# Patient Record
Sex: FEMALE | Race: BLACK OR AFRICAN AMERICAN | Employment: OTHER | URBAN - METROPOLITAN AREA
[De-identification: names, ages, dates, MRNs, and addresses within clinical notes are randomized per-mention and may not be internally consistent; named-entity substitution may affect disease eponyms.]

---

## 2017-01-12 DIAGNOSIS — I10 HYPERTENSION, ESSENTIAL: Primary | ICD-10-CM

## 2017-01-12 NOTE — TELEPHONE ENCOUNTER
Patient came into office concerned about BP readings. BP taken in office 153/78. Dr. Dimple Andres ordering Cardura 4mg  At . Patient called and Ibis Maier will be increased to 4mg and she will take BP twice daily and call Monday and report Bp for evaluation. Cardura 4mg will be called in on Monday if no further changes to meds will be made.

## 2017-01-16 RX ORDER — DOXAZOSIN 4 MG/1
4 TABLET ORAL
Qty: 30 TAB | Refills: 3 | Status: SHIPPED | OUTPATIENT
Start: 2017-01-16 | End: 2017-03-02 | Stop reason: SDUPTHER

## 2017-01-16 NOTE — TELEPHONE ENCOUNTER
Patient called to discuss medications from Dr. Amado Jeronimo. She will continue taking cardura 4mg and keep BP log and call in one week for evaluation. She voices understanding and acceptance of this advice and will call back if any further questions or concerns.

## 2017-01-16 NOTE — TELEPHONE ENCOUNTER
Patient in office today after change in medications. 1/13  145/77  66            131/69  65  1/14  137/75  65  1/15  134/75  68  1/16  129/68  70   In office today  159/72  68.   cardura was increased to 4mg at night.  Please advise

## 2017-01-31 ENCOUNTER — TELEPHONE (OUTPATIENT)
Dept: CARDIOLOGY CLINIC | Age: 77
End: 2017-01-31

## 2017-01-31 NOTE — TELEPHONE ENCOUNTER
Patient calling to report BP readings after cardura change to 4mg every evening. 1/19  121/68  69     124/70  64  1/20  134/76  69     130/74  70  1/21  110/63  Asymptomatic  1/22  123/68  67  1/23  122/69  67     131/73  67  1/24  123/70  64    127/69  67  1/25  132/73  64  1/26  121/66  63  1/27  122/74  72    Ate salty dinner  1/29  169/93  80  1/30  153/76  72  1/31  164/82  76  Mild swelling in left foot  Please advise.

## 2017-02-01 NOTE — TELEPHONE ENCOUNTER
Patient called to discuss Dr. Alice iRvera recommendations. She will watch salt intake and uses spices to add flavor, she will read ingredient labels of purchased food to know salt content. She will continue to check BP and HR daily and report any abnormal findings. She voices understanding and acceptance of this advice and will call back if any further questions or concerns.

## 2017-03-02 DIAGNOSIS — I10 HYPERTENSION, ESSENTIAL: ICD-10-CM

## 2017-03-02 DIAGNOSIS — I10 ESSENTIAL HYPERTENSION: ICD-10-CM

## 2017-03-02 RX ORDER — METOPROLOL SUCCINATE 200 MG/1
200 TABLET, EXTENDED RELEASE ORAL DAILY
Qty: 90 TAB | Refills: 2 | Status: SHIPPED | OUTPATIENT
Start: 2017-03-02

## 2017-03-02 RX ORDER — DOXAZOSIN 4 MG/1
4 TABLET ORAL
Qty: 90 TAB | Refills: 2 | Status: SHIPPED | OUTPATIENT
Start: 2017-03-02

## 2017-03-02 NOTE — TELEPHONE ENCOUNTER
Patient calling to ask for refills to be sent to her mail order pharmacy. She voices understanding and acceptance of this advice and will call back if any further questions or concerns.

## 2017-05-02 ENCOUNTER — OFFICE VISIT (OUTPATIENT)
Dept: ORTHOPEDIC SURGERY | Age: 77
End: 2017-05-02

## 2017-05-02 VITALS
TEMPERATURE: 96.5 F | HEIGHT: 66 IN | BODY MASS INDEX: 37.41 KG/M2 | HEART RATE: 61 BPM | SYSTOLIC BLOOD PRESSURE: 149 MMHG | WEIGHT: 232.8 LBS | DIASTOLIC BLOOD PRESSURE: 69 MMHG

## 2017-05-02 DIAGNOSIS — M25.571 CHRONIC PAIN OF BOTH ANKLES: ICD-10-CM

## 2017-05-02 DIAGNOSIS — G89.29 CHRONIC PAIN OF BOTH ANKLES: ICD-10-CM

## 2017-05-02 DIAGNOSIS — E11.10 TYPE 2 DIABETES MELLITUS WITH KETOACIDOSIS WITHOUT COMA, UNSPECIFIED LONG TERM INSULIN USE STATUS: ICD-10-CM

## 2017-05-02 DIAGNOSIS — R60.0 BILATERAL LOWER EXTREMITY EDEMA: Primary | ICD-10-CM

## 2017-05-02 DIAGNOSIS — M25.572 CHRONIC PAIN OF BOTH ANKLES: ICD-10-CM

## 2017-05-02 NOTE — PROGRESS NOTES
AMBULATORY PROGRESS NOTE      Patient: Ashley Pitts             MRN: [id number]     SSN: [id number] Body mass index is 37.57 kg/(m^2). YOB: 1940     AGE: 64 y.o. EX: female    PCP: Noemi Pinto MD    IMPRESSION/DIAGNOSIS AND TREATMENT PLAN     DIAGNOSES  1. Bilateral lower extremity edema    2. Chronic pain of both ankles        Orders Placed This Encounter    AMB SUPPLY ORDER    [35316] Ankle Min 3V    [87772] Ankle Min 3V    DEXA BONE DENSITY STUDY AXIAL      Ashley Pitts understands her diagnoses and the proposed plan. Plan:    1) Compression stockings with 8-10 mm Hg  2) HEP: Includes walking because of custom brace and shoes with inserts  3) Bone density scan    RTO - after Bone density scan    HPI AND EXAMINATION     [address] A 64 y.o. female who presents to my outpatient office for follow up evaluation of right posterior tibial tendonitis. At last visit (10/20/2016), I ordered the continuation of the right custom hinged AFO brace. The patient presents to the office today wearing her right custom hinged AFO brace. She reports that her legs have swelling and soreness during the day when she walks. Patient inquired if there were compression stockings that she could wear. She also has diabetic shoes and diabetic inserts from an orthotist that comes to her home. Patient also endorses intermittent cramping within both of her legs. She rates her pain 3/10. Patient states that she saw her PCP this morning and her blood pressure medication was not adjusted. She has also been diagnosed with neuropathy by Dr. Katiuska Soto. Ashley Pitts is alert/oriented (name, location, time) and follows commands well. she  is in no acute distress and her affect and mood are appropriate. Left ANKLE and FOOT     Gait: slow and uses assistive device   Tenderness: mild tenderness of the left foot.    Cutaneous: No rashes, skin patches, wounds, or abrasions to the lower legs           Warm and Normal color. No regions of expressible drainage. Medial Border of Tibia Region: mild           Skin color, texture, turgor normal. Normal.  Joint Motion: ROM Ankle: Normal , Hindfoot: (ST,TN,CC) Normal, Forefoot toes: Normal  Neurologic Exam: Neuro: Motor: normal 5/5 strength in all tested muscle groups and Sensory: no sensory deficits noted. Contractures: Gastrocnemius or Achilles Contractures absent  Joint / Tendon Stability: No Ankle or Subtalar instability or joint laxity. No peroneal sublux ability or dislocation  Alignment: Pes Planus Alignment and  Flexible  Vascular: Normal Pulses/ NL Capillary refill, No evidence of DVT seen on physical exam.   No calf swelling, no tenderness to calf muscles. Lymphatic:  No Evidence of Lymphedema. CHART REVIEW     Past Medical History:   Diagnosis Date    Arthritis     Arthropathy, unspecified, site unspecified     Bruising     bruising and bleeding    Diverticulitis     Diverticulosis     Essential hypertension     Fibromyalgia     GERD (gastroesophageal reflux disease)     Hearing loss     Hepatitis     Obesity (BMI 30-39. 9)     Postoperative respiratory failure ([address] 9/30/10    Pre-diabetes     11/16 diet controlled    Renal cell cancer ([address]     Stage T1NxMx    Respiratory failure, post-operative ([address]     Wears glasses      Current Outpatient Prescriptions   Medication Sig    metoprolol succinate (TOPROL-XL) 200 mg XL tablet Take 1 Tab by mouth daily.  doxazosin (CARDURA) 4 mg tablet Take 1 Tab by mouth nightly.  DULoxetine (CYMBALTA) 20 mg capsule Take 1 Cap by mouth daily.  acetaminophen (TYLENOL EXTRA STRENGTH) 500 mg tablet Take  by mouth every six (6) hours as needed for Pain.  cholecalciferol, vitamin D3, (VITAMIN D3) 2,000 unit tab Take  by mouth.  MULTIVIT-MIN/FOLIC ACID/VIT K1 (MULTI FOR HER 50 PLUS PO) Take  by mouth.     fish oil-dha-epa 1,200-144-216 mg cap Take  by mouth.  ACCU-CHEK SOFTCLIX LANCETS misc     ACCU-CHEK TAMY PLUS TEST STRP strip     loratadine (CLARITIN) 10 mg tablet Take 10 mg by mouth.  aluminum hydrox-magnesium carb (GAVISCON)  mg/15 mL suspension Take 15 mL by mouth every six (6) hours as needed for Indigestion.  omega-3 fatty acids-vitamin e (FISH OIL) 1,000 mg Cap Take 1 Cap by mouth.  omeprazole (PRILOSEC) 20 mg capsule Take 40 mg by mouth daily.  amLODIPine (NORVASC) 10 mg tablet Take  by mouth daily.  calcium carbonate (OS-WATSON) 500 mg calcium (1,250 mg) tablet Take 500 mg by mouth daily.  Hydrochlorothiazide 12.5 mg tablet Take 12.5 mg by mouth every other day.  lidocaine-prilocaine (EMLA) topical cream     triamcinolone-emollient cmb#86 0.1 % crea 1 Applicator by Apply Externally route daily as needed. No current facility-administered medications for this visit. Allergies   Allergen Reactions    Aspirin Other (comments)    Codeine Other (comments)     Gi distress      Darvon [Propoxyphene] Rash and Itching     Gi distress    Dextromethorphan Other (comments)     Stomach cramps    Meperidine Hives     Gi distress    Morphine Other (comments)     Neurological reaction    Other Medication Palpitations     Muscle relaxants    Pcn [Penicillins] Swelling    Propoxyphene N-Acetaminophen Hives    Sulfa (Sulfonamide Antibiotics) Hives     Past Surgical History:   Procedure Laterality Date    HX BREAST BIOPSY      HX BREAST REDUCTION      HX CATARACT REMOVAL      HX CHOLECYSTECTOMY      HX HERNIA REPAIR      HX HYSTERECTOMY  1984     total vaginal    HX OTHER SURGICAL  9/2010    SIGMOIDECTOMY    HX VEIN STRIPPING       Social History     Occupational History    Not on file.      Social History Main Topics    Smoking status: Never Smoker    Smokeless tobacco: Never Used    Alcohol use No    Drug use: No    Sexual activity: Not on file     Family History   Problem Relation Age of Onset    Heart Attack Mother 79    Diabetes Other     Hypertension Other     Arthritis-osteo Other     Heart Disease Other     Heart Attack Sister 71       REVIEW OF SYSTEMS : 2017  ALL BELOW ARE Negative except : SEE HPI       Constitutional: Negative for fever, chills and weight loss. Neg Weigh Loss  Cardiovascular: Negative for chest pain, claudication and leg swelling. SOB, BAIG   Gastrointestinal: Negative for  pain, N/V/D/C, Blood in stool or urine,dysuria, hematuria,        Incontinence, pelvic pain  Musculoskeletal: see HPI. Neurological: Negative for dizziness and weakness. Negative for headaches,Visual Changes, Confusion, Seizures,   Psychiatric/Behavioral: Negative for depression, memory loss and substance abuse. Extremities:  Negative for  hair changes, rash or skin lesion changes. Hematologic: Negative for Bleeding problems, bruising, pallor or swollen lymph nodes. Peripheral Vascular: No calf pain, vascular vein tenderness to calf pain              No calf throbbing, posterior knee throbbing pain    DIAGNOSTIC IMAGING     No notes on file    Written by Bailey Caro, as dictated by Hollie Duran MD. Dr. ESTEFANI, Hollie Duran MD, confirm that all documentation is accurate.

## 2017-05-02 NOTE — PATIENT INSTRUCTIONS
Please follow up after Bone density scan. You are advised to contact us if your condition worsens. You have been provided with an order for durable medical equipment that you may  at an outside facility as our office does not carry the equipment you need. You may pick it up at any medical supply company you like. Listed below are a few different locations for your convenience:    [address] [place] [address]  [address], [address]  Phone: [phone]     Leg and Ankle Edema: Care Instructions  Your Care Instructions  Swelling in the legs, ankles, and feet is called edema. It is common after you sit or stand for a while. Long plane flights or car rides often cause swelling in the legs and feet. You may also have swelling if you have to stand for long periods of time at your job. Problems with the veins in the legs (varicose veins) and changes in hormones can also cause swelling. Sometimes the swelling in the ankles and feet is caused by a more serious problem, such as heart failure, infection, blood clots, or liver or kidney disease. Follow-up care is a key part of your treatment and safety. Be sure to make and go to all appointments, and call your doctor if you are having problems. Its also a good idea to know your test results and keep a list of the medicines you take. How can you care for yourself at home? · If your doctor gave you medicine, take it as prescribed. Call your doctor if you think you are having a problem with your medicine. · Whenever you are resting, raise your legs up. Try to keep the swollen area higher than the level of your heart. · Take breaks from standing or sitting in one position. ¨ Walk around to increase the blood flow in your lower legs. ¨ Move your feet and ankles often while you stand, or tighten and relax your leg muscles. · Wear support stockings. Put them on in the morning, before swelling gets worse. · Eat a balanced diet.  Lose weight if you need to.  · Limit the amount of salt (sodium) in your diet. Salt holds fluid in the body and may increase swelling. When should you call for help? Call 911 anytime you think you may need emergency care. For example, call if:  · You have symptoms of a blood clot in your lung (called a pulmonary embolism). These may include:  ¨ Sudden chest pain. ¨ Trouble breathing. ¨ Coughing up blood. Call your doctor now or seek immediate medical care if:  · You have signs of a blood clot, such as:  ¨ Pain in your calf, back of the knee, thigh, or groin. ¨ Redness and swelling in your leg or groin. · You have symptoms of infection, such as:  ¨ Increased pain, swelling, warmth, or redness. ¨ Red streaks or pus. ¨ A fever. Watch closely for changes in your health, and be sure to contact your doctor if:  · Your swelling is getting worse. · You have new or worsening pain in your legs. · You do not get better as expected. Where can you learn more? Go to http://angie-dayanara.info/. Enter V272 in the search box to learn more about \"Leg and Ankle Edema: Care Instructions. \"  Current as of: May 27, 2016  Content Version: 11.2  © 8699-5809 [employer], [employer]. Care instructions adapted under license by [employer] (which disclaims liability or warranty for this information). If you have questions about a medical condition or this instruction, always ask your healthcare professional. [place] [address] any warranty or liability for your use of this information.

## 2017-05-02 NOTE — MR AVS SNAPSHOT
Visit Information Date & Time Provider Department Dept. Phone Encounter #  
 5/2/2017  1:10 PM Smiley Wilhelm MD [facility] [phone] [id number] Your Appointments 6/12/2017 11:00 AM  
[facility]OUND with [facility] Appt Note: [facility]-MDF  
 [address] [place] [address] [id number]  
  
    
 6/12/2017 11:30 AM  
ESTABLISHED PATIENT with Zelda Rock MD  
[facility]) Appt Note: RCC-rev SHIRIN  
 [address], [address]  
  
   
 [address], [address] [facility] [place] [address] Upcoming Health Maintenance Date Due DTaP/Tdap/Td series (1 - Tdap) 9/10/1959 ZOSTER VACCINE AGE 60> 9/10/1998 GLAUCOMA SCREENING Q2Y 9/10/2003 OSTEOPOROSIS SCREENING (DEXA) 9/10/2003 Pneumococcal 65+ High/Highest Risk (1 of 2 - PCV13) 9/10/2003 MEDICARE YEARLY EXAM 9/10/2003 INFLUENZA AGE 9 TO ADULT 8/1/2017 Allergies as of 5/2/2017  Review Complete On: 5/2/2017 By: Mary Lou Gautam Severity Noted Reaction Type Reactions Aspirin  03/26/2012    Other (comments) Codeine  03/26/2012    Other (comments) Gi distress Darvon [Propoxyphene]  03/26/2012    Rash, Itching Gi distress Dextromethorphan  04/24/2013    Other (comments) Stomach cramps Meperidine  03/26/2012    Hives Gi distress Morphine  03/26/2012    Other (comments) Neurological reaction Other Medication  10/24/2016    Palpitations Muscle relaxants Pcn [Penicillins]  03/26/2012    Swelling Propoxyphene N-acetaminophen  03/26/2012    Hives Sulfa (Sulfonamide Antibiotics)  03/26/2012    Hives Current Immunizations  Never Reviewed No immunizations on file. Not reviewed this visit You Were Diagnosed With   
  
 Codes Comments Bilateral lower extremity edema    -  Primary ICD-10-CM: R60.0 ICD-9-CM: 757. 3 Chronic pain of both ankles     ICD-10-CM: M25.571, G89.29, M25.572 ICD-9-CM: 719.47, 338.29 Vitals BP Pulse Temp Height(growth percentile) Weight(growth percentile) BMI  
 149/69 61 96.5 °F (35.8 °C) (Oral) 5' 6\" (1.676 m) 232 lb 12.8 oz (105.6 kg) 37.57 kg/m2 OB Status Smoking Status Hysterectomy Never Smoker BMI and BSA Data Body Mass Index Body Surface Area  
 37.57 kg/m 2 2.22 m 2 Preferred Pharmacy Pharmacy Name Phone [facility] [address] [place] [address] [address] [facility] [address] [phone] Your Updated Medication List  
  
   
This list is accurate as of: 5/2/17  1:33 PM.  Always use your most recent med list.  
  
  
  
  
 ACCU-CHEK TAMY PLUS TEST STRP strip Generic drug:  glucose blood VI test strips Kristyn Bliss Generic drug:  Lancets  
  
 aluminum hydrox-magnesium carb  mg/15 mL suspension Commonly known as:  GAVISCON Take 15 mL by mouth every six (6) hours as needed for Indigestion. calcium carbonate 500 mg calcium (1,250 mg) tablet Commonly known as:  OS-WATSON Take 500 mg by mouth daily. doxazosin 4 mg tablet Commonly known as:  CARDURA Take 1 Tab by mouth nightly. DULoxetine 20 mg capsule Commonly known as:  CYMBALTA Take 1 Cap by mouth daily. FISH OIL 1,000 mg Cap Generic drug:  omega-3 fatty acids-vitamin e Take 1 Cap by mouth. fish oil-dha-epa 1,200-144-216 mg Cap Take  by mouth. hydroCHLOROthiazide 12.5 mg tablet Commonly known as:  HYDRODIURIL Take 12.5 mg by mouth every other day. lidocaine-prilocaine topical cream  
Commonly known as:  EMLA  
  
 loratadine 10 mg tablet Commonly known as:  Al Alvine Take 10 mg by mouth.  
  
 metoprolol succinate 200 mg XL tablet Commonly known as:  TOPROL-XL Take 1 Tab by mouth daily. MULTI FOR HER 50 PLUS PO Take  by mouth. NORVASC 10 mg tablet Generic drug:  amLODIPine Take  by mouth daily. PriLOSEC 20 mg capsule Generic drug:  omeprazole Take 40 mg by mouth daily. triamcinolone-emollient cmb#86 0.1 % Crea 1 Applicator by Apply Externally route daily as needed. TYLENOL EXTRA STRENGTH 500 mg tablet Generic drug:  acetaminophen Take  by mouth every six (6) hours as needed for Pain. VITAMIN D3 2,000 unit Tab Generic drug:  cholecalciferol (vitamin D3) Take  by mouth. We Performed the Following AMB POC XRAY, ANKLE; COMPLETE, 3+ VIE [02183 CPT(R)] AMB POC XRAY, ANKLE; COMPLETE, 3+ VIE [43795 CPT(R)] AMB SUPPLY ORDER [[id number] Custom] Comments:  
 Bialteral lower extremity compression stocking 8-10 mmHg To-Do List   
 05/02/2017 Imaging:  DEXA BONE DENSITY STUDY AXIAL Patient Instructions Please follow up after Bone density scan. You are advised to contact us if your condition worsens. You have been provided with an order for durable medical equipment that you may  at an outside facility as our office does not carry the equipment you need. You may pick it up at any medical supply company you like. Listed below are a few different locations for your convenience: [facility] [address] [place] [address] Street Phone: [phone] Leg and Ankle Edema: Care Instructions Your Care Instructions Swelling in the legs, ankles, and feet is called edema. It is common after you sit or stand for a while. Long plane flights or car rides often cause swelling in the legs and feet. You may also have swelling if you have to stand for long periods of time at your job. Problems with the veins in the legs (varicose veins) and changes in hormones can also cause swelling. Sometimes the swelling in the ankles and feet is caused by a more serious problem, such as heart failure, infection, blood clots, or liver or kidney disease. Follow-up care is a key part of your treatment and safety. Be sure to make and go to all appointments, and call your doctor if you are having problems. Its also a good idea to know your test results and keep a list of the medicines you take. How can you care for yourself at home? · If your doctor gave you medicine, take it as prescribed. Call your doctor if you think you are having a problem with your medicine. · Whenever you are resting, raise your legs up. Try to keep the swollen area higher than the level of your heart. · Take breaks from standing or sitting in one position. ¨ Walk around to increase the blood flow in your lower legs. ¨ Move your feet and ankles often while you stand, or tighten and relax your leg muscles. · Wear support stockings. Put them on in the morning, before swelling gets worse. · Eat a balanced diet. Lose weight if you need to. · Limit the amount of salt (sodium) in your diet. Salt holds fluid in the body and may increase swelling. When should you call for help? Call 911 anytime you think you may need emergency care. For example, call if: 
· You have symptoms of a blood clot in your lung (called a pulmonary embolism). These may include: 
¨ Sudden chest pain. ¨ Trouble breathing. ¨ Coughing up blood. Call your doctor now or seek immediate medical care if: 
· You have signs of a blood clot, such as: 
¨ Pain in your calf, back of the knee, thigh, or groin. ¨ Redness and swelling in your leg or groin. · You have symptoms of infection, such as: 
¨ Increased pain, swelling, warmth, or redness. ¨ Red streaks or pus. ¨ A fever. Watch closely for changes in your health, and be sure to contact your doctor if: 
· Your swelling is getting worse. · You have new or worsening pain in your legs. · You do not get better as expected. Where can you learn more? Go to http://angie-dayanara.info/. Enter E618 in the search box to learn more about \"Leg and Ankle Edema: Care Instructions. \" Current as of: May 27, 2016 Content Version: 11.2 © 6890-9246 Lernstift. Care instructions adapted under license by geolad (which disclaims liability or warranty for this information). If you have questions about a medical condition or this instruction, always ask your healthcare professional. [place] [address] any warranty or liability for your use of this information. Introducing [facility]! [address] introduces [employer] patient portal. Now you can access parts of your medical record, email your doctor's office, and request medication refills online. 1. In your internet browser, go to [email or web address] 2. Click on the First Time User? Click Here link in the Sign In box. You will see the New Member Sign Up page. 3. Enter your Wongnai Access Code exactly as it appears below. You will not need to use this code after youve completed the sign-up process. If you do not sign up before the expiration date, you must request a new code. · Wongnai Access Code: [email or web address] Expires: 7/31/2017  1:33 PM 
 
4. Enter the last four digits of your Social Security Number (xxxx) and Date of Birth (mm/dd/yyyy) as indicated and click Submit. You will be taken to the next sign-up page. 5. Create a Crediit ID. This will be your Wongnai login ID and cannot be changed, so think of one that is secure and easy to remember. 6. Create a Wongnai password. You can change your password at any time. 7. Enter your Password Reset Question and Answer. This can be used at a later time if you forget your password. 8. Enter your e-mail address. You will receive e-mail notification when new information is available in [address] 9. Click Sign Up. You can now view and download portions of your medical record. 10. Click the Download Summary menu link to download a portable copy of your medical information. If you have questions, please visit the Frequently Asked Questions section of the Age of Learning website. Remember, Age of Learning is NOT to be used for urgent needs. For medical emergencies, dial 911. Now available from your iPhone and Android! Please provide this summary of care documentation to your next provider. Your primary care clinician is listed as Telma Lemus. If you have any questions after today's visit, please call [phone].

## 2017-05-16 ENCOUNTER — HOSPITAL ENCOUNTER (OUTPATIENT)
Dept: BONE DENSITY | Age: 77
Discharge: HOME | End: 2017-05-16
Attending: ORTHOPAEDIC SURGERY
Payer: MEDICARE

## 2017-05-16 DIAGNOSIS — M25.572 CHRONIC PAIN OF BOTH ANKLES: ICD-10-CM

## 2017-05-16 DIAGNOSIS — G89.29 CHRONIC PAIN OF BOTH ANKLES: ICD-10-CM

## 2017-05-16 DIAGNOSIS — R60.0 BILATERAL LOWER EXTREMITY EDEMA: ICD-10-CM

## 2017-05-16 DIAGNOSIS — M25.571 CHRONIC PAIN OF BOTH ANKLES: ICD-10-CM

## 2017-05-16 PROCEDURE — 77080 DXA BONE DENSITY AXIAL: CPT

## 2017-07-13 ENCOUNTER — TELEPHONE (OUTPATIENT)
Dept: ORTHOPEDIC SURGERY | Age: 77
End: 2017-07-13

## 2017-07-13 NOTE — TELEPHONE ENCOUNTER
Put in order for DM shoes, spoke with pt. She will be coming in to  the order at the [facility] office.

## 2017-07-13 NOTE — TELEPHONE ENCOUNTER
PATIENT CALLED AND IS REQUESTING A NEW ORDER FOR DIABETIC SHOES FOR ANASTASIYA FOOT. PATIENT SAID DR. YOON HAS GIVEN HER ORDERS FOR SHOES BEFORE.     WOULD LIKE TO  THE ORDER FROM THE [facility] LOCATION. PATIENT TEL. [phone].

## 2017-12-13 ENCOUNTER — OFFICE VISIT (OUTPATIENT)
Dept: ORTHOPEDIC SURGERY | Age: 77
End: 2017-12-13

## 2017-12-13 VITALS
HEART RATE: 64 BPM | HEIGHT: 66 IN | SYSTOLIC BLOOD PRESSURE: 145 MMHG | BODY MASS INDEX: 36.96 KG/M2 | DIASTOLIC BLOOD PRESSURE: 68 MMHG | WEIGHT: 230 LBS

## 2017-12-13 DIAGNOSIS — M76.821 POSTERIOR TIBIAL TENDINITIS, RIGHT: Primary | ICD-10-CM

## 2017-12-13 DIAGNOSIS — G89.29 CHRONIC PAIN OF RIGHT ANKLE: ICD-10-CM

## 2017-12-13 DIAGNOSIS — M25.571 CHRONIC PAIN OF RIGHT ANKLE: ICD-10-CM

## 2017-12-13 NOTE — PROCEDURES
DIAGNOSTIC STUDIES:  X-rays, three views of the right ankle and right foot, five images in totality. The ankle joint is well maintained. There are no osteolytic or osteoblastic lesions seen. There is planovalgus alignment of the right foot with some moderate soft tissue swelling seen medially and laterally, as well as some calcaneofibular impingement on these ankle x-rays. Otherwise, I see no osteolytic or osteoblastic lesions. No fracture, subluxation, or dislocation.

## 2017-12-13 NOTE — PROGRESS NOTES
AMBULATORY PROGRESS NOTE      Patient: Michelle Martin             MRN: [id number]     SSN: [id number] Body mass index is 37.12 kg/(m^2). YOB: 1940     AGE: 76 y.o. EX: female    PCP: Netta Curran MD    IMPRESSION/DIAGNOSIS AND TREATMENT PLAN     DIAGNOSES  1. Posterior tibial tendinitis, right    2. Chronic pain of right ankle        Orders Placed This Encounter    AMB SUPPLY ORDER    POC XRAY, ANKLE; 2 VIEWS    POC XRAY, FOOT; [facility] [place] understands her diagnoses and the proposed plan. Plan:    1) Consider obtaining supportive shoes to wear with the brace at home. 2) DME Order: Evaluation of the AFO brace, consider an extended SMO brace. RTO - 5 weeks    HPI AND EXAMINATION     Michelle Martin IS A 76 y.o. female who presents to my outpatient office for follow up evaluation of right posterior tibial tendonitis. At last visit, I recommended using a compression stocking, a HEP, and ordered a bone density scan. We have seen her in the past for tendinitis and we gave her a right AFO brace that she has continued using. At this time, she complains of BLE swelling that is continuous throughout the day. She reports having a heart work up last December that came back normal. She reports that her right foot has turned to the right and has changed her gait. She has been using the AFO brace for over a year and notes that it helps to prevent swelling and correct a bit of her alignment. Michelle Martin is alert/oriented (name, location, time) and follows commands well. she  is in no acute distress and her affect and mood are appropriate.       Left ANKLE and FOOT     Gait: slow and uses assistive device   Tenderness: mild tenderness of the left foot. Cutaneous: No rashes, skin patches, wounds, or abrasions to the lower legs                                Warm and Normal color. No regions of expressible drainage. Medial Border of Tibia Region: mild                                Skin color, texture, turgor normal. Normal.  Joint Motion: ROM Ankle: Normal , Hindfoot: (ST,TN,CC) Normal, Forefoot toes: Normal  Neurologic Exam: Neuro: Motor: normal 5/5 strength in all tested muscle groups and Sensory: no sensory deficits noted. Contractures: Gastrocnemius or Achilles Contractures absent  Joint / Tendon Stability: No Ankle or Subtalar instability or joint laxity. No peroneal sublux ability or dislocation  Alignment: Pes Planus Alignment and  Flexible  Vascular: Normal Pulses/ NL Capillary refill, No evidence of DVT seen on physical exam.                        No calf swelling, no tenderness to calf muscles. Lymphatic:  No Evidence of Lymphedema. CHART REVIEW     Past Medical History:   Diagnosis Date    Arthritis     Arthropathy, unspecified, site unspecified     Bruising     bruising and bleeding    Diverticulitis     Diverticulosis     Essential hypertension     Fibromyalgia     GERD (gastroesophageal reflux disease)     Hearing loss     Hepatitis     Obesity (BMI 30-39. 9)     Postoperative respiratory failure ([address] 9/30/10    Pre-diabetes     11/16 diet controlled    Renal cell cancer ([facility]     Stage T1NxMx    Respiratory failure, post-operative ([address]     Wears glasses      Current Outpatient Prescriptions   Medication Sig    metoprolol succinate (TOPROL-XL) 200 mg XL tablet Take 1 Tab by mouth daily.  doxazosin (CARDURA) 4 mg tablet Take 1 Tab by mouth nightly.  DULoxetine (CYMBALTA) 20 mg capsule Take 1 Cap by mouth daily.  acetaminophen (TYLENOL EXTRA STRENGTH) 500 mg tablet Take  by mouth every six (6) hours as needed for Pain.  cholecalciferol, vitamin D3, (VITAMIN D3) 2,000 unit tab Take  by mouth.  MULTIVIT-MIN/FOLIC ACID/VIT K1 (MULTI FOR HER 50 PLUS PO) Take  by mouth.     fish oil-dha-epa 1,200-144-216 mg cap Take by mouth.  ACCU-CHEK SOFTCLIX LANCETS [facility]     ACCU-CHEK TAMY PLUS TEST STRP strip     loratadine (CLARITIN) 10 mg tablet Take 10 mg by mouth.  aluminum hydrox-magnesium carb (GAVISCON)  mg/15 mL suspension Take 15 mL by mouth every six (6) hours as needed for Indigestion.  triamcinolone-emollient cmb#86 0.1 % crea 1 Applicator by Apply Externally route daily as needed.  omega-3 fatty acids-vitamin e (FISH OIL) 1,000 mg Cap Take 1 Cap by mouth.  omeprazole (PRILOSEC) 20 mg capsule Take 40 mg by mouth two (2) times a day.  amLODIPine (NORVASC) 10 mg tablet Take  by mouth daily.  calcium carbonate (OS-WATSON) 500 mg calcium (1,250 mg) tablet Take 500 mg by mouth daily.  Hydrochlorothiazide 12.5 mg tablet Take 12.5 mg by mouth every other day.  lidocaine-prilocaine (EMLA) topical cream      No current facility-administered medications for this visit. Allergies   Allergen Reactions    Aspirin Other (comments)    Codeine Other (comments)     Gi distress      Darvon [Propoxyphene] Rash and Itching     Gi distress    Dextromethorphan Other (comments)     Stomach cramps    Meperidine Hives     Gi distress    Morphine Other (comments)     Neurological reaction    Other Medication Palpitations     Muscle relaxants    Pcn [Penicillins] Swelling    Propoxyphene N-Acetaminophen Hives    Sulfa (Sulfonamide Antibiotics) Hives     Past Surgical History:   Procedure Laterality Date    HX BREAST BIOPSY      HX BREAST REDUCTION      HX CATARACT REMOVAL      HX CHOLECYSTECTOMY      HX HERNIA REPAIR      HX HYSTERECTOMY  1984     total vaginal    HX OTHER SURGICAL  9/2010    SIGMOIDECTOMY    HX VEIN STRIPPING       Social History     Occupational History    Not on file.      Social History Main Topics    Smoking status: Never Smoker    Smokeless tobacco: Never Used    Alcohol use No    Drug use: No    Sexual activity: Not on file     Family History   Problem Relation Age of Onset    Heart Attack Mother 79    Diabetes Other     Hypertension Other     Arthritis-osteo Other     Heart Disease Other     Heart Attack Sister 71       REVIEW OF SYSTEMS : 12/13/2017  ALL BELOW ARE Negative except : SEE HPI       Constitutional: Negative for fever, chills and weight loss. Neg Weigh Loss  Cardiovascular: Negative for chest pain, claudication and leg swelling. SOB, BAIG   Gastrointestinal: Negative for  pain, N/V/D/C, Blood in stool or urine,dysuria, hematuria,        Incontinence, pelvic pain  Musculoskeletal: see HPI. Neurological: Negative for dizziness and weakness. Negative for headaches,Visual Changes, Confusion, Seizures,   Psychiatric/Behavioral: Negative for depression, memory loss and substance abuse. Extremities:  Negative for  hair changes, rash or skin lesion changes. Hematologic: Negative for Bleeding problems, bruising, pallor or swollen lymph nodes. Peripheral Vascular: No calf pain, vascular vein tenderness to calf pain              No calf throbbing, posterior knee throbbing pain    DIAGNOSTIC IMAGING      Dictation on: 12/13/2017 12:26 PM by: Maite Salazar [[id number]]         Written by Paras Hong, as dictated by Lois Aguilar MD. Dr. ESTEFANI, Lois Aguilar MD, confirm that all documentation is accurate.

## 2017-12-13 NOTE — PATIENT INSTRUCTIONS
Please follow up in 5 weeks. You are advised to contact us if your condition worsens. The provider has ordered a custom brace/orthotic for you. This will be customized and made for you by an outside facility. Please contact the orthotist at one of the below offices for your custom fitting and follow up in the office with the doctor or his physicians assistant 3 weeks after you have been wearing the brace. Cayetano Landry at [facility]:     [address] [facility] Leanne Marquez   Phone: [phone] Jose Rafaeltheodorelisa Thom. [facility]  Phone: [phone]     Leg and Ankle Edema: Care Instructions  Your Care Instructions  Swelling in the legs, ankles, and feet is called edema. It is common after you sit or stand for a while. Long plane flights or car rides often cause swelling in the legs and feet. You may also have swelling if you have to stand for long periods of time at your job. Problems with the veins in the legs (varicose veins) and changes in hormones can also cause swelling. Sometimes the swelling in the ankles and feet is caused by a more serious problem, such as heart failure, infection, blood clots, or liver or kidney disease. Follow-up care is a key part of your treatment and safety. Be sure to make and go to all appointments, and call your doctor if you are having problems. It's also a good idea to know your test results and keep a list of the medicines you take. How can you care for yourself at home? · If your doctor gave you medicine, take it as prescribed. Call your doctor if you think you are having a problem with your medicine. · Whenever you are resting, raise your legs up. Try to keep the swollen area higher than the level of your heart. · Take breaks from standing or sitting in one position. ¨ Walk around to increase the blood flow in your lower legs.   ¨ Move your feet and ankles often while you stand, or tighten and relax your leg muscles. · Wear support stockings. Put them on in the morning, before swelling gets worse. · Eat a balanced diet. Lose weight if you need to. · Limit the amount of salt (sodium) in your diet. Salt holds fluid in the body and may increase swelling. When should you call for help? Call 911 anytime you think you may need emergency care. For example, call if:  ? · You have symptoms of a blood clot in your lung (called a pulmonary embolism). These may include:  ¨ Sudden chest pain. ¨ Trouble breathing. ¨ Coughing up blood. ?Call your doctor now or seek immediate medical care if:  ? · You have signs of a blood clot, such as:  ¨ Pain in your calf, back of the knee, thigh, or groin. ¨ Redness and swelling in your leg or groin. ? · You have symptoms of infection, such as:  ¨ Increased pain, swelling, warmth, or redness. ¨ Red streaks or pus. ¨ A fever. ? Watch closely for changes in your health, and be sure to contact your doctor if:  ? · Your swelling is getting worse. ? · You have new or worsening pain in your legs. ? · You do not get better as expected. Where can you learn more? Go to http://angie-dayanara.info/. Enter R494 in the search box to learn more about \"Leg and Ankle Edema: Care Instructions. \"  Current as of: March 20, 2017  Content Version: 11.4  © 4333-5134 [employer]. Care instructions adapted under license by [employer] (which disclaims liability or warranty for this information). If you have questions about a medical condition or this instruction, always ask your healthcare professional. [place] [address] any warranty or liability for your use of this information.

## 2017-12-13 NOTE — MR AVS SNAPSHOT
Visit Information Date & Time Provider Department Dept. Phone Encounter #  
 12/13/2017 10:50 AM Murali Feldman MD [facility] [id number] Upcoming Health Maintenance Date Due DTaP/Tdap/Td series (1 - Tdap) 9/10/1959 ZOSTER VACCINE AGE 60> 7/10/1998 GLAUCOMA SCREENING Q2Y 9/10/2003 Pneumococcal 65+ High/Highest Risk (1 of 2 - PCV13) 9/10/2003 MEDICARE YEARLY EXAM 9/10/2003 Influenza Age 5 to Adult 8/1/2017 Allergies as of 12/13/2017  Review Complete On: 12/13/2017 By: Harman Cronin Severity Noted Reaction Type Reactions Aspirin  03/26/2012    Other (comments) Codeine  03/26/2012    Other (comments) Gi distress Darvon [Propoxyphene]  03/26/2012    Rash, Itching Gi distress Dextromethorphan  04/24/2013    Other (comments) Stomach cramps Meperidine  03/26/2012    Hives Gi distress Morphine  03/26/2012    Other (comments) Neurological reaction Other Medication  10/24/2016    Palpitations Muscle relaxants Pcn [Penicillins]  03/26/2012    Swelling Propoxyphene N-acetaminophen  03/26/2012    Hives Sulfa (Sulfonamide Antibiotics)  03/26/2012    Hives Current Immunizations  Never Reviewed No immunizations on file. Not reviewed this visit You Were Diagnosed With   
  
 Codes Comments Chronic pain of right ankle    -  Primary ICD-10-CM: M25.571, G89.29 ICD-9-CM: 719.47, 338.29 Vitals BP Pulse Height(growth percentile) Weight(growth percentile) BMI OB Status 145/68 64 5' 6\" (1.676 m) 230 lb (104.3 kg) 37.12 kg/m2 Hysterectomy Smoking Status Never Smoker Vitals History BMI and BSA Data Body Mass Index Body Surface Area  
 37.12 kg/m 2 2.2 m 2 Preferred Pharmacy Pharmacy Name Phone [place] [id number] [facility] [address] [phone] Your Updated Medication List  
  
   
This list is accurate as of: 12/13/17 12:21 PM.  Always use your most recent med list.  
  
  
  
  
 ACCU-CHEK TAMY PLUS TEST STRP strip Generic drug:  glucose blood VI test strips [address] Generic drug:  Lancets  
  
 aluminum hydrox-magnesium carb  mg/15 mL suspension Commonly known as:  GAVISCON Take 15 mL by mouth every six (6) hours as needed for Indigestion. calcium carbonate 500 mg calcium (1,250 mg) tablet Commonly known as:  OS-WATSON Take 500 mg by mouth daily. doxazosin 4 mg tablet Commonly known as:  CARDURA Take 1 Tab by mouth nightly. DULoxetine 20 mg capsule Commonly known as:  CYMBALTA Take 1 Cap by mouth daily. FISH OIL 1,000 mg Cap Generic drug:  omega-3 fatty acids-vitamin e Take 1 Cap by mouth. fish oil-dha-epa 1,200-144-216 mg Cap Take  by mouth. hydroCHLOROthiazide 12.5 mg tablet Commonly known as:  HYDRODIURIL Take 12.5 mg by mouth every other day. lidocaine-prilocaine topical cream  
Commonly known as:  EMLA  
  
 loratadine 10 mg tablet Commonly known as:  Vickki Tony Take 10 mg by mouth.  
  
 metoprolol succinate 200 mg XL tablet Commonly known as:  TOPROL-XL Take 1 Tab by mouth daily. MULTI FOR HER 50 PLUS PO Take  by mouth. NORVASC 10 mg tablet Generic drug:  amLODIPine Take  by mouth daily. PriLOSEC 20 mg capsule Generic drug:  omeprazole Take 40 mg by mouth two (2) times a day. triamcinolone-emollient comb86 0.1 % Crea 1 Applicator by Apply Externally route daily as needed. TYLENOL EXTRA STRENGTH 500 mg tablet Generic drug:  acetaminophen Take  by mouth every six (6) hours as needed for Pain. VITAMIN D3 2,000 unit Tab Generic drug:  cholecalciferol (vitamin D3) Take  by mouth. We Performed the Following POC XRAY, ANKLE; 2 VIEWS [80461 CPT(R)] POC XRAY, FOOT; 2 VIEWS [19773 CPT(R)] Patient Instructions Please follow up in 5 weeks. You are advised to contact us if your condition worsens. The provider has ordered a custom brace/orthotic for you. This will be customized and made for you by an outside facility. Please contact the orthotist at one of the below offices for your custom fitting and follow up in the office with the doctor or his physicians assistant 3 weeks after you have been wearing the brace. hospitals at [facility]:  
 
[address] Leanne Jeffries [address] Phone: [phone] Or  
 
[address] [facility] Phone: [phone] Leg and Ankle Edema: Care Instructions Your Care Instructions Swelling in the legs, ankles, and feet is called edema. It is common after you sit or stand for a while. Long plane flights or car rides often cause swelling in the legs and feet. You may also have swelling if you have to stand for long periods of time at your job. Problems with the veins in the legs (varicose veins) and changes in hormones can also cause swelling. Sometimes the swelling in the ankles and feet is caused by a more serious problem, such as heart failure, infection, blood clots, or liver or kidney disease. Follow-up care is a key part of your treatment and safety. Be sure to make and go to all appointments, and call your doctor if you are having problems. It's also a good idea to know your test results and keep a list of the medicines you take. How can you care for yourself at home? · If your doctor gave you medicine, take it as prescribed. Call your doctor if you think you are having a problem with your medicine. · Whenever you are resting, raise your legs up. Try to keep the swollen area higher than the level of your heart. · Take breaks from standing or sitting in one position. ¨ Walk around to increase the blood flow in your lower legs. ¨ Move your feet and ankles often while you stand, or tighten and relax your leg muscles. · Wear support stockings. Put them on in the morning, before swelling gets worse. · Eat a balanced diet. Lose weight if you need to. · Limit the amount of salt (sodium) in your diet. Salt holds fluid in the body and may increase swelling. When should you call for help? Call 911 anytime you think you may need emergency care. For example, call if: 
? · You have symptoms of a blood clot in your lung (called a pulmonary embolism). These may include: 
¨ Sudden chest pain. ¨ Trouble breathing. ¨ Coughing up blood. ?Call your doctor now or seek immediate medical care if: 
? · You have signs of a blood clot, such as: 
¨ Pain in your calf, back of the knee, thigh, or groin. ¨ Redness and swelling in your leg or groin. ? · You have symptoms of infection, such as: 
¨ Increased pain, swelling, warmth, or redness. ¨ Red streaks or pus. ¨ A fever. ? Watch closely for changes in your health, and be sure to contact your doctor if: 
? · Your swelling is getting worse. ? · You have new or worsening pain in your legs. ? · You do not get better as expected. Where can you learn more? Go to http://angie-dayanara.info/. Enter J191 in the search box to learn more about \"Leg and Ankle Edema: Care Instructions. \" Current as of: March 20, 2017 Content Version: 11.4 © 5417-1784 [employer]. Care instructions adapted under license by [employer] (which disclaims liability or warranty for this information). If you have questions about a medical condition or this instruction, always ask your healthcare professional. [place] [address] any warranty or liability for your use of this information. Introducing [facility]!    
 Yanni Meraz introduces [facility] patient portal. Now you can access parts of your medical record, email your doctor's office, and request medication refills online. 1. In your internet browser, go to [email or web address] 2. Click on the First Time User? Click Here link in the Sign In box. You will see the New Member Sign Up page. 3. Enter your [employer] Access Code exactly as it appears below. You will not need to use this code after youve completed the sign-up process. If you do not sign up before the expiration date, you must request a new code. · [employer] Access Code: [email or web address] Expires: 3/13/2018 12:21 PM 
 
4. Enter the last four digits of your Social Security Number (xxxx) and Date of Birth (mm/dd/yyyy) as indicated and click Submit. You will be taken to the next sign-up page. 5. Create a [employer] ID. This will be your [employer] login ID and cannot be changed, so think of one that is secure and easy to remember. 6. Create a [employer] password. You can change your password at any time. 7. Enter your Password Reset Question and Answer. This can be used at a later time if you forget your password. 8. Enter your e-mail address. You will receive e-mail notification when new information is available in [address] 9. Click Sign Up. You can now view and download portions of your medical record. 10. Click the Download Summary menu link to download a portable copy of your medical information. If you have questions, please visit the Frequently Asked Questions section of the [employer] website. Remember, [employer] is NOT to be used for urgent needs. For medical emergencies, dial 911. Now available from your iPhone and Android! Please provide this summary of care documentation to your next provider. Your primary care clinician is listed as Luciano Amin. If you have any questions after today's visit, please call [phone].

## 2017-12-19 ENCOUNTER — TELEPHONE (OUTPATIENT)
Dept: ORTHOPEDIC SURGERY | Age: 77
End: 2017-12-19

## 2017-12-19 NOTE — TELEPHONE ENCOUNTER
Patient called requesting a call back from [facility] regarding the order for her brace. Please advise patient at [phone].

## 2017-12-21 NOTE — TELEPHONE ENCOUNTER
Patient will need to be seen in the office for her left ankle.     Manuel Salamanca PA-C  12/21/2017   5:53 PM

## 2017-12-21 NOTE — TELEPHONE ENCOUNTER
Spoke with patient, she has been given an order for her right ankle. She is asking if she needs a brace for the left ankle as well.   She states that the \"string holding the left ankle up, is starting to collapse\"  She is wondering if she can get a brace for the left as well or should she be seen first.

## 2017-12-22 NOTE — TELEPHONE ENCOUNTER
Spoke with patient, she was informed that she will need to be seen for her left ankle prior to any orders for a brace. Patient understood and transferred to the [facility].

## 2018-01-09 ENCOUNTER — OFFICE VISIT (OUTPATIENT)
Dept: ORTHOPEDIC SURGERY | Age: 78
End: 2018-01-09

## 2018-01-09 VITALS
WEIGHT: 233 LBS | HEART RATE: 74 BPM | DIASTOLIC BLOOD PRESSURE: 72 MMHG | SYSTOLIC BLOOD PRESSURE: 145 MMHG | BODY MASS INDEX: 37.45 KG/M2 | HEIGHT: 66 IN | OXYGEN SATURATION: 96 % | RESPIRATION RATE: 16 BRPM

## 2018-01-09 DIAGNOSIS — I83.892 VARICOSE VEINS OF LEFT LEG WITH EDEMA: ICD-10-CM

## 2018-01-09 DIAGNOSIS — M79.672 LEFT FOOT PAIN: ICD-10-CM

## 2018-01-09 DIAGNOSIS — M19.072 PRIMARY OSTEOARTHRITIS OF LEFT FOOT: Primary | ICD-10-CM

## 2018-01-09 NOTE — PATIENT INSTRUCTIONS
Please follow up after the MRI. You are advised to contact us if your condition worsens. Foot Pain: Care Instructions  Your Care Instructions  Foot injuries that cause pain and swelling are fairly common. Almost all sports or home repair projects can cause a misstep that ends up as foot pain. Normal wear and tear, especially as you get older, also can cause foot pain. Most minor foot injuries will heal on their own, and home treatment is usually all you need to do. If you have a severe injury, you may need tests and treatment. Follow-up care is a key part of your treatment and safety. Be sure to make and go to all appointments, and call your doctor if you are having problems. It's also a good idea to know your test results and keep a list of the medicines you take. How can you care for yourself at home? · Take pain medicines exactly as directed. ¨ If the doctor gave you a prescription medicine for pain, take it as prescribed. ¨ If you are not taking a prescription pain medicine, ask your doctor if you can take an over-the-counter medicine. · Rest and protect your foot. Take a break from any activity that may cause pain. · Put ice or a cold pack on your foot for 10 to 20 minutes at a time. Put a thin cloth between the ice and your skin. · Prop up the sore foot on a pillow when you ice it or anytime you sit or lie down during the next 3 days. Try to keep it above the level of your heart. This will help reduce swelling. · Your doctor may recommend that you wrap your foot with an elastic bandage. Keep your foot wrapped for as long as your doctor advises. · If your doctor recommends crutches, use them as directed. · Wear roomy footwear. · As soon as pain and swelling end, begin gentle exercises of your foot. Your doctor can tell you which exercises will help. When should you call for help? Call 911 anytime you think you may need emergency care.  For example, call if:  ? · Your foot turns pale, white, blue, or cold. ?Call your doctor now or seek immediate medical care if:  ? · You cannot move or stand on your foot. ? · Your foot looks twisted or out of its normal position. ? · Your foot is not stable when you step down. ? · You have signs of infection, such as:  ¨ Increased pain, swelling, warmth, or redness. ¨ Red streaks leading from the sore area. ¨ Pus draining from a place on your foot. ¨ A fever. ? · Your foot is numb or tingly. ? Watch closely for changes in your health, and be sure to contact your doctor if:  ? · You do not get better as expected. ? · You have bruises from an injury that last longer than 2 weeks. Where can you learn more? Go to http://angie-dayanara.info/. Enter R714 in the search box to learn more about \"Foot Pain: Care Instructions. \"  Current as of: March 21, 2017  Content Version: 11.4  © 8707-2247 [employer]. Care instructions adapted under license by [employer] (which disclaims liability or warranty for this information). If you have questions about a medical condition or this instruction, always ask your healthcare professional. [place] [address] any warranty or liability for your use of this information.

## 2018-01-09 NOTE — PROGRESS NOTES
AMBULATORY PROGRESS NOTE      Patient: Goldy Sutton             MRN: [id number]     SSN: [id number] Body mass index is 37.61 kg/(m^2). YOB: 1940     AGE: 76 y.o. EX: female    PCP: Anurag Rinaldi MD    IMPRESSION/DIAGNOSIS AND TREATMENT PLAN     DIAGNOSES  1. Primary osteoarthritis of left foot    2. Left foot pain    3. Varicose veins of left leg with edema        Orders Placed This Encounter    [[id number]] Foot Min 3V    MRI FOOT LT [address] [place] [address] understands her diagnoses and the proposed plan. Plan:    1) MRI without IV Contrast of the Left Foot; assess anterior calcaneus and 4/5 TMT joints. 2) Vascular consult: may be needed if MRI provides no Orthopaedic  information on her swelling    RTO - After MRI left foot: assess possible cyst to calcaneus and assess 4/5 TMT region. HPI Esdras Herron IS A 76 y.o. female who presents to my outpatient office for follow up evaluation of right posterior tibial tendonitis. At last visit, I recommended to continue obtaining supportive shoes and provided an order for evaluation of the AFO brace. Patient states she continues to experience pain and discomfort along the left foot. She notes her bilateral foot can become swollen but her left foot and ankle has become more swollen lately. She notes most of her discomfort arises from the fourth TMT region. Additionally, reports that prolonged ambulation or standing elicits the pain and discomfort. XR imaging have been reviewed with the patient. She is wearing a hinged AFO brace on the right and supportive shoes. Left ANKLE and FOOT     Gait: slow and uses assistive device   Tenderness: mild tenderness of the lateral left foot along 4/5 TMT region. .   Cutaneous: No rashes, skin patches, wounds, or abrasions to the lower legs                                Warm and Normal color.  No regions of expressible drainage.  Rosalinda Garcia Border of [place] Region: mild                                Skin color, texture, turgor normal. Normal.          Moderate swelling to the medial and lateral ankle. Joint Motion: ROM Ankle: Normal , Hindfoot: (ST,TN,CC) Normal, Forefoot toes: Normal  Neurologic Exam: Neuro: Motor: normal 5/5 strength in all tested muscle groups and Sensory: no sensory deficits noted. Contractures: Gastrocnemius or Achilles Contractures absent  Joint / Tendon Stability: No Ankle or Subtalar instability or joint laxity.                                                               No peroneal sublux ability or dislocation  Alignment: Pes Planus Alignment and  Flexible  Vascular: Normal Pulses/ NL Capillary refill, No evidence of DVT seen on physical exam.                        No calf swelling, no tenderness to calf muscles. Lymphatic:  No Evidence of Lymphedema. CHART REVIEW     Past Medical History:   Diagnosis Date    Arthritis     Arthropathy, unspecified, site unspecified     Bruising     bruising and bleeding    Diverticulitis     Diverticulosis     Essential hypertension     Fibromyalgia     GERD (gastroesophageal reflux disease)     Hearing loss     Obesity (BMI 30-39. 9)     Postoperative respiratory failure ([address] 9/30/10    Pre-diabetes     11/16 diet controlled    Renal cell cancer ([facility]     Stage [id number]    Respiratory failure, post-operative ([address]     Wears glasses      Current Outpatient Prescriptions   Medication Sig    metoprolol succinate (TOPROL-XL) 200 mg XL tablet Take 1 Tab by mouth daily.  doxazosin (CARDURA) 4 mg tablet Take 1 Tab by mouth nightly.  DULoxetine (CYMBALTA) 20 mg capsule Take 1 Cap by mouth daily.  acetaminophen (TYLENOL EXTRA STRENGTH) 500 mg tablet Take  by mouth every six (6) hours as needed for Pain.  cholecalciferol, vitamin D3, (VITAMIN D3) 2,000 unit tab Take  by mouth.  MULTIVIT-MIN/FOLIC ACID/VIT K1 (MULTI FOR HER 50 PLUS PO) Take  by mouth.     fish oil-dha-epa 1,200-144-216 mg cap Take  by mouth.  lidocaine-prilocaine (EMLA) topical cream     ACCU-CHEK SOFTCLIX LANCETS misc     ACCU-CHEK TAMY PLUS TEST STRP strip     loratadine (CLARITIN) 10 mg tablet Take 10 mg by mouth.  aluminum hydrox-magnesium carb (GAVISCON)  mg/15 mL suspension Take 15 mL by mouth every six (6) hours as needed for Indigestion.  triamcinolone-emollient cmb#86 0.1 % crea 1 Applicator by Apply Externally route daily as needed.  omega-3 fatty acids-vitamin e (FISH OIL) 1,000 mg Cap Take 1 Cap by mouth.  omeprazole (PRILOSEC) 20 mg capsule Take 40 mg by mouth two (2) times a day.  amLODIPine (NORVASC) 10 mg tablet Take  by mouth daily.  calcium carbonate (OS-WATSON) 500 mg calcium (1,250 mg) tablet Take 500 mg by mouth daily.  Hydrochlorothiazide 12.5 mg tablet Take 12.5 mg by mouth every other day. No current facility-administered medications for this visit. Allergies   Allergen Reactions    Aspirin Other (comments)    Codeine Other (comments)     Gi distress      Darvon [Propoxyphene] Rash and Itching     Gi distress    Dextromethorphan Other (comments)     Stomach cramps    Meperidine Hives     Gi distress    Morphine Other (comments)     Neurological reaction    Other Medication Palpitations     Muscle relaxants    Pcn [Penicillins] Swelling    Propoxyphene N-Acetaminophen Hives    Sulfa (Sulfonamide Antibiotics) Hives     Past Surgical History:   Procedure Laterality Date    HX BREAST BIOPSY      HX BREAST REDUCTION      HX CATARACT REMOVAL      HX CHOLECYSTECTOMY      HX HERNIA REPAIR      HX HYSTERECTOMY  1984     total vaginal    HX OTHER SURGICAL  9/2010    SIGMOIDECTOMY    HX VEIN STRIPPING       Social History     Occupational History    Not on file.      Social History Main Topics    Smoking status: Never Smoker    Smokeless tobacco: Never Used    Alcohol use No    Drug use: No    Sexual activity: Not on file Family History   Problem Relation Age of Onset    Heart Attack Mother 79    Diabetes Other     Hypertension Other     Arthritis-osteo Other     Heart Disease Other     Heart Attack Sister 71       REVIEW OF SYSTEMS : 1/9/2018  ALL BELOW ARE Negative except : SEE HPI       Constitutional: Negative for fever, chills and weight loss. Neg Weigh Loss  Cardiovascular: Negative for chest pain, claudication and leg swelling. SOB, BAIG   Gastrointestinal: Negative for  pain, N/V/D/C, Blood in stool or urine,dysuria, hematuria,        Incontinence, pelvic pain  Musculoskeletal: see HPI. Neurological: Negative for dizziness and weakness. Negative for headaches,Visual Changes, Confusion, Seizures,   Psychiatric/Behavioral: Negative for depression, memory loss and substance abuse. Extremities:  Negative for  hair changes, rash or skin lesion changes. Hematologic: Negative for Bleeding problems, bruising, pallor or swollen lymph nodes. Peripheral Vascular: No calf pain, vascular vein tenderness to calf pain              No calf throbbing, posterior knee throbbing pain    DIAGNOSTIC IMAGING      Dictation on: 01/09/2018 12:28 PM by: Catalino Sutton [[id number]]         Written by Chanda Morillo, as dictated by Royce Montes MD. Dr. ESTEFANI, Royce Montes MD, confirm that all documentation is accurate.

## 2018-01-09 NOTE — MR AVS SNAPSHOT
Visit Information Date & Time Provider Department Dept. Phone Encounter #  
 1/9/2018 10:30 AM Michelle Goodman MD [facility] [phone] [id number] Upcoming Health Maintenance Date Due DTaP/Tdap/Td series (1 - Tdap) 9/10/1959 ZOSTER VACCINE AGE 60> 7/10/1998 GLAUCOMA SCREENING Q2Y 9/10/2003 Pneumococcal 65+ High/Highest Risk (1 of 2 - PCV13) 9/10/2003 MEDICARE YEARLY EXAM 9/10/2003 Influenza Age 5 to Adult 8/1/2017 Allergies as of 1/9/2018  Review Complete On: 1/9/2018 By: Mario Montero Severity Noted Reaction Type Reactions Aspirin  03/26/2012    Other (comments) Codeine  03/26/2012    Other (comments) Gi distress Darvon [Propoxyphene]  03/26/2012    Rash, Itching Gi distress Dextromethorphan  04/24/2013    Other (comments) Stomach cramps Meperidine  03/26/2012    Hives Gi distress Morphine  03/26/2012    Other (comments) Neurological reaction Other Medication  10/24/2016    Palpitations Muscle relaxants Pcn [Penicillins]  03/26/2012    Swelling Propoxyphene N-acetaminophen  03/26/2012    Hives Sulfa (Sulfonamide Antibiotics)  03/26/2012    Hives Current Immunizations  Never Reviewed No immunizations on file. Not reviewed this visit You Were Diagnosed With   
  
 Codes Comments Left foot pain    -  Primary ICD-10-CM: U99.759 ICD-9-CM: 729.5 Vitals BP Pulse Resp Height(growth percentile) Weight(growth percentile) SpO2  
 145/72 (BP 1 Location: Right arm, BP Patient Position: Sitting) 74 16 5' 6\" (1.676 m) 233 lb (105.7 kg) 96% BMI OB Status Smoking Status 37.61 kg/m2 Hysterectomy Never Smoker Vitals History BMI and BSA Data Body Mass Index Body Surface Area  
 37.61 kg/m 2 2.22 m 2 Preferred Pharmacy Pharmacy Name Phone  [address], [address] [phone] Your Updated Medication List  
  
   
This list is accurate as of: 1/9/18 12:23 PM.  Always use your most recent med list.  
  
  
  
  
 ACCU-CHEK TAMY PLUS TEST STRP strip Generic drug:  glucose blood VI test strips [address] Generic drug:  Lancets  
  
 aluminum hydrox-magnesium carb  mg/15 mL suspension Commonly known as:  GAVISCON Take 15 mL by mouth every six (6) hours as needed for Indigestion. calcium carbonate 500 mg calcium (1,250 mg) tablet Commonly known as:  OS-WATSON Take 500 mg by mouth daily. doxazosin 4 mg tablet Commonly known as:  CARDURA Take 1 Tab by mouth nightly. DULoxetine 20 mg capsule Commonly known as:  CYMBALTA Take 1 Cap by mouth daily. FISH OIL 1,000 mg Cap Generic drug:  omega-3 fatty acids-vitamin e Take 1 Cap by mouth. fish oil-dha-epa 1,200-144-216 mg Cap Take  by mouth. hydroCHLOROthiazide 12.5 mg tablet Commonly known as:  HYDRODIURIL Take 12.5 mg by mouth every other day. lidocaine-prilocaine topical cream  
Commonly known as:  EMLA  
  
 loratadine 10 mg tablet Commonly known as:  [place] Take 10 mg by mouth.  
  
 metoprolol succinate 200 mg XL tablet Commonly known as:  TOPROL-XL Take 1 Tab by mouth daily. MULTI FOR HER 50 PLUS PO Take  by mouth. NORVASC 10 mg tablet Generic drug:  amLODIPine Take  by mouth daily. PriLOSEC 20 mg capsule Generic drug:  omeprazole Take 40 mg by mouth two (2) times a day. triamcinolone-emollient comb86 0.1 % Crea 1 Applicator by Apply Externally route daily as needed. TYLENOL EXTRA STRENGTH 500 mg tablet Generic drug:  acetaminophen Take  by mouth every six (6) hours as needed for Pain. VITAMIN D3 2,000 unit Tab Generic drug:  cholecalciferol (vitamin D3) Take  by mouth. We Performed the Following AMB POC XRAY, FOOT; COMPLETE, 3+ VIEW [85562 CPT(R)] Patient Instructions Please follow up after the MRI. You are advised to contact us if your condition worsens. Foot Pain: Care Instructions Your Care Instructions Foot injuries that cause pain and swelling are fairly common. Almost all sports or home repair projects can cause a misstep that ends up as foot pain. Normal wear and tear, especially as you get older, also can cause foot pain. Most minor foot injuries will heal on their own, and home treatment is usually all you need to do. If you have a severe injury, you may need tests and treatment. Follow-up care is a key part of your treatment and safety. Be sure to make and go to all appointments, and call your doctor if you are having problems. It's also a good idea to know your test results and keep a list of the medicines you take. How can you care for yourself at home? · Take pain medicines exactly as directed. ¨ If the doctor gave you a prescription medicine for pain, take it as prescribed. ¨ If you are not taking a prescription pain medicine, ask your doctor if you can take an over-the-counter medicine. · Rest and protect your foot. Take a break from any activity that may cause pain. · Put ice or a cold pack on your foot for 10 to 20 minutes at a time. Put a thin cloth between the ice and your skin. · Prop up the sore foot on a pillow when you ice it or anytime you sit or lie down during the next 3 days. Try to keep it above the level of your heart. This will help reduce swelling. · Your doctor may recommend that you wrap your foot with an elastic bandage. Keep your foot wrapped for as long as your doctor advises. · If your doctor recommends crutches, use them as directed. · Wear roomy footwear. · As soon as pain and swelling end, begin gentle exercises of your foot. Your doctor can tell you which exercises will help. When should you call for help? Call 911 anytime you think you may need emergency care. For example, call if: 
? · Your foot turns pale, white, blue, or cold. ?Call your doctor now or seek immediate medical care if: 
? · You cannot move or stand on your foot. ? · Your foot looks twisted or out of its normal position. ? · Your foot is not stable when you step down. ? · You have signs of infection, such as: 
¨ Increased pain, swelling, warmth, or redness. ¨ Red streaks leading from the sore area. ¨ Pus draining from a place on your foot. ¨ A fever. ? · Your foot is numb or tingly. ? Watch closely for changes in your health, and be sure to contact your doctor if: 
? · You do not get better as expected. ? · You have bruises from an injury that last longer than 2 weeks. Where can you learn more? Go to http://angie-dayanara.info/. Enter V379 in the search box to learn more about \"Foot Pain: Care Instructions. \" Current as of: March 21, 2017 Content Version: 11.4 © 6300-0951 [employer]. Care instructions adapted under license by [employer] (which disclaims liability or warranty for this information). If you have questions about a medical condition or this instruction, always ask your healthcare professional. [address] any warranty or liability for your use of this information. Introducing [facility]! Al Black introduces YoungCurrent patient portal. Now you can access parts of your medical record, email your doctor's office, and request medication refills online. 1. In your internet browser, go to [email or web address] 2. Click on the First Time User? Click Here link in the Sign In box. You will see the New Member Sign Up page. 3. Enter your YoungCurrent Access Code exactly as it appears below. You will not need to use this code after youve completed the sign-up process.  If you do not sign up before the expiration date, you must request a new code. · Virdia Access Code: [email or web address] Expires: 3/13/2018 12:21 PM 
 
4. Enter the last four digits of your Social Security Number (xxxx) and Date of Birth (mm/dd/yyyy) as indicated and click Submit. You will be taken to the next sign-up page. 5. Create a Virdia ID. This will be your Virdia login ID and cannot be changed, so think of one that is secure and easy to remember. 6. Create a Virdia password. You can change your password at any time. 7. Enter your Password Reset Question and Answer. This can be used at a later time if you forget your password. 8. Enter your e-mail address. You will receive e-mail notification when new information is available in [address] 9. Click Sign Up. You can now view and download portions of your medical record. 10. Click the Download Summary menu link to download a portable copy of your medical information. If you have questions, please visit the Frequently Asked Questions section of the Virdia website. Remember, Virdia is NOT to be used for urgent needs. For medical emergencies, dial 911. Now available from your iPhone and Android! Please provide this summary of care documentation to your next provider. Your primary care clinician is listed as Caty Pierre. If you have any questions after today's visit, please call [phone].

## 2018-01-11 ENCOUNTER — DOCUMENTATION ONLY (OUTPATIENT)
Dept: ORTHOPEDIC SURGERY | Age: 78
End: 2018-01-11

## 2018-02-05 ENCOUNTER — OFFICE VISIT (OUTPATIENT)
Dept: ORTHOPEDIC SURGERY | Age: 78
End: 2018-02-05

## 2018-02-05 VITALS
HEART RATE: 68 BPM | HEIGHT: 66 IN | BODY MASS INDEX: 37.22 KG/M2 | SYSTOLIC BLOOD PRESSURE: 191 MMHG | TEMPERATURE: 96.9 F | OXYGEN SATURATION: 96 % | DIASTOLIC BLOOD PRESSURE: 83 MMHG | WEIGHT: 231.6 LBS

## 2018-02-05 DIAGNOSIS — I83.892 VARICOSE VEINS OF LEFT LEG WITH EDEMA: ICD-10-CM

## 2018-02-05 DIAGNOSIS — M79.672 LEFT FOOT PAIN: ICD-10-CM

## 2018-02-05 DIAGNOSIS — M76.821 POSTERIOR TIBIAL TENDINITIS, RIGHT: ICD-10-CM

## 2018-02-05 DIAGNOSIS — M19.072 PRIMARY OSTEOARTHRITIS OF LEFT FOOT: ICD-10-CM

## 2018-02-05 DIAGNOSIS — M54.5 LOW BACK PAIN, UNSPECIFIED BACK PAIN LATERALITY, UNSPECIFIED CHRONICITY, WITH SCIATICA PRESENCE UNSPECIFIED: Primary | ICD-10-CM

## 2018-02-05 NOTE — PROGRESS NOTES
AMBULATORY PROGRESS NOTE      Patient: Elkin Hardy             MRN: [id number]     SSN: [id number] Body mass index is 37.38 kg/(m^2). YOB: 1940     AGE: 76 y.o. EX: female    PCP: Malaika Eagle MD    IMPRESSION/DIAGNOSIS AND TREATMENT PLAN     DIAGNOSES  1. Low back pain, unspecified back pain laterality, unspecified chronicity, with sciatica presence unspecified    2. Primary osteoarthritis of left foot    3. Varicose veins of left leg with edema    4. Posterior tibial tendinitis, right        Orders Placed This Encounter   Anjali Mejia Ref [address] understands her diagnoses and the proposed plan. She is going to follow up with vascular for PVL studies, as she has varicose veins and has activity-related swelling in her lower legs. She is going to follow up with a podiatrist as recommended by her PCP, Dr. Kyra Malcolm. I recommended her seeing a podiatrist for her foot. I want to see her in two to three months from an orthopedic foot and ankle standpoint. I explained to her it does not really make much sense having two foot and ankle providers seeing her. However, this is what her PCP wants, she states. When I examine her left foot and right foot, she has some slight fullness to the number four and five TMT region, slight fullness, which is painful when I palpate the area. When I do not palpate and I range her ankle, as it relates to dorsiflexion, plantarflexion, inversion, and eversion, it does not cause her any discomfort. The MRI showed what appears to be a benign cyst to her calcaneus. This explains the lucency on her calcaneus. There is nothing orthopedic to do at this point. Plan:    1) F/u with Vascular. RTO - 3 months    HPI AND EXAMINATION     Elkin Hardy IS A 76 y.o. female who presents to my outpatient office for follow up evaluation of left posterior tibial tendonitis.  At last visit, I ordered a MRI of the left foot and provided a referral for a Vascular Consult. Patient presents to the office for review of her MRI that showed a spherical cystic lesion in the anterior calcaneus. She is scheduled to see the Vascular group and a podiatrist as recommended by her PCP. She wears Dr. Fei Tan. Patient continues to experience pain and discomfort along her left ankle. Additionally she notes her ankles continue to swell intermittently after prolonged ambulation. Left ANKLE and FOOT     Gait: slow and uses assistive device   Tenderness: mild tenderness of the lateral left foot along 4/5 TMT region. .   Cutaneous: No rashes, skin patches, wounds, or abrasions to the lower legs                                Warm and Normal color. No regions of expressible drainage.                                Medial Border of Tibia Region: mild                                Skin color, texture, turgor normal. Normal.                                Fullness along the fourth and fifth TMT joints. Joint Motion: ROM Ankle: Normal , Hindfoot: (ST,TN,CC) Normal, Forefoot toes: Normal  Neurologic Exam: Neuro: Motor: normal 5/5 strength in all tested muscle groups and Sensory: no sensory deficits noted. Contractures: Gastrocnemius or Achilles Contractures absent  Joint / Tendon Stability: No Ankle or Subtalar instability or joint laxity.                                                               No peroneal sublux ability or dislocation  Alignment: Pes Planus Alignment and  Flexible  Vascular: Normal Pulses/ NL Capillary refill, No evidence of DVT seen on physical exam.                        No calf swelling, no tenderness to calf muscles. Lymphatic:  No Evidence of Lymphedema.     CHART REVIEW     Past Medical History:   Diagnosis Date    Arthritis     Arthropathy, unspecified, site unspecified     Bruising     bruising and bleeding    Diverticulitis     Diverticulosis     Essential hypertension     Fibromyalgia     GERD (gastroesophageal reflux disease)     Hearing loss     Obesity (BMI 30-39. 9)     Postoperative respiratory failure ([facility] 9/30/10    Pre-diabetes     11/16 diet controlled    Renal cell cancer ([facility]     Stage [id number]    Respiratory failure, post-operative ([facility]     Wears glasses      Current Outpatient Prescriptions   Medication Sig    metoprolol succinate (TOPROL-XL) 200 mg XL tablet Take 1 Tab by mouth daily.  doxazosin (CARDURA) 4 mg tablet Take 1 Tab by mouth nightly.  DULoxetine (CYMBALTA) 20 mg capsule Take 1 Cap by mouth daily.  acetaminophen (TYLENOL EXTRA STRENGTH) 500 mg tablet Take  by mouth every six (6) hours as needed for Pain.  cholecalciferol, vitamin D3, (VITAMIN D3) 2,000 unit tab Take  by mouth.  MULTIVIT-MIN/FOLIC ACID/VIT K1 (MULTI FOR HER 50 PLUS PO) Take  by mouth.  fish oil-dha-epa 1,200-144-216 mg cap Take  by mouth.  lidocaine-prilocaine (EMLA) topical cream     ACCU-CHEK SOFTCLIX LANCETS misc     ACCU-CHEK TAMY PLUS TEST STRP strip     loratadine (CLARITIN) 10 mg tablet Take 10 mg by mouth.  aluminum hydrox-magnesium carb (GAVISCON)  mg/15 mL suspension Take 15 mL by mouth every six (6) hours as needed for Indigestion.  triamcinolone-emollient cmb#86 0.1 % crea 1 Applicator by Apply Externally route daily as needed.  omega-3 fatty acids-vitamin e (FISH OIL) 1,000 mg Cap Take 1 Cap by mouth.  omeprazole (PRILOSEC) 20 mg capsule Take 40 mg by mouth two (2) times a day.  amLODIPine (NORVASC) 10 mg tablet Take  by mouth daily.  calcium carbonate (OS-WATSON) 500 mg calcium (1,250 mg) tablet Take 500 mg by mouth daily.  Hydrochlorothiazide 12.5 mg tablet Take 12.5 mg by mouth every other day. No current facility-administered medications for this visit.       Allergies   Allergen Reactions    Aspirin Other (comments)    Codeine Other (comments)     Gi distress      Darvon [Propoxyphene] Rash and Itching     Gi distress    Dextromethorphan Other (comments)     Stomach cramps    Meperidine Hives     Gi distress    Morphine Other (comments)     Neurological reaction    Other Medication Palpitations     Muscle relaxants    Pcn [Penicillins] Swelling    Propoxyphene N-Acetaminophen Hives    Sulfa (Sulfonamide Antibiotics) Hives     Past Surgical History:   Procedure Laterality Date    HX BREAST BIOPSY      HX BREAST REDUCTION      HX CATARACT REMOVAL      HX CHOLECYSTECTOMY      HX HERNIA REPAIR      HX HYSTERECTOMY  1984     total vaginal    HX OTHER SURGICAL  9/2010    SIGMOIDECTOMY    HX VEIN STRIPPING       Social History     Occupational History    Not on file. Social History Main Topics    Smoking status: Never Smoker    Smokeless tobacco: Never Used    Alcohol use No    Drug use: No    Sexual activity: Not on file     Family History   Problem Relation Age of Onset    Heart Attack Mother 79    Diabetes Other     Hypertension Other     Arthritis-osteo Other     Heart Disease Other     Heart Attack Sister 71       REVIEW OF SYSTEMS : 2/5/2018  ALL BELOW ARE Negative except : SEE HPI       Constitutional: Negative for fever, chills and weight loss. Neg Weigh Loss  Cardiovascular: Negative for chest pain, claudication and leg swelling. SOB, BAIG   Gastrointestinal: Negative for  pain, N/V/D/C, Blood in stool or urine,dysuria, hematuria,        Incontinence, pelvic pain  Musculoskeletal: see HPI. Neurological: Negative for dizziness and weakness. Negative for headaches,Visual Changes, Confusion, Seizures,   Psychiatric/Behavioral: Negative for depression, memory loss and substance abuse. Extremities:  Negative for  hair changes, rash or skin lesion changes. Hematologic: Negative for Bleeding problems, bruising, pallor or swollen lymph nodes.   Peripheral Vascular: No calf pain, vascular vein tenderness to calf pain              No calf throbbing, posterior knee throbbing pain    DIAGNOSTIC IMAGING  [facility] IMAGING : INTERPRETATION BY RADIOLOGIST      Collapse All  2018 January  MR LOWER EXT W/ JOINT LT W/O CONTRAST1/30/2018  [facility]  Result Impression   IMPRESSION:    1.  Minimal chondromalacia over the distal pole of the navicular, but otherwise no findings in the midfoot.  Specifically, Lisfranc articulations are unremarkable.  No etiology for midfoot pain identified. 2.  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    3.  Low-grade central cord plantar fasciitis. 4.  Mild varices in the sinus tarsi, of questionable clinical significance. 5.  Considerable edema over the lower leg, hindfoot and midfoot.  Given the generalized nature of the edema distribution, regional or systemic etiologies are most likely. Result Narrative   ============================  [facility]  ============================    EXAM: MRI left Ankle    HISTORY:   -From Provider: Pain in left foot; J47.436  -Additional: Pain is apparently over the lateral midfoot in the region of TMT4, according to office notes. TECHNIQUE: T1 weighted sagittal, STIR sagittal, proton FSE axial, fat suppressed T2 FSE axial, fat suppressed T2 FSE coronal, proton FSE oblique axial.  The field of view is extended anteriorly to include the specific region of interest identified by the patient. FINDINGS:    Comparison examination: None    Reference Exams: None    TALOCRURAL JOINT: Unremarkable.  Intact joint surfaces and ligaments. TALUS, SINUS TARSI, SUBTALAR JOINT: Unremarkable. CALCANEUS, ACHILLES TENDON, PLANTAR FASCIA: Minimal low signal intensity thickening of the central cord plantar fascia with slight surrounding edema.  Roughly spherical cystic lesion in the anterior calcaneus containing some internal septations and measuring 1.3 cm in diameter, sharply circumscribed, with no associated bone marrow edema.     TARSAL TUNNEL: Contains mild varices, but probably not enough to produce any significant neural impingement. REMAINING ANKLE TENDONS: Normal in morphology and signal intensity. VISUALIZED MID FOOT, INCLUDING CHOPART, MIDTARSAL AND LISFRANC ARTICULATIONS: Minimal subchondral cystic change in the distal pole of the navicular on the lateral side.  Otherwise unremarkable.  Specifically, cuboid and cuneiforms are unremarkable.  Lisfranc articulations are unremarkable. MISCELLANEOUS: Moderate circumferential edema seen about the lower leg and ankle, with less edema around the hindfoot and midfoot.    ----   Status Results Details     Encounter Summary       Written by Abelardo Goodson, as dictated by Ada Johnson MD. IDr., Ada Johnson MD, confirm that all documentation is accurate.

## 2018-02-05 NOTE — PATIENT INSTRUCTIONS
Please follow up in 3 months. You are advised to contact us if your condition worsens. Arthritis: Care Instructions  Your Care Instructions  Arthritis, also called osteoarthritis, is a breakdown of the cartilage that cushions your joints. When the cartilage wears down, your bones rub against each other. This causes pain and stiffness. Many people have some arthritis as they age. Arthritis most often affects the joints of the spine, hands, hips, knees, or feet. You can take simple measures to protect your joints, ease your pain, and help you stay active. Follow-up care is a key part of your treatment and safety. Be sure to make and go to all appointments, and call your doctor if you are having problems. It's also a good idea to know your test results and keep a list of the medicines you take. How can you care for yourself at home? · Stay at a healthy weight. Being overweight puts extra strain on your joints. · Talk to your doctor or physical therapist about exercises that will help ease joint pain. ¨ Stretch. You may enjoy gentle forms of yoga to help keep your joints and muscles flexible. ¨ Walk instead of jog. Other types of exercise that are less stressful on the joints include riding a bicycle, swimming, rashad chi, or water exercise. ¨ Lift weights. Strong muscles help reduce stress on your joints. Stronger thigh muscles, for example, take some of the stress off of the knees and hips. Learn the right way to lift weights so you do not make joint pain worse. · Take your medicines exactly as prescribed. Call your doctor if you think you are having a problem with your medicine. · Take pain medicines exactly as directed. ¨ If the doctor gave you a prescription medicine for pain, take it as prescribed. ¨ If you are not taking a prescription pain medicine, ask your doctor if you can take an over-the-counter medicine. · Use a cane, crutch, walker, or another device if you need help to get around.  These can help rest your joints. You also can use other things to make life easier, such as a higher toilet seat and padded handles on kitchen utensils. · Do not sit in low chairs, which can make it hard to get up. · Put heat or cold on your sore joints as needed. Use whichever helps you most. You also can take turns with hot and cold packs. ¨ Apply heat 2 or 3 times a day for 20 to 30 minutes-using a heating pad, hot shower, or hot pack-to relieve pain and stiffness. ¨ Put ice or a cold pack on your sore joint for 10 to 20 minutes at a time. Put a thin cloth between the ice and your skin. When should you call for help? Call your doctor now or seek immediate medical care if:  ? · You have sudden swelling, warmth, or pain in any joint. ? · You have joint pain and a fever or rash. ? · You have such bad pain that you cannot use a joint. ? Watch closely for changes in your health, and be sure to contact your doctor if:  ? · You have mild joint symptoms that continue even with more than 6 weeks of care at home. ? · You have stomach pain or other problems with your medicine. Where can you learn more? Go to http://angie-dayanara.info/. Enter D562 in the search box to learn more about \"Arthritis: Care Instructions. \"  Current as of: October 31, 2016  Content Version: 11.4  © 4959-1912 [employer]. Care instructions adapted under license by [employer] (which disclaims liability or warranty for this information). If you have questions about a medical condition or this instruction, always ask your healthcare professional. [address] any warranty or liability for your use of this information.

## 2018-02-05 NOTE — MR AVS SNAPSHOT
[address] [place] [address], [address] [facility] 
[phone] Patient: [facility] MRN: [id number] Visit Information Date & Time Provider Department Dept. Phone Encounter #  
 2/5/2018 10:20 AM Chloe Holt MD [facility] [phone] [id number] Your Appointments 5/14/2018  9:00 AM  
ULTRASOUND with [facility] ULTRASOUND [facility] ([facility]) Appt Note: rcc/disha/mdf  
 [address] [address]  
[phone]  
  
   
 [facility] [id number] 5/14/2018  9:30 AM  
ESTABLISHED PATIENT with Duane Kennedy MD  
[facility]) Appt Note: rcc  
 [address], [address]  
  
   
 [address], [address] [facility] [address] Upcoming Health Maintenance Date Due DTaP/Tdap/Td series (1 - Tdap) 9/10/1959 ZOSTER VACCINE AGE 60> 7/10/1998 GLAUCOMA SCREENING Q2Y 9/10/2003 Pneumococcal 65+ High/Highest Risk (1 of 2 - PCV13) 9/10/2003 MEDICARE YEARLY EXAM 9/10/2003 Influenza Age 5 to Adult 8/1/2017 Allergies as of 2/5/2018  Review Complete On: 2/5/2018 By: Chloe Holt MD  
  
 Severity Noted Reaction Type Reactions Aspirin  03/26/2012    Other (comments) Codeine  03/26/2012    Other (comments) Gi distress Darvon [Propoxyphene]  03/26/2012    Rash, Itching Gi distress Dextromethorphan  04/24/2013    Other (comments) Stomach cramps Meperidine  03/26/2012    Hives Gi distress Morphine  03/26/2012    Other (comments) Neurological reaction Other Medication  10/24/2016    Palpitations Muscle relaxants Pcn [Penicillins]  03/26/2012    Swelling Propoxyphene N-acetaminophen  03/26/2012    Hives Sulfa (Sulfonamide Antibiotics)  03/26/2012    Hives Current Immunizations  Never Reviewed No immunizations on file. Not reviewed this visit Vitals BP Pulse Temp Height(growth percentile) Weight(growth percentile) SpO2  
 191/83 68 96.9 °F (36.1 °C) 5' 6\" (1.676 m) 231 lb 9.6 oz (105.1 kg) 96% BMI OB Status Smoking Status 37.38 kg/m2 Hysterectomy Never Smoker Vitals History BMI and BSA Data Body Mass Index Body Surface Area  
 37.38 kg/m 2 2.21 m 2 Preferred Pharmacy Pharmacy Name Phone Cristy Zacarias [address] [place] [phone] [facility] [address] [facility] [address] [phone] Your Updated Medication List  
  
   
This list is accurate as of: 2/5/18 11:43 AM.  Always use your most recent med list.  
  
  
  
  
 ACCU-CHEK TAMY PLUS TEST STRP strip Generic drug:  glucose blood VI test strips [address] Generic drug:  Lancets  
  
 aluminum hydrox-magnesium carb  mg/15 mL suspension Commonly known as:  GAVISCON Take 15 mL by mouth every six (6) hours as needed for Indigestion. calcium carbonate 500 mg calcium (1,250 mg) tablet Commonly known as:  OS-WATSON Take 500 mg by mouth daily. doxazosin 4 mg tablet Commonly known as:  CARDURA Take 1 Tab by mouth nightly. DULoxetine 20 mg capsule Commonly known as:  CYMBALTA Take 1 Cap by mouth daily. FISH OIL 1,000 mg Cap Generic drug:  omega-3 fatty acids-vitamin e Take 1 Cap by mouth. fish oil-dha-epa 1,200-144-216 mg Cap Take  by mouth. hydroCHLOROthiazide 12.5 mg tablet Commonly known as:  HYDRODIURIL Take 12.5 mg by mouth every other day. lidocaine-prilocaine topical cream  
Commonly known as:  EMLA  
  
 loratadine 10 mg tablet Commonly known as:  Tiajuana Bile Take 10 mg by mouth.  
  
 metoprolol succinate 200 mg XL tablet Commonly known as:  TOPROL-XL Take 1 Tab by mouth daily. MULTI FOR HER 50 PLUS PO Take  by mouth. NORVASC 10 mg tablet Generic drug:  amLODIPine Take  by mouth daily. PriLOSEC 20 mg capsule Generic drug:  omeprazole Take 40 mg by mouth two (2) times a day. triamcinolone-emollient comb86 0.1 % Crea 1 Applicator by Apply Externally route daily as needed. TYLENOL EXTRA STRENGTH 500 mg tablet Generic drug:  acetaminophen Take  by mouth every six (6) hours as needed for Pain. VITAMIN D3 2,000 unit Tab Generic drug:  cholecalciferol (vitamin D3) Take  by mouth. Patient Instructions Please follow up in 3 months. You are advised to contact us if your condition worsens. Arthritis: Care Instructions Your Care Instructions Arthritis, also called osteoarthritis, is a breakdown of the cartilage that cushions your joints. When the cartilage wears down, your bones rub against each other. This causes pain and stiffness. Many people have some arthritis as they age. Arthritis most often affects the joints of the spine, hands, hips, knees, or feet. You can take simple measures to protect your joints, ease your pain, and help you stay active. Follow-up care is a key part of your treatment and safety. Be sure to make and go to all appointments, and call your doctor if you are having problems. It's also a good idea to know your test results and keep a list of the medicines you take. How can you care for yourself at home? · Stay at a healthy weight. Being overweight puts extra strain on your joints. · Talk to your doctor or physical therapist about exercises that will help ease joint pain. ¨ Stretch. You may enjoy gentle forms of yoga to help keep your joints and muscles flexible. ¨ Walk instead of jog. Other types of exercise that are less stressful on the joints include riding a bicycle, swimming, rashad chi, or water exercise. ¨ Lift weights. Strong muscles help reduce stress on your joints.  Stronger thigh muscles, for example, take some of the stress off of the knees and hips. Learn the right way to lift weights so you do not make joint pain worse. · Take your medicines exactly as prescribed. Call your doctor if you think you are having a problem with your medicine. · Take pain medicines exactly as directed. ¨ If the doctor gave you a prescription medicine for pain, take it as prescribed. ¨ If you are not taking a prescription pain medicine, ask your doctor if you can take an over-the-counter medicine. · Use a cane, crutch, walker, or another device if you need help to get around. These can help rest your joints. You also can use other things to make life easier, such as a higher toilet seat and padded handles on kitchen utensils. · Do not sit in low chairs, which can make it hard to get up. · Put heat or cold on your sore joints as needed. Use whichever helps you most. You also can take turns with hot and cold packs. ¨ Apply heat 2 or 3 times a day for 20 to 30 minutes-using a heating pad, hot shower, or hot pack-to relieve pain and stiffness. ¨ Put ice or a cold pack on your sore joint for 10 to 20 minutes at a time. Put a thin cloth between the ice and your skin. When should you call for help? Call your doctor now or seek immediate medical care if: 
? · You have sudden swelling, warmth, or pain in any joint. ? · You have joint pain and a fever or rash. ? · You have such bad pain that you cannot use a joint. ? Watch closely for changes in your health, and be sure to contact your doctor if: 
? · You have mild joint symptoms that continue even with more than 6 weeks of care at home. ? · You have stomach pain or other problems with your medicine. Where can you learn more? Go to http://angie-dayanara.info/. Enter N059 in the search box to learn more about \"Arthritis: Care Instructions. \" Current as of: October 31, 2016 Content Version: 11.4 © 2792-4384 [employer]wise[employer]orporated. Care instructions adapted under license by [employer] (which disclaims liability or warranty for this information). If you have questions about a medical condition or this instruction, always ask your healthcare professional. [address] any warranty or liability for your use of this information. Introducing [address]! [facility] introduces [employer] patient portal. Now you can access parts of your medical record, email your doctor's office, and request medication refills online. 1. In your internet browser, go to [email or web address] 2. Click on the First Time User? Click Here link in the Sign In box. You will see the New Member Sign Up page. 3. Enter your [employer] Access Code exactly as it appears below. You will not need to use this code after youve completed the sign-up process. If you do not sign up before the expiration date, you must request a new code. · [employer] Access Code: [email or web address] Expires: 3/13/2018 12:21 PM 
 
4. Enter the last four digits of your Social Security Number (xxxx) and Date of Birth (mm/dd/yyyy) as indicated and click Submit. You will be taken to the next sign-up page. 5. Create a [employer] ID. This will be your [employer] login ID and cannot be changed, so think of one that is secure and easy to remember. 6. Create a [employer] password. You can change your password at any time. 7. Enter your Password Reset Question and Answer. This can be used at a later time if you forget your password. 8. Enter your e-mail address. You will receive e-mail notification when new information is available in [address] 9. Click Sign Up. You can now view and download portions of your medical record. 10. Click the Download Summary menu link to download a portable copy of your medical information.  
 
If you have questions, please visit the Frequently Asked Questions section of the [employer]. Remember, [employer] is NOT to be used for urgent needs. For medical emergencies, dial 911. Now available from your iPhone and Android! Please provide this summary of care documentation to your next provider. Your primary care clinician is listed as Lobo Graves. If you have any questions after today's visit, please call [phone].

## 2018-02-26 ENCOUNTER — OFFICE VISIT (OUTPATIENT)
Dept: ORTHOPEDIC SURGERY | Age: 78
End: 2018-02-26

## 2018-02-26 VITALS
BODY MASS INDEX: 37.8 KG/M2 | HEIGHT: 66 IN | DIASTOLIC BLOOD PRESSURE: 73 MMHG | HEART RATE: 66 BPM | WEIGHT: 235.2 LBS | SYSTOLIC BLOOD PRESSURE: 147 MMHG | RESPIRATION RATE: 16 BRPM

## 2018-02-26 DIAGNOSIS — G60.9 HEREDITARY PERIPHERAL NEUROPATHY: ICD-10-CM

## 2018-02-26 DIAGNOSIS — M51.36 DDD (DEGENERATIVE DISC DISEASE), LUMBAR: ICD-10-CM

## 2018-02-26 DIAGNOSIS — M47.816 SPONDYLOSIS OF LUMBAR REGION WITHOUT MYELOPATHY OR RADICULOPATHY: ICD-10-CM

## 2018-02-26 DIAGNOSIS — M54.16 LUMBAR NEURITIS: ICD-10-CM

## 2018-02-26 DIAGNOSIS — M79.7 FIBROMYALGIA: ICD-10-CM

## 2018-02-26 DIAGNOSIS — M54.50 LOW BACK PAIN WITHOUT SCIATICA, UNSPECIFIED BACK PAIN LATERALITY, UNSPECIFIED CHRONICITY: Primary | ICD-10-CM

## 2018-02-26 RX ORDER — FLUTICASONE PROPIONATE 50 MCG
SPRAY, SUSPENSION (ML) NASAL
COMMUNITY
Start: 2018-02-23 | End: 2022-08-13

## 2018-02-26 RX ORDER — MONTELUKAST SODIUM 10 MG/1
TABLET ORAL
Refills: 12 | COMMUNITY
Start: 2018-01-06 | End: 2018-04-12 | Stop reason: ALTCHOICE

## 2018-02-26 RX ORDER — DEXAMETHASONE SODIUM PHOSPHATE 1 MG/ML
SOLUTION/ DROPS OPHTHALMIC
Refills: 11 | COMMUNITY
Start: 2018-02-24 | End: 2018-04-12 | Stop reason: ALTCHOICE

## 2018-02-26 RX ORDER — DULOXETIN HYDROCHLORIDE 20 MG/1
20 CAPSULE, DELAYED RELEASE ORAL DAILY
Qty: 30 CAP | Refills: 1 | Status: SHIPPED | OUTPATIENT
Start: 2018-02-26 | End: 2019-06-17 | Stop reason: SINTOL

## 2018-02-26 NOTE — MR AVS SNAPSHOT
Darlene Turner 
 
 
 [address] [facility] 
[phone] Patient: [facility] MRN: [id number] Visit Information Date & Time Provider Department Dept. Phone Encounter #  
 2/26/2018  1:45 PM Jose Rafael Mayorga  [facility], [address] [facility] Mobile [phone] Follow-up Instructions Return for after MRI. Your Appointments 4/2/2018 10:20 AM  
Follow Up with Nancy Julien MD  
[facility] [place] ([facility]) Appt Note: LT FOOT 2 mo fu  
 [address], [address] [facility]  
[phone] [address] [facility], [address] 5/14/2018  9:00 AM  
ULTRASOUND with [facility] [address] ([facility]) Appt Note: rcc/disha/mdf  
 [address] [facility] [address] [facility] [address]  
[phone]  
  
   
 [facility] [address] [address] 5/14/2018  9:30 AM  
ESTABLISHED PATIENT with Karen Garvey MD  
[facility]) Appt Note: rcc  
 [address] [facility] [address], [address] [facility] [address]  
  
   
 301 [facility], [address] [place] [address] Upcoming Health Maintenance Date Due DTaP/Tdap/Td series (1 - Tdap) 9/10/1961 ZOSTER VACCINE AGE 60> 7/10/1998 GLAUCOMA SCREENING Q2Y 9/10/2003 Pneumococcal 65+ High/Highest Risk (1 of 2 - PCV13) 9/10/2003 MEDICARE YEARLY EXAM 9/10/2003 Influenza Age 5 to Adult 8/1/2017 Allergies as of 2/26/2018  Review Complete On: 2/26/2018 By: Jose Rafael Mayorga MD  
  
 Severity Noted Reaction Type Reactions Aspirin  03/26/2012    Other (comments) Codeine  03/26/2012    Other (comments) Gi distress Darvon [Propoxyphene]  03/26/2012    Rash, Itching Gi distress Dextromethorphan  04/24/2013    Other (comments) Stomach cramps Meperidine  03/26/2012    Hives Gi distress Morphine  03/26/2012    Other (comments) Neurological reaction Other Medication  10/24/2016    Palpitations Muscle relaxants Pcn [Penicillins]  03/26/2012    Swelling Propoxyphene N-acetaminophen  03/26/2012    Hives Sulfa (Sulfonamide Antibiotics)  03/26/2012    Hives Current Immunizations  Never Reviewed No immunizations on file. Not reviewed this visit You Were Diagnosed With   
  
 Codes Comments Low back pain without sciatica, unspecified back pain laterality, unspecified chronicity    -  Primary ICD-10-CM: M54.5 ICD-9-CM: 724.2 Fibromyalgia     ICD-10-CM: M79.7 ICD-9-CM: 729.1 Spondylosis of lumbar region without myelopathy or radiculopathy     ICD-10-CM: M47.816 ICD-9-CM: 721.3 DDD (degenerative disc disease), lumbar     ICD-10-CM: M51.36 
ICD-9-CM: 722.52 Lumbar neuritis     ICD-10-CM: M54.16 
ICD-9-CM: 724.4 Hereditary peripheral neuropathy     ICD-10-CM: G60.9 ICD-9-CM: 356.0 Vitals BP Pulse Resp Height(growth percentile) Weight(growth percentile) BMI  
 147/73 66 16 5' 6\" (1.676 m) 235 lb 3.2 oz (106.7 kg) 37.96 kg/m2 OB Status Smoking Status Hysterectomy Never Smoker Vitals History BMI and BSA Data Body Mass Index Body Surface Area  
 37.96 kg/m 2 2.23 m 2 Preferred Pharmacy Pharmacy Name Phone [id number] [address] [phone] Your Updated Medication List  
  
   
This list is accurate as of 2/26/18  3:11 PM.  Always use your most recent med list.  
  
  
  
  
 ACCU-CHEK TAMY PLUS TEST STRP strip Generic drug:  glucose blood VI test strips [address] Generic drug:  Lancets  
  
 aluminum hydrox-magnesium carb  mg/15 mL suspension Commonly known as:  GAVISCON  
 Take 15 mL by mouth every six (6) hours as needed for Indigestion. calcium carbonate 500 mg calcium (1,250 mg) tablet Commonly known as:  OS-WATSON Take 500 mg by mouth daily. dexamethasone 0.1 % ophthalmic solution Commonly known as:  DECADRON  
  
 doxazosin 4 mg tablet Commonly known as:  CARDURA Take 1 Tab by mouth nightly. * DULoxetine 20 mg capsule Commonly known as:  CYMBALTA Take 1 Cap by mouth daily. * DULoxetine 20 mg capsule Commonly known as:  CYMBALTA Take 1 Cap by mouth daily. FISH OIL 1,000 mg Cap Generic drug:  omega-3 fatty acids-vitamin e Take 1 Cap by mouth. fish oil-dha-epa 1,200-144-216 mg Cap Take  by mouth. fluticasone 50 mcg/actuation nasal spray Commonly known as:  FLONASE  
  
 hydroCHLOROthiazide 12.5 mg tablet Commonly known as:  HYDRODIURIL Take 12.5 mg by mouth every other day. lidocaine-prilocaine topical cream  
Commonly known as:  EMLA  
  
 loratadine 10 mg tablet Commonly known as:  Harvis Cardinal Take 10 mg by mouth.  
  
 metoprolol succinate 200 mg XL tablet Commonly known as:  TOPROL-XL Take 1 Tab by mouth daily. montelukast 10 mg tablet Commonly known as:  SINGULAIR  
  
 MULTI FOR HER 50 PLUS PO Take  by mouth. NORVASC 10 mg tablet Generic drug:  amLODIPine Take  by mouth daily. PriLOSEC 20 mg capsule Generic drug:  omeprazole Take 40 mg by mouth two (2) times a day. triamcinolone-emollient comb86 0.1 % Crea 1 Applicator by Apply Externally route daily as needed. TYLENOL EXTRA STRENGTH 500 mg tablet Generic drug:  acetaminophen Take  by mouth every six (6) hours as needed for Pain. VITAMIN D3 2,000 unit Tab Generic drug:  cholecalciferol (vitamin D3) Take  by mouth. * Notice: This list has 2 medication(s) that are the same as other medications prescribed for you.  Read the directions carefully, and ask your doctor or other care provider to review them with you. Prescriptions Sent to Pharmacy Refills DULoxetine (CYMBALTA) 20 mg capsule 1 Sig: Take 1 Cap by mouth daily. Class: Normal  
 Pharmacy: [id number], [address] [facility] #: [phone] Route: Oral  
  
We Performed the Following AMB POC XRAY, SPINE, LUMBOSACRAL; 2 O [92166 CPT(R)] Follow-up Instructions Return for after MRI. Introducing [facility]! [address] [facility] introduces [employer] patient portal. Now you can access parts of your medical record, email your doctor's office, and request medication refills online. 1. In your internet browser, go to [email or web address] 2. Click on the First Time User? Click Here link in the Sign In box. You will see the New Member Sign Up page. 3. Enter your [employer] Access Code exactly as it appears below. You will not need to use this code after youve completed the sign-up process. If you do not sign up before the expiration date, you must request a new code. · [employer] Access Code: [email or web address] Expires: 3/13/2018 12:21 PM 
 
4. Enter the last four digits of your Social Security Number (xxxx) and Date of Birth (mm/dd/yyyy) as indicated and click Submit. You will be taken to the next sign-up page. 5. Create a [employer] ID. This will be your [employer] login ID and cannot be changed, so think of one that is secure and easy to remember. 6. Create a [employer] password. You can change your password at any time. 7. Enter your Password Reset Question and Answer. This can be used at a later time if you forget your password. 8. Enter your e-mail address. You will receive e-mail notification when new information is available in [address] 9. Click Sign Up. You can now view and download portions of your medical record. 10. Click the Download Summary menu link to download a portable copy of your medical information. If you have questions, please visit the Frequently Asked Questions section of the [employer] website. Remember, [employer] is NOT to be used for urgent needs. For medical emergencies, dial 911. Now available from your iPhone and Android! Please provide this summary of care documentation to your next provider. Your primary care clinician is listed as Maya Sever. If you have any questions after today's visit, please call [phone].

## 2018-02-26 NOTE — PROGRESS NOTES
[facility]  [address], [place]   Phone: [phone]  Fax: [phone]        PROGRESS NOTE      HISTORY OF PRESENT ILLNESS:  The patient is a 76 y.o. female and was seen today for follow up of low back pain into the circumferential BLE and buttocks. Previously her pain radiated only into the LLE. Patient has been diagnosed FIBROMYALGIA. Note from Dr. Yulia Quick dated [facility] indicating patient was seen with c/o foot pain. She was started on Cymbalta and Tramadol. Patient reports she does not have a follow up appointment with the [facility] as she is not interested in their services. She denies ever starting Cymbalta as she is sensitive to medications. Note from Dr. Mary Carmen Herrera 10/2016 indicating patient is diagnosed with right posterior tibial tendonitis. Of note, patient wears an AFO. She admits completing her HEP but not daily. Patient denies hx of lumbar spinal surgery or blocks. She denies hx of DM, thyroid disease or alcoholism. 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 There is no evidence of motor involvement or radiculopathy per report. At her last clinical appointment, I tried patient on Cymbalta 20 mg per day. I referred her back to Dr. Nico Julian for a neuropathy workup. I will see the patient back following neuropathy workup. The patient returns today low back pain with bilateral groin pain extending into the BLE (L>R) posteriorly to the foot, with numbness and tingliniss on her RLE. Her pain is aggravated with lying down. She has additional complaints of shoulder blade pain. She rates pain 4-10/10, an increase since her last visit (4/10). Pt reports she never started on Cymbalta 20 mg every day Note from Dr. Nico Julian dated 2/14/17 indicating patient was seen for neuropathy workup.  At that time they talked about medications, which she declines. Labs at that time were reviewed and were normal. Note from Dr. Gokul Byers dated 1/9/18 indicating patient was seen with c/o left foot pain. X-ray read at that time reported intraosseous cyst, arthritic changes noted. At that time, she was set up for an MRI for further evaluation of her left foot. Per patient, the MRI noted the cyst on her left foot.  reviewed. Body mass index is 37.96 kg/(m^2). Past Medical History:   Diagnosis Date    Arthritis     Arthropathy, unspecified, site unspecified     Bruising     bruising and bleeding    Diverticulitis     Diverticulosis     Essential hypertension     Fibromyalgia     GERD (gastroesophageal reflux disease)     Hearing loss     Obesity (BMI 30-39. 9)     Postoperative respiratory failure ([address] 9/30/10    Pre-diabetes     11/16 diet controlled    Renal cell cancer ([address]     Stage T1NxMx    Respiratory failure, post-operative ([address]     Wears glasses         Social History     Social History    Marital status:      Spouse name: N/A    Number of children: N/A    Years of education: N/A     Occupational History    Not on file. Social History Main Topics    Smoking status: Never Smoker    Smokeless tobacco: Never Used    Alcohol use No    Drug use: No    Sexual activity: Not on file     Other Topics Concern    Not on file     Social History Narrative       Current Outpatient Prescriptions   Medication Sig Dispense Refill    fluticasone (FLONASE) 50 mcg/actuation nasal spray       dexamethasone (DECADRON) 0.1 % ophthalmic solution   11    DULoxetine (CYMBALTA) 20 mg capsule Take 1 Cap by mouth daily. 30 Cap 1    metoprolol succinate (TOPROL-XL) 200 mg XL tablet Take 1 Tab by mouth daily. 90 Tab 2    doxazosin (CARDURA) 4 mg tablet Take 1 Tab by mouth nightly. 90 Tab 2    acetaminophen (TYLENOL EXTRA STRENGTH) 500 mg tablet Take  by mouth every six (6) hours as needed for Pain.       aluminum hydrox-magnesium carb (GAVISCON)  mg/15 mL suspension Take 15 mL by mouth every six (6) hours as needed for Indigestion.  triamcinolone-emollient cmb#86 0.1 % crea 1 Applicator by Apply Externally route daily as needed.  omeprazole (PRILOSEC) 20 mg capsule Take 40 mg by mouth two (2) times a day.  amLODIPine (NORVASC) 10 mg tablet Take  by mouth daily.  Hydrochlorothiazide 12.5 mg tablet Take 12.5 mg by mouth every other day.  montelukast (SINGULAIR) 10 mg tablet   12    DULoxetine (CYMBALTA) 20 mg capsule Take 1 Cap by mouth daily. 30 Cap 1    cholecalciferol, vitamin D3, (VITAMIN D3) 2,000 unit tab Take  by mouth.  MULTIVIT-MIN/FOLIC ACID/VIT K1 (MULTI FOR HER 50 PLUS PO) Take  by mouth.  fish oil-dha-epa 1,200-144-216 mg cap Take  by mouth.  lidocaine-prilocaine (EMLA) topical cream       ACCU-CHEK SOFTCLIX LANCETS misc       ACCU-CHEK TAMY PLUS TEST STRP strip       loratadine (CLARITIN) 10 mg tablet Take 10 mg by mouth.  omega-3 fatty acids-vitamin e (FISH OIL) 1,000 mg Cap Take 1 Cap by mouth.  calcium carbonate (OS-WATSON) 500 mg calcium (1,250 mg) tablet Take 500 mg by mouth daily. Allergies   Allergen Reactions    Aspirin Other (comments)    Codeine Other (comments)     Gi distress      Darvon [Propoxyphene] Rash and Itching     Gi distress    Dextromethorphan Other (comments)     Stomach cramps    Meperidine Hives     Gi distress    Morphine Other (comments)     Neurological reaction    Other Medication Palpitations     Muscle relaxants    Pcn [Penicillins] Swelling    Propoxyphene N-Acetaminophen Hives    Sulfa (Sulfonamide Antibiotics) Hives          PHYSICAL EXAMINATION    Visit Vitals    /73    Pulse 66    Resp 16    Ht 5' 6\" (1.676 m)    Wt 235 lb 3.2 oz (106.7 kg)    BMI 37.96 kg/m2       CONSTITUTIONAL: NAD, A&O x 3  SENSATION:Decreased sensation to light touch of the LLE at L4/5.  Sensation to light touch otherwise intact. RANGE OF MOTION: The patient has full passive range of motion in all four extremities. MOTOR:  Straight Leg Raise: Negative, bilateral   MOOK SIGN: Negative, left      AFO noted on her RLE to prevent her ankle from collapsing. Hip Flex Knee Ext Knee Flex Ankle DF GTE Ankle PF Tone   Right +4/5 +4/5 +4/5 +4/5 +4/5 +4/5 +4/5   Left +4/5 +4/5 +4/5 +4/5 +4/5 +4/5 +4/5     RADIOGRAPHS  Preliminary reading of lumbar plain films:  Perhaps, some mild disc space narrowing at L4-L5. Anterior osteophytes noted at L1 through L4. No acute pathology identified. These are being sent out for official reading by Dr. Serene John. ASSESSMENT   Diagnoses and all orders for this visit:    1. Low back pain without sciatica, unspecified back pain laterality, unspecified chronicity  -     [68499] L/S 2-3 views  -     MRI LUMB SPINE WO CONT; Future    2. Fibromyalgia  -     MRI LUMB SPINE WO CONT; Future    3. Spondylosis of lumbar region without myelopathy or radiculopathy  -     MRI LUMB SPINE WO CONT; Future    4. DDD (degenerative disc disease), lumbar  -     MRI LUMB SPINE WO CONT; Future    5. Lumbar neuritis  -     MRI LUMB SPINE WO CONT; Future    6. Hereditary peripheral neuropathy  -     MRI LUMB SPINE WO CONT; Future    Other orders  -     DULoxetine (CYMBALTA) 20 mg capsule; Take 1 Cap by mouth daily. IMPRESSION AND PLAN:  The patient presents with low back and bilateral groin pain extending into the BLE (L>R) posteriorly to the foot, with numbness and tinglings on her RLE. I discussed multiple treatment options. I will set her up for an open lumbar spine MRI. I have advised patient to bring copies of report and films to next visit. In the interim, I will try her on Cymbalta 20 mg qd. The risks, benefits, and potential side effects of this medication were discussed. Patient understands and wishes to proceed. Patient advised to call the office if intolerant to new medication.  I will see patient back following MRI. Written by Kamla Quezada, as dictated by Yulissa Joseph MD  I examined the patient, reviewed and agree with the note.

## 2018-03-06 ENCOUNTER — HOSPITAL ENCOUNTER (OUTPATIENT)
Age: 78
Discharge: HOME | End: 2018-03-06
Attending: PHYSICAL MEDICINE & REHABILITATION
Payer: MEDICARE

## 2018-03-06 DIAGNOSIS — M79.7 FIBROMYALGIA: ICD-10-CM

## 2018-03-06 DIAGNOSIS — M54.50 LOW BACK PAIN WITHOUT SCIATICA, UNSPECIFIED BACK PAIN LATERALITY, UNSPECIFIED CHRONICITY: ICD-10-CM

## 2018-03-06 DIAGNOSIS — M54.16 LUMBAR NEURITIS: ICD-10-CM

## 2018-03-06 DIAGNOSIS — M51.36 DDD (DEGENERATIVE DISC DISEASE), LUMBAR: ICD-10-CM

## 2018-03-06 DIAGNOSIS — M47.816 SPONDYLOSIS OF LUMBAR REGION WITHOUT MYELOPATHY OR RADICULOPATHY: ICD-10-CM

## 2018-03-06 DIAGNOSIS — G60.9 HEREDITARY PERIPHERAL NEUROPATHY: ICD-10-CM

## 2018-03-06 PROCEDURE — 72148 MRI LUMBAR SPINE W/O DYE: CPT

## 2018-03-26 ENCOUNTER — OFFICE VISIT (OUTPATIENT)
Dept: ORTHOPEDIC SURGERY | Age: 78
End: 2018-03-26

## 2018-03-26 VITALS
WEIGHT: 237.6 LBS | HEART RATE: 72 BPM | SYSTOLIC BLOOD PRESSURE: 165 MMHG | OXYGEN SATURATION: 95 % | HEIGHT: 66 IN | BODY MASS INDEX: 38.18 KG/M2 | TEMPERATURE: 95.9 F | DIASTOLIC BLOOD PRESSURE: 76 MMHG

## 2018-03-26 DIAGNOSIS — M76.821 POSTERIOR TIBIAL TENDON DYSFUNCTION (PTTD) OF RIGHT LOWER EXTREMITY: Primary | ICD-10-CM

## 2018-03-26 DIAGNOSIS — M77.41 METATARSALGIA OF RIGHT FOOT: ICD-10-CM

## 2018-03-26 DIAGNOSIS — M54.16 LUMBAR RADICULOPATHY, RIGHT: ICD-10-CM

## 2018-03-26 NOTE — PATIENT INSTRUCTIONS
Continue to wear your brace  POSTERIOR TIBIAL TENDON DYSFUNCTION  discussed possible hind foot fusion (last option)  Follow up with Dr. Sukhjinder Garcia as scheduled  Call if symptoms worsen  Continue activities as tolerated in the brace  Stretching exercises (demonstrated and given)      Exercises  Your Care Instructions  Here are some examples of typical rehabilitation exercises for your condition. Start each exercise slowly. Ease off the exercise if you start to have pain. Your doctor or physical therapist will tell you when you can start these exercises and which ones will work best for you. How to do the exercises  Towel stretch    1. Sit with your legs extended and knees straight. 2. Place a towel around your foot just under the toes. 3. Hold each end of the towel in each hand, with your hands above your knees. 4. Pull back with the towel so that your foot stretches toward you. 5. Hold the position for at least 15 to 30 seconds. 6. Repeat 2 to 4 times a session, up to 5 sessions a day. Calf stretch    This exercise stretches the muscles at the back of the lower leg (the calf) and the Achilles tendon. Do this exercise 3 or 4 times a day, 5 days a week. 1. Stand facing a wall with your hands on the wall at about eye level. Put the leg you want to stretch about a step behind your other leg. 2. Keeping your back heel on the floor, bend your front knee until you feel a stretch in the back leg. 3. Hold the stretch for 15 to 30 seconds. Repeat 2 to 4 times. Plantar fascia and calf stretch    Stretching the plantar fascia and calf muscles can increase flexibility and decrease heel pain. You can do this exercise several times each day and before and after activity. 1. Stand on a step as shown above. Be sure to hold on to the banister. 2. Slowly let your heels down over the edge of the step as you relax your calf muscles.  You should feel a gentle stretch across the bottom of your foot and up the back of your leg to your knee. 3. Hold the stretch about 15 to 30 seconds, and then tighten your calf muscle a little to bring your heel back up to the level of the step. Repeat 2 to 4 times. Towel curls    Make this exercise more challenging by placing a weighted object, such as a soup can, on the other end of the towel. 1. While sitting, place your foot on a towel on the floor and scrunch the towel toward you with your toes. 2. Then, also using your toes, push the towel away from you. [place] pickups    1. Put marbles on the floor next to a cup.  2. Using your toes, try to lift the marbles up from the floor and put them in the cup. Follow-up care is a key part of your treatment and safety. Be sure to make and go to all appointments, and call your doctor if you are having problems. It's also a good idea to know your test results and keep a list of the medicines you take. Where can you learn more? Go to http://angie-dayanara.info/. Paulo Starks in the search box to learn more about \"Plantar Fasciitis: Exercises. \"  Current as of: March 21, 2017  Content Version: 11.4  © 2167-4660 [employer]. Care instructions adapted under license by [employer] (which disclaims liability or warranty for this information). If you have questions about a medical condition or this instruction, always ask your healthcare professional. [place] [address] any warranty or liability for your use of this information.

## 2018-03-26 NOTE — PROGRESS NOTES
AMBULATORY PROGRESS NOTE      Patient: Nilsa Finney             MRN: [id number]     SSN: [id number] Body mass index is 38.35 kg/(m^2). YOB: 1940     AGE: 76 y.o. EX: female    PCP: Clive Pelletier MD       IMPRESSION/DIAGNOSIS AND TREATMENT PLAN      DIAGNOSES    1. Posterior tibial tendon dysfunction (PTTD) of right lower extremity    2. Metatarsalgia of right foot    3. Lumbar radiculopathy, right        Orders Placed This Encounter    REFERRAL TO [facility] understands her diagnoses and the proposed plan. PLAN //  HPI AND EXAMINATION      Nilsa Finney IS A 76 y.o. female who presents to my outpatient office for evaluation of: In this individual who has been seen here for posterior tendinitis, recommendation is to continue her hinged AFO-type brace. We will reassess her in about four to six weeks. For her back, she is having complaints of low back pain to the right side, what sounds like radicular pain to her right lower extremity. She has had an MRI of her lumbar spine. She requests to see Dr. Corrina Sutherland. I will have my office call to see whether the patient can be seen by Dr. Latanya De Anda for her back, as this is what she has requested. Her chief complaint in her back has been low back pain with shooting pain down her right lower extremity. Although she had seen Dr. Maxime Rankin, and he ordered an MRI of the lumbar spine with the indications being low back pain without sciatica but unspecified back pain with laterality with indications also for bilateral lower extremity pain and numbness. Her MRI of the lumbar spine showed multilevel, discogenic, degenerative changes, moderate to advanced degenerative changes at the facets, and no high-grade stenosis or neural compression was noted on the MRI, for which this study was actually done on March 6, 2018. From my standpoint, I want to see her again in about four to six weeks. For any worsening of her baseline condition, of course we will see her sooner. Visit Vitals    /76    Pulse 72    Temp 95.9 °F (35.5 °C)    Ht 5' 6\" (1.676 m)    Wt 237 lb 9.6 oz (107.8 kg)    SpO2 95%    BMI 38.35 kg/m2        [facility] A[facility] Berg is alert/oriented (name, location, time) and follows commands well. she  is in no acute distress and her affect and mood are appropriate.        Left ANKLE and FOOT     Gait: slow and uses assistive device   Tenderness: mild tenderness of the left foot. Cutaneous: No rashes, skin patches, wounds, or abrasions to the lower legs                                Warm and Normal color. No regions of expressible drainage.                                Medial Border of Tibia Region: mild                                Skin color, texture, turgor normal. Normal.  Joint Motion: ROM Ankle: Normal , Hindfoot: (ST,TN,CC) Normal, Forefoot toes: Normal  Neurologic Exam: Neuro: Motor: normal 5/5 strength in all tested muscle groups and Sensory: no sensory deficits noted. Contractures: Gastrocnemius or Achilles Contractures absent  Joint / Tendon Stability: No Ankle or Subtalar instability or joint laxity.                                                               No peroneal sublux ability or dislocation  Alignment: Pes Planus Alignment and  Flexible  Vascular: Normal Pulses/ NL Capillary refill, No evidence of DVT seen on physical exam.                        No calf swelling, no tenderness to calf muscles. Lymphatic:  No Evidence of Lymphedema. CHART REVIEW      Past Medical History:   Diagnosis Date    Arthritis     Arthropathy, unspecified, site unspecified     Bruising     bruising and bleeding    Diverticulitis     Diverticulosis     Essential hypertension     Fibromyalgia     GERD (gastroesophageal reflux disease)     Hearing loss     Obesity (BMI 30-39. 9)     Postoperative respiratory failure ([address] 9/30/10    Pre-diabetes     11/16 diet controlled    Renal cell cancer ([facility]     Stage T1NxMx    Respiratory failure, post-operative [facility]     Wears glasses      Current Outpatient Prescriptions   Medication Sig    montelukast (SINGULAIR) 10 mg tablet     fluticasone (FLONASE) 50 mcg/actuation nasal spray     dexamethasone (DECADRON) 0.1 % ophthalmic solution     DULoxetine (CYMBALTA) 20 mg capsule Take 1 Cap by mouth daily.  metoprolol succinate (TOPROL-XL) 200 mg XL tablet Take 1 Tab by mouth daily.  doxazosin (CARDURA) 4 mg tablet Take 1 Tab by mouth nightly.  DULoxetine (CYMBALTA) 20 mg capsule Take 1 Cap by mouth daily.  acetaminophen (TYLENOL EXTRA STRENGTH) 500 mg tablet Take  by mouth every six (6) hours as needed for Pain.  cholecalciferol, vitamin D3, (VITAMIN D3) 2,000 unit tab Take  by mouth.  MULTIVIT-MIN/FOLIC ACID/VIT K1 (MULTI FOR HER 50 PLUS PO) Take  by mouth.  fish oil-dha-epa 1,200-144-216 mg cap Take  by mouth.  lidocaine-prilocaine (EMLA) topical cream     ACCU-CHEK SOFTCLIX LANCETS misc     ACCU-CHEK TAMY PLUS TEST STRP strip     loratadine (CLARITIN) 10 mg tablet Take 10 mg by mouth.  aluminum hydrox-magnesium carb (GAVISCON)  mg/15 mL suspension Take 15 mL by mouth every six (6) hours as needed for Indigestion.  triamcinolone-emollient cmb#86 0.1 % crea 1 Applicator by Apply Externally route daily as needed.  omega-3 fatty acids-vitamin e (FISH OIL) 1,000 mg Cap Take 1 Cap by mouth.  omeprazole (PRILOSEC) 20 mg capsule Take 40 mg by mouth two (2) times a day.  amLODIPine (NORVASC) 10 mg tablet Take  by mouth daily.  calcium carbonate (OS-WATSON) 500 mg calcium (1,250 mg) tablet Take 500 mg by mouth daily.  Hydrochlorothiazide 12.5 mg tablet Take 12.5 mg by mouth every other day. No current facility-administered medications for this visit.       Allergies   Allergen Reactions    Aspirin Other (comments)    Codeine Other (comments)     Gi distress      Darvon [Propoxyphene] Rash and Itching     Gi distress    Dextromethorphan Other (comments)     Stomach cramps    Meperidine Hives     Gi distress    Morphine Other (comments)     Neurological reaction    Other Medication Palpitations     Muscle relaxants    Pcn [Penicillins] Swelling    Propoxyphene N-Acetaminophen Hives    Sulfa (Sulfonamide Antibiotics) Hives     Past Surgical History:   Procedure Laterality Date    HX BREAST BIOPSY      HX BREAST REDUCTION      HX CATARACT REMOVAL      HX CHOLECYSTECTOMY      HX HERNIA REPAIR      HX HYSTERECTOMY  1984     total vaginal    HX OTHER SURGICAL  9/2010    SIGMOIDECTOMY    HX VEIN STRIPPING       Social History     Occupational History    Not on file. Social History Main Topics    Smoking status: Never Smoker    Smokeless tobacco: Never Used    Alcohol use No    Drug use: No    Sexual activity: Not on file     Family History   Problem Relation Age of Onset    Heart Attack Mother 79    Diabetes Other     Hypertension Other     Arthritis-osteo Other     Heart Disease Other     Heart Attack Sister 71        REVIEW OF SYSTEMS : 3/26/2018  ALL BELOW ARE Negative except : SEE HPI      Constitutional: Negative for fever, chills and weight loss. Neg Weight Loss  Cardiovascular: Negative for chest pain, claudication and leg swelling. SOB, BAIG   Gastrointestinal/Urological: Negative for pain, N/V/D/C, Blood in stool or urine,dysuria,  Hematuria, Incontinence  Endocrine: See HPI  Skin: see HPI  Musculoskeletal: see HPI. Neurological: Negative for dizziness and weakness, headaches,Visual Changes or   Confusion, or Seizures,   Psychiatric/Behavioral: Negative for depression, memory loss and substance abuse. Extremities:  Negative for hair changes, rash or skin lesion changes. Hematologic: Negative for Bleeding problems, bruising, pallor or swollen lymph nodes.   Peripheral Vascular: No calf pain, vascular vein tenderness to calf pain              No calf throbbing, posterior knee throbbing pain     DIAGNOSTIC IMAGING       No notes on file     Please see above section of this report. I have personally reviewed the results of the above study. The interpretation of this study is my professional opinion.       Lauren Aguillon MD  3/26/2018  12:50 PM

## 2018-03-26 NOTE — MR AVS SNAPSHOT
[address], [address] [facility] 
[phone] Patient: [facility] MRN: [id number] OJM:[id number]/84/[id number] Visit Information Date & Time Provider Department Dept. Phone Encounter #  
 3/26/2018 11:10 AM aMrialuisa Evans MD [facility] [id number] Follow-up Instructions Return in about 1 month (around 4/26/2018). Your Appointments 4/2/2018 10:20 AM  
Follow Up with Marialuisa Evans MD  
[facility] ([facility]) Appt Note: LT FOOT 2 mo fu  
 [address], [address] [facility]  
[phone] [address] [facility] 5/14/2018  9:00 AM  
ULTRASOUND with [facility] [place] [facility] [address] ([facility]) Appt Note: rcc/disha/mdf  
 [address] [facility] [address] [facility] [address]  
[phone]  
  
   
 [place] [id number] 5/14/2018  9:30 AM  
ESTABLISHED PATIENT with Tasha Ortiz MD  
[facility]) Appt Note: rcc  
 [address] [facility], [address] [facility] [address]  
  
   
 301 [facility], [address] [place] [address] Upcoming Health Maintenance Date Due DTaP/Tdap/Td series (1 - Tdap) 9/10/1959 ZOSTER VACCINE AGE 60> 7/10/1998 GLAUCOMA SCREENING Q2Y 9/10/2003 Pneumococcal 65+ High/Highest Risk (1 of 2 - PCV13) 9/10/2003 Influenza Age 5 to Adult 8/1/2017 MEDICARE YEARLY EXAM 3/14/2018 Allergies as of 3/26/2018  Review Complete On: 2/26/2018 By: Devon Jasso MD  
  
 Severity Noted Reaction Type Reactions Aspirin  03/26/2012    Other (comments) Codeine  03/26/2012    Other (comments) Gi distress Darvon [Propoxyphene]  03/26/2012    Rash, Itching Gi distress Dextromethorphan  04/24/2013    Other (comments) Stomach cramps Meperidine  03/26/2012    Hives Gi distress Morphine  03/26/2012    Other (comments) Neurological reaction Other Medication  10/24/2016    Palpitations Muscle relaxants Pcn [Penicillins]  03/26/2012    Swelling Propoxyphene N-acetaminophen  03/26/2012    Hives Sulfa (Sulfonamide Antibiotics)  03/26/2012    Hives Current Immunizations  Never Reviewed No immunizations on file. Not reviewed this visit You Were Diagnosed With   
  
 Codes Comments Posterior tibial tendon dysfunction (PTTD) of right lower extremity    -  Primary ICD-10-CM: M21.41 
ICD-9-CM: 857 Metatarsalgia of right foot     ICD-10-CM: M77.41 
ICD-9-CM: 726.70 Lumbar radiculopathy, right     ICD-10-CM: M54.16 
ICD-9-CM: 724.4 Vitals BP Pulse Temp Height(growth percentile) Weight(growth percentile) SpO2  
 165/76 72 95.9 °F (35.5 °C) 5' 6\" (1.676 m) 237 lb 9.6 oz (107.8 kg) 95% BMI OB Status Smoking Status 38.35 kg/m2 Hysterectomy Never Smoker BMI and BSA Data Body Mass Index Body Surface Area  
 38.35 kg/m 2 2.24 m 2 Preferred Pharmacy Pharmacy Name Phone [id number] [address] [phone] Your Updated Medication List  
  
   
This list is accurate as of 3/26/18 12:22 PM.  Always use your most recent med list.  
  
  
  
  
 ACCU-CHEK TAMY PLUS TEST STRP strip Generic drug:  glucose blood VI test strips [address] [place] [address] Generic drug:  Lancets  
  
 aluminum hydrox-magnesium carb  mg/15 mL suspension Commonly known as:  GAVISCON Take 15 mL by mouth every six (6) hours as needed for Indigestion. calcium carbonate 500 mg calcium (1,250 mg) tablet Commonly known as:  OS-WATSON Take 500 mg by mouth daily. dexamethasone 0.1 % ophthalmic solution Commonly known as:  DECADRON  
  
 doxazosin 4 mg tablet Commonly known as:  CARDURA Take 1 Tab by mouth nightly. * DULoxetine 20 mg capsule Commonly known as:  CYMBALTA Take 1 Cap by mouth daily. * DULoxetine 20 mg capsule Commonly known as:  CYMBALTA Take 1 Cap by mouth daily. FISH OIL 1,000 mg Cap Generic drug:  omega-3 fatty acids-vitamin e Take 1 Cap by mouth. fish oil-dha-epa 1,200-144-216 mg Cap Take  by mouth. fluticasone 50 mcg/actuation nasal spray Commonly known as:  FLONASE  
  
 hydroCHLOROthiazide 12.5 mg tablet Commonly known as:  HYDRODIURIL Take 12.5 mg by mouth every other day. lidocaine-prilocaine topical cream  
Commonly known as:  EMLA  
  
 loratadine 10 mg tablet Commonly known as:  Advance Last Take 10 mg by mouth.  
  
 metoprolol succinate 200 mg XL tablet Commonly known as:  TOPROL-XL Take 1 Tab by mouth daily. montelukast 10 mg tablet Commonly known as:  SINGULAIR  
  
 MULTI FOR HER 50 PLUS PO Take  by mouth. NORVASC 10 mg tablet Generic drug:  amLODIPine Take  by mouth daily. PriLOSEC 20 mg capsule Generic drug:  omeprazole Take 40 mg by mouth two (2) times a day. triamcinolone-emollient comb86 0.1 % Crea 1 Applicator by Apply Externally route daily as needed. TYLENOL EXTRA STRENGTH 500 mg tablet Generic drug:  acetaminophen Take  by mouth every six (6) hours as needed for Pain. VITAMIN D3 2,000 unit Tab Generic drug:  cholecalciferol (vitamin D3) Take  by mouth. * Notice: This list has 2 medication(s) that are the same as other medications prescribed for you. Read the directions carefully, and ask your doctor or other care provider to review them with you. We Performed the Following REFERRAL TO SPINE SURGERY [ZNH426 Custom] Comments:  
 Patient requesting Dr. Priyanka Colin MRI in [facility] Follow-up Instructions Return in about 1 month (around 4/26/2018). Referral Information Referral ID Referred By Referred To  
  
 [id number] Cliff YOON MD [address] Phone: [phone] Fax: [phone] Visits Status Start Date End Date 1 New Request 3/26/18 3/26/19 If your referral has a status of pending review or denied, additional information will be sent to support the outcome of this decision. Patient Instructions Continue to wear your brace POSTERIOR TIBIAL TENDON DYSFUNCTION 
discussed possible hind foot fusion (last option) Follow up with Dr. Jan Khan as scheduled Call if symptoms worsen Continue activities as tolerated in the brace Stretching exercises (demonstrated and given) Exercises Your Care Instructions Here are some examples of typical rehabilitation exercises for your condition. Start each exercise slowly. Ease off the exercise if you start to have pain. Your doctor or physical therapist will tell you when you can start these exercises and which ones will work best for you. How to do the exercises Towel stretch 1. Sit with your legs extended and knees straight. 2. Place a towel around your foot just under the toes. 3. Hold each end of the towel in each hand, with your hands above your knees. 4. Pull back with the towel so that your foot stretches toward you. 5. Hold the position for at least 15 to 30 seconds. 6. Repeat 2 to 4 times a session, up to 5 sessions a day. Calf stretch This exercise stretches the muscles at the back of the lower leg (the calf) and the Achilles tendon. Do this exercise 3 or 4 times a day, 5 days a week. 1. Stand facing a wall with your hands on the wall at about eye level. Put the leg you want to stretch about a step behind your other leg. 2. Keeping your back heel on the floor, bend your front knee until you feel a stretch in the back leg. 3. Hold the stretch for 15 to 30 seconds. Repeat 2 to 4 times. Plantar fascia and calf stretch Stretching the plantar fascia and calf muscles can increase flexibility and decrease heel pain. You can do this exercise several times each day and before and after activity. 1. Stand on a step as shown above. Be sure to hold on to the banister. 2. Slowly let your heels down over the edge of the step as you relax your calf muscles. You should feel a gentle stretch across the bottom of your foot and up the back of your leg to your knee. 3. Hold the stretch about 15 to 30 seconds, and then tighten your calf muscle a little to bring your heel back up to the level of the step. Repeat 2 to 4 times. Towel curls Make this exercise more challenging by placing a weighted object, such as a soup can, on the other end of the towel. 1. While sitting, place your foot on a towel on the floor and scrunch the towel toward you with your toes. 2. Then, also using your toes, push the towel away from you. Dillon pickups 1. Put marbles on the floor next to a cup. 
2. Using your toes, try to lift the marbles up from the floor and put them in the cup. Follow-up care is a key part of your treatment and safety. Be sure to make and go to all appointments, and call your doctor if you are having problems. It's also a good idea to know your test results and keep a list of the medicines you take. Where can you learn more? Go to http://angie-dayanara.info/. Betty Burnham in the search box to learn more about \"Plantar Fasciitis: Exercises. \" Current as of: March 21, 2017 Content Version: 11.4 © 4948-8227 [employer]. Care instructions adapted under license by [employer] (which disclaims liability or warranty for this information).  If you have questions about a medical condition or this instruction, always ask your healthcare professional. [employer] disclaims any warranty or liability for your use of this information. Introducing [facility] HE[facility]! Tammy Delgado introduces [employer] patient portal. Now you can access parts of your medical record, email your doctor's office, and request medication refills online. 1. In your internet browser, go to [email or web address] 2. Click on the First Time User? Click Here link in the Sign In box. You will see the New Member Sign Up page. 3. Enter your [employer] Access Code exactly as it appears below. You will not need to use this code after youve completed the sign-up process. If you do not sign up before the expiration date, you must request a new code. · [employer] Access Code: [email or web address] Expires: 6/24/2018 12:22 PM 
 
4. Enter the last four digits of your Social Security Number (xxxx) and Date of Birth (mm/dd/yyyy) as indicated and click Submit. You will be taken to the next sign-up page. 5. Create a [employer] ID. This will be your [employer] login ID and cannot be changed, so think of one that is secure and easy to remember. 6. Create a [employer] password. You can change your password at any time. 7. Enter your Password Reset Question and Answer. This can be used at a later time if you forget your password. 8. Enter your e-mail address. You will receive e-mail notification when new information is available in [address] 9. Click Sign Up. You can now view and download portions of your medical record. 10. Click the Download Summary menu link to download a portable copy of your medical information. If you have questions, please visit the Frequently Asked Questions section of the [employer] website. Remember, [employer] is NOT to be used for urgent needs. For medical emergencies, dial 911. Now available from your iPhone and Android! Please provide this summary of care documentation to your next provider. Your primary care clinician is listed as Frank Raw. If you have any questions after today's visit, please call [phone].

## 2018-04-12 ENCOUNTER — OFFICE VISIT (OUTPATIENT)
Dept: ORTHOPEDIC SURGERY | Age: 78
End: 2018-04-12

## 2018-04-12 VITALS
SYSTOLIC BLOOD PRESSURE: 154 MMHG | HEART RATE: 68 BPM | BODY MASS INDEX: 37.93 KG/M2 | HEIGHT: 66 IN | WEIGHT: 236 LBS | OXYGEN SATURATION: 98 % | DIASTOLIC BLOOD PRESSURE: 83 MMHG | TEMPERATURE: 97.1 F | RESPIRATION RATE: 17 BRPM

## 2018-04-12 DIAGNOSIS — M79.7 FIBROMYALGIA: ICD-10-CM

## 2018-04-12 DIAGNOSIS — M51.36 DDD (DEGENERATIVE DISC DISEASE), LUMBAR: Primary | ICD-10-CM

## 2018-04-12 DIAGNOSIS — G60.8 IDIOPATHIC SENSORIMOTOR AXONAL NEUROPATHY: ICD-10-CM

## 2018-04-12 RX ORDER — LORATADINE 10 MG/1
10 TABLET ORAL
COMMUNITY
End: 2020-06-22

## 2018-04-12 RX ORDER — OMEPRAZOLE 40 MG/1
40 CAPSULE, DELAYED RELEASE ORAL DAILY
COMMUNITY
End: 2020-10-26

## 2018-04-12 RX ORDER — TRAMADOL HYDROCHLORIDE AND ACETAMINOPHEN 37.5; 325 MG/1; MG/1
TABLET ORAL
COMMUNITY
Start: 2018-03-09 | End: 2020-06-22

## 2018-04-12 NOTE — PROGRESS NOTES
[address]  [address], [address]  [place]  Phone: [phone]  Fax: [phone]        Kg Walker  : 1940  PCP: Johan Pierre MD    PROGRESS NOTE      ASSESSMENT AND PLAN    Diagnoses and all orders for this visit:    1. DDD (degenerative disc disease), lumbar  -     REFERRAL TO PHYSICAL THERAPY    2. Fibromyalgia  -     Generic Supply Order    3. 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 Advised to continue HEP. 2. Referral to physical therapy, aqua therapy. 3. May call and receive DME for TENS unit. 4. Recommend starting Cymbalta qDinner. 5. Given home exercises. Follow-up Disposition:  Return in about 6 weeks (around 2018). HISTORY OF PRESENT ILLNESS  Giovany Dominguez is a 76 y.o. female. Pt presents to the office for a f/u visit for back pain. This is a pt of Dr. Black Morrison, has a Dx of fibromyalgia and chronic ankle pain. She also consults with Dr. Carolyn Gavin. She had an EMG that was +predominantly sensory axonal peripheral neuropathy. She had a neuropathy work-up by Dr. Lacey Francis, pt declined medications. She has been seen by [facility] but does not wish to follow-up there. This is my first evaluation of this pt. Last visit pt was sent to have a lumbar spine MRI. Images reviewed with the pt. Pt states that she wishes to have a second opinion for her back. Her pain starts in her low back. Pt reports pain does radiate into B/L buttocks and into the legs, R>L. She feels tiredness in her legs. She states that she is having more issues with standing and walking. She is unable to stand to do the dishes. She has been sleeping on a recliner, believes this is causing her more back pain. She has increased pain in the morning, is doing stretches before rising. She states that this pain starts to relieve after moving around. She has paresthesias in her RLE, has neuropathy B/L.   Pt has weakness in her RLE with a foot drop. Pt wearing R AFO in the office today. She has a +shopping cart sign. She states that her pain has become so severe that she now requires a scooter when shopping in a grocery store. Pt denies saddle paresthesias. Pt states she was given a trial of Cymbalta which she never took as she was afraid she would have a side effect. She takes Tylenol extra strength without relief. She was given Tramadol for a spider bite but never took it. She does not wish to try any medications as she is concerned with side effects. Denies persistent fevers, chills, weight changes, neurogenic bowel or bladder symptoms. Pt denies recent ED visits or hospitalizations.  reviewed. Hx of renal cell carcinoma. Has tried physical therapy, has not tried aqua therapy. She tried TENS unit with relief. She states that physical therapy really helps her. Pain Assessment  4/12/2018   Location of Pain Buttocks;Back;Hip;Leg;Other (comment); Foot   Pain Location Comment upper thigh hurts the most   Location Modifiers Left;Right   Severity of Pain 8   Quality of Pain Aching   Quality of Pain Comment -   Duration of Pain Persistent   Frequency of Pain Constant   Date Pain First Started -   Aggravating Factors Standing;Bending;Stretching;Straightening;Exercise;Kneeling;Squatting; Walking;Stairs; Other (Comment)   Aggravating Factors Comment standing and walking is the worse, getting out of bed   Limiting Behavior -   Relieving Factors Nothing   Relieving Factors Comment -   Result of Injury No   Work-Related Injury -   Type of Injury -   Type of Injury Comment -             MRI Results (most recent):    Results from Hospital Encounter encounter on 03/06/18   MRI LUMB SPINE WO CONT   Narrative EXAM:  MRI LUMB SPINE WO CONT    INDICATION:   Low back pain without sciatica unspecified back pain laterality,  bilateral lower extremity pain and numbness    COMPARISON: None    TECHNIQUE:   MR imaging of the lumbar spine was performed with sagittal T1, T2, STIR;  axial  T1, T2. Contrast was not administered. FINDINGS:  There is normal alignment of the lumbar spine. Vertebral body heights are  maintained. No suspicious bone marrow lesion or infiltrative bone marrow  process. 6 mm likely osseous hemangioma L1 inferior endplate. Disc space  heights are maintained. The conus medullaris terminates at L1/L2 level. Lower thoracic levels T10-L1 demonstrate no significant disc pathology with  patent canal and foramina. Correlation of sagittal and axial images at the level of the discs demonstrate  the following:    L1/2:  No significant disc pathology. Mild facet and ligamentum hypertrophy. No  central canal or foraminal stenosis. L2/3:  Mild disc bulge. Moderate facet and ligamentum hypertrophy. No central  canal or foraminal stenosis. L3/4:  Mild disc bulge. Moderate facet and ligamentum hypertrophy. No central  canal or foraminal stenosis. L4/5:  Mild disc bulge. Moderate facet hypertrophy. No central canal or  foraminal stenosis. L5/S1:  No disc bulge or herniation. Severe facet hypertrophy. No central canal  or foraminal stenosis. No incidental abnormality in the partially imaged retroperitoneal structures. Impression IMPRESSION:      Multilevel degenerative changes, primarily moderate to advanced degenerative  facet arthropathy. No high-grade stenosis or neural compression. 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 9)     Postoperative respiratory failure ([address] 9/30/10    Pre-diabetes     11/16 diet controlled    Renal cell cancer ([address]     Stage T1NxMx    Respiratory failure, post-operative ([address]     Wears glasses        Past Surgical History:   Procedure Laterality Date    HX BREAST BIOPSY      HX BREAST REDUCTION      HX CATARACT REMOVAL      HX CHOLECYSTECTOMY      HX HERNIA REPAIR      HX HYSTERECTOMY  1984     total vaginal    HX OTHER SURGICAL  9/2010    SIGMOIDECTOMY    HX VEIN STRIPPING     . MEDICATIONS      Current Outpatient Prescriptions   Medication Sig Dispense Refill    loratadine (CLARITIN) 10 mg tablet Take 10 mg by mouth.  omeprazole (PRILOSEC) 40 mg capsule Take 40 mg by mouth daily.  fluticasone (FLONASE) 50 mcg/actuation nasal spray       metoprolol succinate (TOPROL-XL) 200 mg XL tablet Take 1 Tab by mouth daily. 90 Tab 2    doxazosin (CARDURA) 4 mg tablet Take 1 Tab by mouth nightly. 90 Tab 2    acetaminophen (TYLENOL EXTRA STRENGTH) 500 mg tablet Take  by mouth every six (6) hours as needed for Pain.  ACCU-CHEK SOFTCLIX LANCETS misc       ACCU-CHEK TAMY PLUS TEST STRP strip       aluminum hydrox-magnesium carb (GAVISCON)  mg/15 mL suspension Take 15 mL by mouth every six (6) hours as needed for Indigestion.  triamcinolone-emollient cmb#86 0.1 % crea 1 Applicator by Apply Externally route daily as needed.  amLODIPine (NORVASC) 10 mg tablet Take  by mouth daily.  Hydrochlorothiazide 12.5 mg tablet Take 12.5 mg by mouth every other day.  traMADol-acetaminophen (ULTRACET) 37.5-325 mg per tablet Take 1 Tab by Mouth Every 6 Hours As Needed for Pain. No more than 8 tabs a day      DULoxetine (CYMBALTA) 20 mg capsule Take 1 Cap by mouth daily.  (Patient taking differently: Take  by mouth daily.) 30 Cap 1        ALLERGIES    Allergies   Allergen Reactions    Aspirin Other (comments)    Codeine Other (comments)     Gi distress      Darvon [Propoxyphene] Rash and Itching     Gi distress    Dextromethorphan Other (comments)     Stomach cramps    Meperidine Hives     Gi distress    Morphine Other (comments)     Neurological reaction    Other Medication Palpitations     Muscle relaxants    Pcn [Penicillins] Swelling    Propoxyphene N-Acetaminophen Hives    Sulfa (Sulfonamide Antibiotics) Hives          SOCIAL HISTORY    Social History     Social History    Marital status:      Spouse name: N/A    Number of children: N/A    Years of education: N/A     Occupational History    Not on file. Social History Main Topics    Smoking status: Never Smoker    Smokeless tobacco: Never Used    Alcohol use No    Drug use: No    Sexual activity: Not on file     Other Topics Concern    Not on file     Social History Narrative       FAMILY HISTORY    Family History   Problem Relation Age of Onset    Heart Attack Mother 79    Diabetes Other     Hypertension Other     Arthritis-osteo Other     Heart Disease Other     Heart Attack Sister 71       REVIEW OF SYSTEMS  Review of Systems   Constitutional: Negative for chills, fever and weight loss. Respiratory: Negative for shortness of breath. Cardiovascular: Negative for chest pain. Gastrointestinal: Negative for constipation. Negative for fecal incontinence   Genitourinary: Negative for dysuria. Negative for urinary incontinence   Musculoskeletal:        Per HPI   Skin: Negative for rash. Neurological: Positive for tingling and focal weakness. Negative for dizziness, tremors and headaches. Endo/Heme/Allergies: Does not bruise/bleed easily. Psychiatric/Behavioral: The patient does not have insomnia. PHYSICAL EXAMINATION  Visit Vitals    /83    Pulse 68    Temp 97.1 °F (36.2 °C) (Oral)    Resp 17    Ht 5' 6\" (1.676 m)    Wt 236 lb (107 kg)    SpO2 98%    BMI 38.09 kg/m2         Accompanied by family member. Constitutional:  Well developed, well nourished, in no acute distress. Psychiatric: Affect and mood are appropriate.    Integumentary: No rashes or abrasions noted on exposed areas. Cardiovascular/Peripheral Vascular: Intact l pulses. 1+ nonpitting edema BLE. Lymphatic:  No evidence of lymphedema. No cervical lymphadenopathy. SPINE/MUSCULOSKELETAL EXAM    Thoracic spine:  Elevation of R shoulder. Kyphotic. Lumbar spine:  No rash, ecchymosis, or gross obliquity. No fasciculations. No focal atrophy is noted. Tenderness to palpation midline L4-5. Tenderness to palpation of L L3-4. Tenderness to palpation of B/L SI joints. Tenderness to palpation of B/L trochanteric bursa. No tenderness to palpation at the sciatic notch. MOTOR:       Hip Flex  Quads Hamstrings Ankle DF EHL Ankle PF   Right +4/5 +4/5 +4/5 AFO AFO AFO   Left +4/5 +4/5 +4/5 +4/5 +4/5 +4/5         Straight Leg raise negative. - MOOK maneuver. No pain with hip ROM. Ambulation with single point cane. FWB. Written by Clara Mccarty, as dictated by Kaylene Ac MD.    I, Dr. Kaylene Ac MD, confirm that all documentation is accurate. Ms. Jose Acosta may have a reminder for a \"due or due soon\" health maintenance. I have asked that she contact her primary care provider for follow-up on this health maintenance.

## 2018-04-12 NOTE — PATIENT INSTRUCTIONS
Back Stretches: Exercises  Your Care Instructions  Here are some examples of exercises for stretching your back. Start each exercise slowly. Ease off the exercise if you start to have pain. Your doctor or physical therapist will tell you when you can start these exercises and which ones will work best for you. How to do the exercises  Overhead stretch    1. Stand comfortably with your feet shoulder-width apart. 2. Looking straight ahead, raise both arms over your head and reach toward the ceiling. Do not allow your head to tilt back. 3. Hold for 15 to 30 seconds, then lower your arms to your sides. 4. Repeat 2 to 4 times. Side stretch    1. Stand comfortably with your feet shoulder-width apart. 2. Raise one arm over your head, and then lean to the other side. 3. Slide your hand down your leg as you let the weight of your arm gently stretch your side muscles. Hold for 15 to 30 seconds. 4. Repeat 2 to 4 times on each side. Press-up    1. Lie on your stomach, supporting your body with your forearms. 2. Press your elbows down into the floor to raise your upper back. As you do this, relax your stomach muscles and allow your back to arch without using your back muscles. As your press up, do not let your hips or pelvis come off the floor. 3. Hold for 15 to 30 seconds, then relax. 4. Repeat 2 to 4 times. Relax and rest    1. Lie on your back with a rolled towel under your neck and a pillow under your knees. Extend your arms comfortably to your sides. 2. Relax and breathe normally. 3. Remain in this position for about 10 minutes. 4. If you can, do this 2 or 3 times each day. Follow-up care is a key part of your treatment and safety. Be sure to make and go to all appointments, and call your doctor if you are having problems. It's also a good idea to know your test results and keep a list of the medicines you take. Where can you learn more? Go to http://angie-dayanara.info/.   Enter W885 in the search box to learn more about \"Back Stretches: Exercises. \"  Current as of: March 21, 2017  Content Version: 11.4  © 8037-5874 [email or web address]. Care instructions adapted under license by [employer] (which disclaims liability or warranty for this information). If you have questions about a medical condition or this instruction, always ask your healthcare professional. [address] any warranty or liability for your use of this information. Low Back Pain: Exercises  Your Care Instructions  Here are some examples of typical rehabilitation exercises for your condition. Start each exercise slowly. Ease off the exercise if you start to have pain. Your doctor or physical therapist will tell you when you can start these exercises and which ones will work best for you. How to do the exercises  Press-up    5. Lie on your stomach, supporting your body with your forearms. 6. Press your elbows down into the floor to raise your upper back. As you do this, relax your stomach muscles and allow your back to arch without using your back muscles. As your press up, do not let your hips or pelvis come off the floor. 7. Hold for 15 to 30 seconds, then relax. 8. Repeat 2 to 4 times. Alternate arm and leg (bird dog) exercise    Do this exercise slowly. Try to keep your body straight at all times, and do not let one hip drop lower than the other. 5. Start on the floor, on your hands and knees. 6. Tighten your belly muscles. 7. Raise one leg off the floor, and hold it straight out behind you. Be careful not to let your hip drop down, because that will twist your trunk. 8. Hold for about 6 seconds, then lower your leg and switch to the other leg. 9. Repeat 8 to 12 times on each leg. 10. Over time, work up to holding for 10 to 30 seconds each time.   11. If you feel stable and secure with your leg raised, try raising the opposite arm straight out in front of you at the same time.  Knee-to-chest exercise    5. Lie on your back with your knees bent and your feet flat on the floor. 6. Bring one knee to your chest, keeping the other foot flat on the floor (or keeping the other leg straight, whichever feels better on your lower back). 7. Keep your lower back pressed to the floor. Hold for at least 15 to 30 seconds. 8. Relax, and lower the knee to the starting position. 9. Repeat with the other leg. Repeat 2 to 4 times with each leg. 10. To get more stretch, put your other leg flat on the floor while pulling your knee to your chest.  Curl-ups    5. Lie on the floor on your back with your knees bent at a 90-degree angle. Your feet should be flat on the floor, about 12 inches from your buttocks. 6. Cross your arms over your chest. If this bothers your neck, try putting your hands behind your neck (not your head), with your elbows spread apart. 7. Slowly tighten your belly muscles and raise your shoulder blades off the floor. 8. Keep your head in line with your body, and do not press your chin to your chest.  9. Hold this position for 1 or 2 seconds, then slowly lower yourself back down to the floor. 10. Repeat 8 to 12 times. Pelvic tilt exercise    1. Lie on your back with your knees bent. 2. \"Brace\" your stomach. This means to tighten your muscles by pulling in and imagining your belly button moving toward your spine. You should feel like your back is pressing to the floor and your hips and pelvis are rocking back. 3. Hold for about 6 seconds while you breathe smoothly. 4. Repeat 8 to 12 times. Heel dig bridging    1. Lie on your back with both knees bent and your ankles bent so that only your heels are digging into the floor. Your knees should be bent about 90 degrees. 2. Then push your heels into the floor, squeeze your buttocks, and lift your hips off the floor until your shoulders, hips, and knees are all in a straight line.   3. Hold for about 6 seconds as you continue to breathe normally, and then slowly lower your hips back down to the floor and rest for up to 10 seconds. 4. Do 8 to 12 repetitions. Hamstring stretch in doorway    1. Lie on your back in a doorway, with one leg through the open door. 2. Slide your leg up the wall to straighten your knee. You should feel a gentle stretch down the back of your leg. 3. Hold the stretch for at least 15 to 30 seconds. Do not arch your back, point your toes, or bend either knee. Keep one heel touching the floor and the other heel touching the wall. 4. Repeat with your other leg. 5. Do 2 to 4 times for each leg. Hip flexor stretch    1. Kneel on the floor with one knee bent and one leg behind you. Place your forward knee over your foot. Keep your other knee touching the floor. 2. Slowly push your hips forward until you feel a stretch in the upper thigh of your rear leg. 3. Hold the stretch for at least 15 to 30 seconds. Repeat with your other leg. 4. Do 2 to 4 times on each side. Wall sit    1. Stand with your back 10 to 12 inches away from a wall. 2. Lean into the wall until your back is flat against it. 3. Slowly slide down until your knees are slightly bent, pressing your lower back into the wall. 4. Hold for about 6 seconds, then slide back up the wall. 5. Repeat 8 to 12 times. Follow-up care is a key part of your treatment and safety. Be sure to make and go to all appointments, and call your doctor if you are having problems. It's also a good idea to know your test results and keep a list of the medicines you take. Where can you learn more? Go to http://angie-dayanara.info/. Enter M560 in the search box to learn more about \"Low Back Pain: Exercises. \"  Current as of: March 21, 2017  Content Version: 11.4  © 1310-0552 [employer]. Care instructions adapted under license by [employer] (which disclaims liability or warranty for this information).  If you have questions about a medical condition or this instruction, always ask your healthcare professional. [address] any warranty or liability for your use of this information. Healthy Upper Back: Exercises  Your Care Instructions  Here are some examples of exercises for your upper back. Start each exercise slowly. Ease off the exercise if you start to have pain. Your doctor or physical therapist will tell you when you can start these exercises and which ones will work best for you. How to do the exercises  Lower neck and upper back stretch    9. Stretch your arms out in front of your body. Clasp one hand on top of your other hand. 10. Gently reach out so that you feel your shoulder blades stretching away from each other. 11. Gently bend your head forward. 12. Hold for 15 to 30 seconds. 13. Repeat 2 to 4 times. Midback stretch    If you have knee pain, do not do this exercise. 12. Kneel on the floor, and sit back on your ankles. 13. Lean forward, place your hands on the floor, and stretch your arms out in front of you. Rest your head between your arms. 14. Gently push your chest toward the floor, reaching as far in front of you as possible. 15. Hold for 15 to 30 seconds. 16. Repeat 2 to 4 times. Shoulder rolls    11. Sit comfortably with your feet shoulder-width apart. You can also do this exercise while standing. 12. Roll your shoulders up, then back, and then down in a smooth, circular motion. 13. Repeat 2 to 4 times. Wall push-up    11. Stand against a wall with your feet about 12 to 24 inches back from the wall. If you feel any pain when you do this exercise, stand closer to the wall. 12. Place your hands on the wall slightly wider apart than your shoulders, and lean forward. 13. Gently lean your body toward the wall. Then push back to your starting position. Keep the motion smooth and controlled. 14. Repeat 8 to 12 times.   Resisted shoulder blade squeeze    For this exercise, you will need elastic exercise material, such as surgical tubing or Thera-Band.  5. Sit or stand, holding the band in both hands in front of you. Keep your elbows close to your sides, bent at a 90-degree angle. Your palms should face up. 6. Squeeze your shoulder blades together, and move your arms to the outside, stretching the band. Be sure to keep your elbows at your sides while you do this. 7. Relax. 8. Repeat 8 to 12 times. Resisted rows    For this exercise, you will need elastic exercise material, such as surgical tubing or Thera-Band. 5. Put the band around a solid object, such as a bedpost, at about waist level. Hold one end of the band in each hand. 6. With your elbows at your sides and bent to 90 degrees, pull the band back to move your shoulder blades toward each other. Return to the starting position. 7. Repeat 8 to 12 times. Follow-up care is a key part of your treatment and safety. Be sure to make and go to all appointments, and call your doctor if you are having problems. It's also a good idea to know your test results and keep a list of the medicines you take. Where can you learn more? Go to http://angie-dayanara.info/. Enter S768 in the search box to learn more about \"Healthy Upper Back: Exercises. \"  Current as of: March 21, 2017  Content Version: 11.4  © 9346-8812 [employer]. Care instructions adapted under license by [employer] (which disclaims liability or warranty for this information). If you have questions about a medical condition or this instruction, always ask your healthcare professional. [address] any warranty or liability for your use of this information. Back Care and Preventing Injuries: Care Instructions  Your Care Instructions    You can hurt your back doing many everyday activities: lifting a heavy box, bending down to garden, exercising at the gym, and even getting out of bed.  But you can keep your back strong and healthy by doing some exercises. You also can follow a few tips for sitting, sleeping, and lifting to avoid hurting your back again. Talk to your doctor before you start an exercise program. Ask for help if you want to learn more about keeping your back healthy. Follow-up care is a key part of your treatment and safety. Be sure to make and go to all appointments, and call your doctor if you are having problems. It's also a good idea to know your test results and keep a list of the medicines you take. How can you care for yourself at home? · Stay at a healthy weight to avoid strain on your lower back. · Do not smoke. Smoking increases the risk of osteoporosis, which weakens the spine. If you need help quitting, talk to your doctor about stop-smoking programs and medicines. These can increase your chances of quitting for good. · Make sure you sleep in a position that maintains your back's normal curves and on a mattress that feels comfortable. Sleep on your side with a pillow between your knees, or sleep on your back with a pillow under your knees. These positions can reduce strain on your back. · When you get out of bed, lie on your side and bend both knees. Drop your feet over the edge of the bed as you push up with both arms. Scoot to the edge of the bed. Make sure your feet are in line with your rear end (buttocks), and then stand up. · If you must stand for a long time, put one foot on a stool, ledge, or box. Exercise to strengthen your back and other muscles  · Get at least 30 minutes of exercise on most days of the week. Walking is a good choice. You also may want to do other activities, such as running, swimming, cycling, or playing tennis or team sports. · Stretch your back muscles. Here are few exercises to try:  [place] on your back with your knees bent and your feet flat on the floor.  Gently pull one bent knee to your chest. Put that foot back on the floor, and then pull the other knee to your chest. Hold for 15 to 30 seconds. Repeat 2 to 4 times. ¨ Do pelvic tilts. Lie on your back with your knees bent. Tighten your stomach muscles. Pull your belly button (navel) in and up toward your ribs. You should feel like your back is pressing to the floor and your hips and pelvis are slightly lifting off the floor. Hold for 6 seconds while breathing smoothly. · Keep your core muscles strong. The muscles of your back, belly (abdomen), and buttocks support your spine. ¨ Pull in your belly, and imagine pulling your navel toward your spine. Hold this for 6 seconds, then relax. Remember to keep breathing normally as you tense your muscles. ¨ Do curl-ups. Always do them with your knees bent. Keep your low back on the floor, and curl your shoulders toward your knees using a smooth, slow motion. Keep your arms folded across your chest. If this bothers your neck, try putting your hands behind your neck (not your head), with your elbows spread apart. ¨ Lie on your back with your knees bent and your feet flat on the floor. Tighten your belly muscles, and then push with your feet and raise your buttocks up a few inches. Hold this position 6 seconds as you continue to breathe normally, then lower yourself slowly to the floor. Repeat 8 to 12 times. ¨ If you like group exercise, try Pilates or yoga. These classes have poses that strengthen the core muscles. Protect your back when you sit  · Place a small pillow, a rolled-up towel, or a lumbar roll in the curve of your back if you need extra support. · Sit in a chair that is low enough to let you place both feet flat on the floor with both knees nearly level with your hips. If your chair or desk is too high, use a foot rest to raise your knees. · When driving, keep your knees nearly level with your hips. Sit straight, and drive with both hands on the steering wheel. Your arms should be in a slightly bent position.   · Try a kneeling chair, which helps tilt your hips forward. This takes pressure off your lower back. · Try sitting on an exercise ball. It can rock from side to side, which helps keep your back loose. Lift properly  · Squat down, bending at the hips and knees only. If you need to, put one knee to the floor and extend your other knee in front of you, bent at a right angle (half kneeling). · Press your chest straight forward. This helps keep your upper back straight while keeping a slight arch in your low back. · Hold the load as close to your body as possible, at the level of your navel. · Use your feet to change direction, taking small steps. · Lead with your hips as you change direction. Keep your shoulders in line with your hips as you move. Do not twist your body. · Set down your load carefully, squatting with your knees and hips only. When should you call for help? Watch closely for changes in your health, and be sure to contact your doctor if you have any problems. Where can you learn more? Go to http://angie-dayanara.info/. Enter S810 in the search box to learn more about \"Back Care and Preventing Injuries: Care Instructions. \"  Current as of: March 21, 2017  Content Version: 11.4  © 3005-4160 [employer]. Care instructions adapted under license by [employer] (which disclaims liability or warranty for this information). If you have questions about a medical condition or this instruction, always ask your healthcare professional. [place] [address] any warranty or liability for your use of this information.

## 2018-04-12 NOTE — PROGRESS NOTES
Verbal order entered per Dr. Leyla Martel as documented on blue sheet:Referral to [facility]. DME for TENS unit supplies.

## 2018-04-12 NOTE — MR AVS SNAPSHOT
[address] [facility] [facility] [address] 
[phone] Patient: [facility] MRN: [id number] PWQ:[id number] Visit Information Date & Time Provider Department Dept. Phone Encounter #  
 4/12/2018  1:10 PM Jaime Seip,  [facility], [address] [facility] 99 [id number] Follow-up Instructions Return in about 6 weeks (around 5/24/2018). Your Appointments 4/23/2018 11:10 AM  
Follow Up with Sharon Harrison MD  
[facility] ([address]) Appt Note: LT FOOT 2 mo fu; ashwini foot 1 mo fu  
 [facility], [address] [facility]  
[phone] [address] [facility], [address] 5/14/2018  9:00 AM  
ULTRASOUND with [facility] [place] [address] ([address]) Appt Note: rcc/disha/mdf  
 [address] [facility] [address] [facility] [address]  
[phone]  
  
   
 [place] [id number] 5/14/2018  9:30 AM  
ESTABLISHED PATIENT with Zahra Santana MD  
[facility] [address]) Appt Note: rcc  
 [address], [address]  
  
   
 [address] [facility], [address] [place] [address] Upcoming Health Maintenance Date Due DTaP/Tdap/Td series (1 - Tdap) 9/10/1959 ZOSTER VACCINE AGE 60> 7/10/1998 GLAUCOMA SCREENING Q2Y 9/10/2003 Pneumococcal 65+ High/Highest Risk (1 of 2 - PCV13) 9/10/2003 Influenza Age 5 to Adult 8/1/2017 MEDICARE YEARLY EXAM 3/14/2018 Allergies as of 4/12/2018  Review Complete On: 4/12/2018 By: Corrinne Spear Severity Noted Reaction Type Reactions Aspirin  03/26/2012    Other (comments) Codeine  03/26/2012    Other (comments) Gi distress Darvon [Propoxyphene]  03/26/2012    Rash, Itching Gi distress Dextromethorphan  04/24/2013    Other (comments) Stomach cramps Meperidine  03/26/2012    Hives Gi distress Morphine  03/26/2012    Other (comments) Neurological reaction Other Medication  10/24/2016    Palpitations Muscle relaxants Pcn [Penicillins]  03/26/2012    Swelling Propoxyphene N-acetaminophen  03/26/2012    Hives Sulfa (Sulfonamide Antibiotics)  03/26/2012    Hives Current Immunizations  Never Reviewed No immunizations on file. Not reviewed this visit You Were Diagnosed With   
  
 Codes Comments DDD (degenerative disc disease), lumbar    -  Primary ICD-10-CM: M51.36 
ICD-9-CM: 722.52 Fibromyalgia     ICD-10-CM: M79.7 ICD-9-CM: 729.1 Vitals BP Pulse Temp Resp Height(growth percentile) Weight(growth percentile) 154/83 68 97.1 °F (36.2 °C) (Oral) 17 5' 6\" (1.676 m) 236 lb (107 kg) SpO2 BMI OB Status Smoking Status 98% 38.09 kg/m2 Hysterectomy Never Smoker BMI and BSA Data Body Mass Index Body Surface Area 38.09 kg/m 2 2.23 m 2 Preferred Pharmacy Pharmacy Name Phone [facility][id number] - [address] [phone] Your Updated Medication List  
  
   
This list is accurate as of 4/12/18  2:24 PM.  Always use your most recent med list.  
  
  
  
  
 ACCU-CHEK TAMY PLUS TEST STRP strip Generic drug:  glucose blood VI test strips [address] Generic drug:  Lancets  
  
 aluminum hydrox-magnesium carb  mg/15 mL suspension Commonly known as:  GAVISCON Take 15 mL by mouth every six (6) hours as needed for Indigestion. doxazosin 4 mg tablet Commonly known as:  CARDURA Take 1 Tab by mouth nightly. DULoxetine 20 mg capsule Commonly known as:  CYMBALTA Take 1 Cap by mouth daily. fluticasone 50 mcg/actuation nasal spray Commonly known as:  FLONASE  
  
 hydroCHLOROthiazide 12.5 mg tablet Commonly known as:  HYDRODIURIL Take 12.5 mg by mouth every other day. loratadine 10 mg tablet Commonly known as:  Andrae Found Take 10 mg by mouth.  
  
 metoprolol succinate 200 mg XL tablet Commonly known as:  TOPROL-XL Take 1 Tab by mouth daily. NORVASC 10 mg tablet Generic drug:  amLODIPine Take  by mouth daily. omeprazole 40 mg capsule Commonly known as:  PRILOSEC Take 40 mg by mouth daily. traMADol-acetaminophen 37.5-325 mg per tablet Commonly known as:  ULTRACET Take 1 Tab by Mouth Every 6 Hours As Needed for Pain. No more than 8 tabs a day  
  
 triamcinolone-emollient comb86 0.1 % Crea 1 Applicator by Apply Externally route daily as needed. TYLENOL EXTRA STRENGTH 500 mg tablet Generic drug:  acetaminophen Take  by mouth every six (6) hours as needed for Pain. We Performed the Following AMB SUPPLY ORDER [[id number] Custom] Comments:  
 TENS supplies REFERRAL TO PHYSICAL THERAPY [DPL01 Custom] Comments:  
 aquatherapy Follow-up Instructions Return in about 6 weeks (around 5/24/2018). Referral Information Referral ID Referred By Referred To  
  
 [id number] Connye Kawasaki M [facility]. [address] [facility], [address] [place], [address] Phone: [phone] Visits Status Start Date End Date 1 New Request 4/12/18 4/12/19 If your referral has a status of pending review or denied, additional information will be sent to support the outcome of this decision. Patient Instructions Back Stretches: Exercises Your Care Instructions Here are some examples of exercises for stretching your back. Start each exercise slowly. Ease off the exercise if you start to have pain. Your doctor or physical therapist will tell you when you can start these exercises and which ones will work best for you. How to do the exercises Overhead stretch 1. Stand comfortably with your feet shoulder-width apart. 2. Looking straight ahead, raise both arms over your head and reach toward the ceiling. Do not allow your head to tilt back. 3. Hold for 15 to 30 seconds, then lower your arms to your sides. 4. Repeat 2 to 4 times. Side stretch 1. Stand comfortably with your feet shoulder-width apart. 2. Raise one arm over your head, and then lean to the other side. 3. Slide your hand down your leg as you let the weight of your arm gently stretch your side muscles. Hold for 15 to 30 seconds. 4. Repeat 2 to 4 times on each side. Press-up 1. Lie on your stomach, supporting your body with your forearms. 2. Press your elbows down into the floor to raise your upper back. As you do this, relax your stomach muscles and allow your back to arch without using your back muscles. As your press up, do not let your hips or pelvis come off the floor. 3. Hold for 15 to 30 seconds, then relax. 4. Repeat 2 to 4 times. Relax and rest 
 
1. Lie on your back with a rolled towel under your neck and a pillow under your knees. Extend your arms comfortably to your sides. 2. Relax and breathe normally. 3. Remain in this position for about 10 minutes. 4. If you can, do this 2 or 3 times each day. Follow-up care is a key part of your treatment and safety. Be sure to make and go to all appointments, and call your doctor if you are having problems. It's also a good idea to know your test results and keep a list of the medicines you take. Where can you learn more? Go to http://angie-dayanara.info/. Enter I675 in the search box to learn more about \"Back Stretches: Exercises. \" Current as of: March 21, 2017 Content Version: 11.4 © 5815-8403 Healthwise, Incorporated. Care instructions adapted under license by [employer] (which disclaims liability or warranty for this information).  If you have questions about a medical condition or this instruction, always ask your healthcare professional. [place] [address] any warranty or liability for your use of this information. Low Back Pain: Exercises Your Care Instructions Here are some examples of typical rehabilitation exercises for your condition. Start each exercise slowly. Ease off the exercise if you start to have pain. Your doctor or physical therapist will tell you when you can start these exercises and which ones will work best for you. How to do the exercises Press-up 5. Lie on your stomach, supporting your body with your forearms. 6. Press your elbows down into the floor to raise your upper back. As you do this, relax your stomach muscles and allow your back to arch without using your back muscles. As your press up, do not let your hips or pelvis come off the floor. 7. Hold for 15 to 30 seconds, then relax. 8. Repeat 2 to 4 times. Alternate arm and leg (bird dog) exercise Do this exercise slowly. Try to keep your body straight at all times, and do not let one hip drop lower than the other. 5. Start on the floor, on your hands and knees. 6. Tighten your belly muscles. 7. Raise one leg off the floor, and hold it straight out behind you. Be careful not to let your hip drop down, because that will twist your trunk. 8. Hold for about 6 seconds, then lower your leg and switch to the other leg. 9. Repeat 8 to 12 times on each leg. 10. Over time, work up to holding for 10 to 30 seconds each time. 11. If you feel stable and secure with your leg raised, try raising the opposite arm straight out in front of you at the same time. Knee-to-chest exercise 5. Lie on your back with your knees bent and your feet flat on the floor. 6. Bring one knee to your chest, keeping the other foot flat on the floor (or keeping the other leg straight, whichever feels better on your lower back). 7. Keep your lower back pressed to the floor.  Hold for at least 15 to 30 seconds. 8. Relax, and lower the knee to the starting position. 9. Repeat with the other leg. Repeat 2 to 4 times with each leg. 10. To get more stretch, put your other leg flat on the floor while pulling your knee to your chest. 
Curl-ups 5. Lie on the floor on your back with your knees bent at a 90-degree angle. Your feet should be flat on the floor, about 12 inches from your buttocks. 6. Cross your arms over your chest. If this bothers your neck, try putting your hands behind your neck (not your head), with your elbows spread apart. 7. Slowly tighten your belly muscles and raise your shoulder blades off the floor. 8. Keep your head in line with your body, and do not press your chin to your chest. 
9. Hold this position for 1 or 2 seconds, then slowly lower yourself back down to the floor. 10. Repeat 8 to 12 times. Pelvic tilt exercise 1. Lie on your back with your knees bent. 2. \"Brace\" your stomach. This means to tighten your muscles by pulling in and imagining your belly button moving toward your spine. You should feel like your back is pressing to the floor and your hips and pelvis are rocking back. 3. Hold for about 6 seconds while you breathe smoothly. 4. Repeat 8 to 12 times. Heel dig bridging 1. Lie on your back with both knees bent and your ankles bent so that only your heels are digging into the floor. Your knees should be bent about 90 degrees. 2. Then push your heels into the floor, squeeze your buttocks, and lift your hips off the floor until your shoulders, hips, and knees are all in a straight line. 3. Hold for about 6 seconds as you continue to breathe normally, and then slowly lower your hips back down to the floor and rest for up to 10 seconds. 4. Do 8 to 12 repetitions. Hamstring stretch in doorway 1. Lie on your back in a doorway, with one leg through the open door. 2. Slide your leg up the wall to straighten your knee.  You should feel a gentle stretch down the back of your leg. 3. Hold the stretch for at least 15 to 30 seconds. Do not arch your back, point your toes, or bend either knee. Keep one heel touching the floor and the other heel touching the wall. 4. Repeat with your other leg. 5. Do 2 to 4 times for each leg. Hip flexor stretch 1. Kneel on the floor with one knee bent and one leg behind you. Place your forward knee over your foot. Keep your other knee touching the floor. 2. Slowly push your hips forward until you feel a stretch in the upper thigh of your rear leg. 3. Hold the stretch for at least 15 to 30 seconds. Repeat with your other leg. 4. Do 2 to 4 times on each side. Wall sit 1. Stand with your back 10 to 12 inches away from a wall. 2. Lean into the wall until your back is flat against it. 3. Slowly slide down until your knees are slightly bent, pressing your lower back into the wall. 4. Hold for about 6 seconds, then slide back up the wall. 5. Repeat 8 to 12 times. Follow-up care is a key part of your treatment and safety. Be sure to make and go to all appointments, and call your doctor if you are having problems. It's also a good idea to know your test results and keep a list of the medicines you take. Where can you learn more? Go to http://angie-dayanara.info/. Enter U249 in the search box to learn more about \"Low Back Pain: Exercises. \" Current as of: March 21, 2017 Content Version: 11.4 © 8903-3887 5minutes. Care instructions adapted under license by [employer] (which disclaims liability or warranty for this information). If you have questions about a medical condition or this instruction, always ask your healthcare professional. [place] [address] any warranty or liability for your use of this information. Healthy Upper Back: Exercises Your Care Instructions Here are some examples of exercises for your upper back.  Start each exercise slowly. Ease off the exercise if you start to have pain. Your doctor or physical therapist will tell you when you can start these exercises and which ones will work best for you. How to do the exercises Lower neck and upper back stretch 9. Stretch your arms out in front of your body. Clasp one hand on top of your other hand. 10. Gently reach out so that you feel your shoulder blades stretching away from each other. 11. Gently bend your head forward. 12. Hold for 15 to 30 seconds. 13. Repeat 2 to 4 times. Midback stretch If you have knee pain, do not do this exercise. 12. Kneel on the floor, and sit back on your ankles. 13. Lean forward, place your hands on the floor, and stretch your arms out in front of you. Rest your head between your arms. 14. Gently push your chest toward the floor, reaching as far in front of you as possible. 15. Hold for 15 to 30 seconds. 16. Repeat 2 to 4 times. Shoulder rolls 11. Sit comfortably with your feet shoulder-width apart. You can also do this exercise while standing. 12. Roll your shoulders up, then back, and then down in a smooth, circular motion. 13. Repeat 2 to 4 times. Wall push-up 11. Stand against a wall with your feet about 12 to 24 inches back from the wall. If you feel any pain when you do this exercise, stand closer to the wall. 12. Place your hands on the wall slightly wider apart than your shoulders, and lean forward. 13. Gently lean your body toward the wall. Then push back to your starting position. Keep the motion smooth and controlled. 14. Repeat 8 to 12 times. Resisted shoulder blade squeeze For this exercise, you will need elastic exercise material, such as surgical tubing or Thera-Band. 
5. Sit or stand, holding the band in both hands in front of you. Keep your elbows close to your sides, bent at a 90-degree angle. Your palms should face up.  
6. Squeeze your shoulder blades together, and move your arms to the outside, stretching the band. Be sure to keep your elbows at your sides while you do this. 7. Relax. 8. Repeat 8 to 12 times. Resisted rows For this exercise, you will need elastic exercise material, such as surgical tubing or Thera-Band. 5. Put the band around a solid object, such as a bedpost, at about waist level. Hold one end of the band in each hand. 6. With your elbows at your sides and bent to 90 degrees, pull the band back to move your shoulder blades toward each other. Return to the starting position. 7. Repeat 8 to 12 times. Follow-up care is a key part of your treatment and safety. Be sure to make and go to all appointments, and call your doctor if you are having problems. It's also a good idea to know your test results and keep a list of the medicines you take. Where can you learn more? Go to http://angie-dayanara.info/. Enter Q884 in the search box to learn more about \"Healthy Upper Back: Exercises. \" Current as of: March 21, 2017 Content Version: 11.4 © 7430-8479 [employer]. Care instructions adapted under license by [employer] (which disclaims liability or warranty for this information). If you have questions about a medical condition or this instruction, always ask your healthcare professional. [place] [address] any warranty or liability for your use of this information. Back Care and Preventing Injuries: Care Instructions Your Care Instructions You can hurt your back doing many everyday activities: lifting a heavy box, bending down to garden, exercising at the gym, and even getting out of bed. But you can keep your back strong and healthy by doing some exercises. You also can follow a few tips for sitting, sleeping, and lifting to avoid hurting your back again. Talk to your doctor before you start an exercise program. Ask for help if you want to learn more about keeping your back healthy. Follow-up care is a key part of your treatment and safety. Be sure to make and go to all appointments, and call your doctor if you are having problems. It's also a good idea to know your test results and keep a list of the medicines you take. How can you care for yourself at home? · Stay at a healthy weight to avoid strain on your lower back. · Do not smoke. Smoking increases the risk of osteoporosis, which weakens the spine. If you need help quitting, talk to your doctor about stop-smoking programs and medicines. These can increase your chances of quitting for good. · Make sure you sleep in a position that maintains your back's normal curves and on a mattress that feels comfortable. Sleep on your side with a pillow between your knees, or sleep on your back with a pillow under your knees. These positions can reduce strain on your back. · When you get out of bed, lie on your side and bend both knees. Drop your feet over the edge of the bed as you push up with both arms. Scoot to the edge of the bed. Make sure your feet are in line with your rear end (buttocks), and then stand up. · If you must stand for a long time, put one foot on a stool, ledge, or box. Exercise to strengthen your back and other muscles · Get at least 30 minutes of exercise on most days of the week. Walking is a good choice. You also may want to do other activities, such as running, swimming, cycling, or playing tennis or team sports. · Stretch your back muscles. Here are few exercises to try: ¨ Lie on your back with your knees bent and your feet flat on the floor. Gently pull one bent knee to your chest. Put that foot back on the floor, and then pull the other knee to your chest. Hold for 15 to 30 seconds. Repeat 2 to 4 times. ¨ Do pelvic tilts. Lie on your back with your knees bent. Tighten your stomach muscles. Pull your belly button (navel) in and up toward your ribs.  You should feel like your back is pressing to the floor and your hips and pelvis are slightly lifting off the floor. Hold for 6 seconds while breathing smoothly. · Keep your core muscles strong. The muscles of your back, belly (abdomen), and buttocks support your spine. ¨ Pull in your belly, and imagine pulling your navel toward your spine. Hold this for 6 seconds, then relax. Remember to keep breathing normally as you tense your muscles. ¨ Do curl-ups. Always do them with your knees bent. Keep your low back on the floor, and curl your shoulders toward your knees using a smooth, slow motion. Keep your arms folded across your chest. If this bothers your neck, try putting your hands behind your neck (not your head), with your elbows spread apart. ¨ Lie on your back with your knees bent and your feet flat on the floor. Tighten your belly muscles, and then push with your feet and raise your buttocks up a few inches. Hold this position 6 seconds as you continue to breathe normally, then lower yourself slowly to the floor. Repeat 8 to 12 times. ¨ If you like group exercise, try Pilates or yoga. These classes have poses that strengthen the core muscles. Protect your back when you sit · Place a small pillow, a rolled-up towel, or a lumbar roll in the curve of your back if you need extra support. · Sit in a chair that is low enough to let you place both feet flat on the floor with both knees nearly level with your hips. If your chair or desk is too high, use a foot rest to raise your knees. · When driving, keep your knees nearly level with your hips. Sit straight, and drive with both hands on the steering wheel. Your arms should be in a slightly bent position. · Try a kneeling chair, which helps tilt your hips forward. This takes pressure off your lower back. · Try sitting on an exercise ball. It can rock from side to side, which helps keep your back loose. Lift properly · Squat down, bending at the hips and knees only.  If you need to, put one knee to the floor and extend your other knee in front of you, bent at a right angle (half kneeling). · Press your chest straight forward. This helps keep your upper back straight while keeping a slight arch in your low back. · Hold the load as close to your body as possible, at the level of your navel. · Use your feet to change direction, taking small steps. · Lead with your hips as you change direction. Keep your shoulders in line with your hips as you move. Do not twist your body. · Set down your load carefully, squatting with your knees and hips only. When should you call for help? Watch closely for changes in your health, and be sure to contact your doctor if you have any problems. Where can you learn more? Go to http://angie-dayanara.info/. Enter S810 in the search box to learn more about \"Back Care and Preventing Injuries: Care Instructions. \" Current as of: March 21, 2017 Content Version: 11.4 © 6228-6069 [employer]. Care instructions adapted under license by [employer] (which disclaims liability or warranty for this information). If you have questions about a medical condition or this instruction, always ask your healthcare professional. [place] [address] any warranty or liability for your use of this information. Introducing [facility]! [employer] introduces [employer] patient portal. Now you can access parts of your medical record, email your doctor's office, and request medication refills online. 1. In your internet browser, go to [email or web address] 2. Click on the First Time User? Click Here link in the Sign In box. You will see the New Member Sign Up page. 3. Enter your [employer] Access Code exactly as it appears below. You will not need to use this code after youve completed the sign-up process. If you do not sign up before the expiration date, you must request a new code. · [employer] Access Code: [email or web address] Expires: 6/24/2018 12:22 PM 
 
4. Enter the last four digits of your Social Security Number (xxxx) and Date of Birth (mm/dd/yyyy) as indicated and click Submit. You will be taken to the next sign-up page. 5. Create a [employer] ID. This will be your [employer] login ID and cannot be changed, so think of one that is secure and easy to remember. 6. Create a [employer] password. You can change your password at any time. 7. Enter your Password Reset Question and Answer. This can be used at a later time if you forget your password. 8. Enter your e-mail address. You will receive e-mail notification when new information is available in [address] 9. Click Sign Up. You can now view and download portions of your medical record. 10. Click the Download Summary menu link to download a portable copy of your medical information. If you have questions, please visit the Frequently Asked Questions section of the [employer] website. Remember, [employer] is NOT to be used for urgent needs. For medical emergencies, dial 911. Now available from your iPhone and Android! Please provide this summary of care documentation to your next provider. Your primary care clinician is listed as Anastasia Erickson. If you have any questions after today's visit, please call [phone].

## 2018-04-15 PROBLEM — G60.8 IDIOPATHIC SENSORIMOTOR AXONAL NEUROPATHY: Status: ACTIVE | Noted: 2018-04-15

## 2018-05-04 ENCOUNTER — TELEPHONE (OUTPATIENT)
Dept: ORTHOPEDIC SURGERY | Age: 78
End: 2018-05-04

## 2018-05-04 DIAGNOSIS — M51.36 DDD (DEGENERATIVE DISC DISEASE), LUMBAR: Primary | ICD-10-CM

## 2018-05-04 NOTE — TELEPHONE ENCOUNTER
PATIENT CALLED FOR . PATIENT SAID THAT HER [employer] INSURANCE WILL NOT COVER WATER EXERCISE AT THE POOL . THAT THEY WILL ONLY COVER LAND EXERCISE. PATIENT WOULD LIKE TO KNOW WHAT SHE CAN DO. PATIENT TEL. [phone].

## 2018-05-04 NOTE — TELEPHONE ENCOUNTER
If her insurance won't pay for aqua therapy, she can try land therapy if she would like. They will have to go slower.

## 2018-05-07 NOTE — TELEPHONE ENCOUNTER
-called patient and verified name . The patient was informed that she could do land PT instead of aqua. She wanted to do it at a [facility] facility. An order was created for [facility] at [address] [place] [address] in [place]. The facility was notified and the patient given the number to the facility.  No further action needed

## 2018-05-07 NOTE — TELEPHONE ENCOUNTER
-called patient and voicemail did not identify patient. A message was left for patient to call [phone] at [address] [place] [address] in regards to the patient's message.    -patient can try land physical therapy for an alternate option.

## 2018-05-07 NOTE — TELEPHONE ENCOUNTER
PATIENT CALLED . SAID SHE WAS RETURNING [facility]. PLEASE CALL PATIENT AT TEL. [phone].

## 2018-08-02 ENCOUNTER — OFFICE VISIT (OUTPATIENT)
Dept: ORTHOPEDIC SURGERY | Age: 78
End: 2018-08-02

## 2018-08-02 VITALS
BODY MASS INDEX: 38.09 KG/M2 | OXYGEN SATURATION: 99 % | DIASTOLIC BLOOD PRESSURE: 77 MMHG | RESPIRATION RATE: 16 BRPM | TEMPERATURE: 97.8 F | HEART RATE: 67 BPM | WEIGHT: 237 LBS | HEIGHT: 66 IN | SYSTOLIC BLOOD PRESSURE: 159 MMHG

## 2018-08-02 DIAGNOSIS — M51.36 DDD (DEGENERATIVE DISC DISEASE), LUMBAR: ICD-10-CM

## 2018-08-02 DIAGNOSIS — M79.7 FIBROMYALGIA: Primary | ICD-10-CM

## 2018-08-02 NOTE — PATIENT INSTRUCTIONS
Preventing Falls: Care Instructions  Your Care Instructions    Getting around your home safely can be a challenge if you have injuries or health problems that make it easy for you to fall. Loose rugs and furniture in walkways are among the dangers for many older people who have problems walking or who have poor eyesight. People who have conditions such as arthritis, osteoporosis, or dementia also have to be careful not to fall. You can make your home safer with a few simple measures. Follow-up care is a key part of your treatment and safety. Be sure to make and go to all appointments, and call your doctor if you are having problems. It's also a good idea to know your test results and keep a list of the medicines you take. How can you care for yourself at home? Taking care of yourself  · You may get dizzy if you do not drink enough water. To prevent dehydration, drink plenty of fluids, enough so that your urine is light yellow or clear like water. Choose water and other caffeine-free clear liquids. If you have kidney, heart, or liver disease and have to limit fluids, talk with your doctor before you increase the amount of fluids you drink. · Exercise regularly to improve your strength, muscle tone, and balance. Walk if you can. Swimming may be a good choice if you cannot walk easily. · Have your vision and hearing checked each year or any time you notice a change. If you have trouble seeing and hearing, you might not be able to avoid objects and could lose your balance. · Know the side effects of the medicines you take. Ask your doctor or pharmacist whether the medicines you take can affect your balance. Sleeping pills or sedatives can affect your balance. · Limit the amount of alcohol you drink. Alcohol can impair your balance and other senses. · Ask your doctor whether calluses or corns on your feet need to be removed.  If you wear loose-fitting shoes because of calluses or corns, you can lose your balance and fall. · Talk to your doctor if you have numbness in your feet. Preventing falls at home  · Remove raised doorway thresholds, throw rugs, and clutter. Repair loose carpet or raised areas in the floor. · Move furniture and electrical cords to keep them out of walking paths. · Use nonskid floor wax, and wipe up spills right away, especially on ceramic tile floors. · If you use a walker or cane, put rubber tips on it. If you use crutches, clean the bottoms of them regularly with an abrasive pad, such as steel wool. · Keep your house well lit, especially [facility], and outside walkways. Use night-lights in areas such as hallways and bathrooms. Add extra light switches or use remote switches (such as switches that go on or off when you clap your hands) to make it easier to turn lights on if you have to get up during the night. · Install sturdy handrails on stairways. · Move items in your cabinets so that the things you use a lot are on the lower shelves (about waist level). · Keep a cordless phone and a flashlight with new batteries by your bed. If possible, put a phone in each of the main rooms of your house, or carry a cell phone in case you fall and cannot reach a phone. Or, you can wear a device around your neck or wrist. You push a button that sends a signal for help. · Wear low-heeled shoes that fit well and give your feet good support. Use footwear with nonskid soles. Check the heels and soles of your shoes for wear. Repair or replace worn heels or soles. · Do not wear socks without shoes on wood floors. · Walk on the grass when the sidewalks are slippery. If you live in an area that gets snow and ice in the winter, sprinkle salt on slippery steps and sidewalks. Preventing falls in the bath  · Install grab bars and nonskid mats inside and outside your shower or tub and near the toilet and sinks. · Use shower chairs and bath benches.   · Use a hand-held shower head that will allow you to sit while showering. · Get into a tub or shower by putting the weaker leg in first. Get out of a tub or shower with your strong side first.  · Repair loose toilet seats and consider installing a raised toilet seat to make getting on and off the toilet easier. · Keep your bathroom door unlocked while you are in the shower. Where can you learn more? Go to http://angie-dayanara.info/. Enter 0476 [id number] in the search box to learn more about \"Preventing Falls: Care Instructions. \"  Current as of: May 12, 2017  Content Version: 11.7  © 4876-6540 [employer]. Care instructions adapted under license by [employer] (which disclaims liability or warranty for this information). If you have questions about a medical condition or this instruction, always ask your healthcare professional. [place] [address] any warranty or liability for your use of this information. Preventing Outdoor Falls: Care Instructions  Your Care Instructions    Worries about falls don't need to keep you indoors. Outdoor activities like walking have big benefits for your health. You will need to watch your step and learn a few safety measures. If you are worried about having a fall outdoors, ask your doctor about exercises, classes, or physical therapy that may help. You can learn ways to gain strength, flexibility, and balance. Ask if it might help to use a cane or walker. You can make your time outdoors safer with a few simple measures. Follow-up care is a key part of your treatment and safety. Be sure to make and go to all appointments, and call your doctor if you are having problems. It's also a good idea to know your test results and keep a list of the medicines you take. How can you prevent falls outdoors? · Wear shoes with firm soles and low heels. If you have to walk on an icy surface, use grippers that can be worn over your shoes in bad weather.   · Be extra careful if weather is bad. Walk on the grass when the sidewalks are slick. If you live in a place that gets snow and ice in the winter, sprinkle salt on slippery stairs and sidewalks. · Be careful getting on or off buses and trains or getting in and out of cars. If handrails are available, use them. · Be careful when you cross the street. Look for crosswalks or places where curb cuts or ramps are present. · Try not to hurry, especially if you are carrying something. · Be cautious in parking lots or garages. There may be curbs or changes in pavement, or the height of the pavement may vary. · Make sure to wear the correct eyeglasses, if you need them. Reading glasses or bifocals can make it harder to see hazards that might be in your way. · If you are walking outdoors for exercise, try to:  ¨ Walk in well-lighted, well-maintained areas. These include high school or college tracks, shopping malls, and public spaces. ¨ Walk with a partner. ¨ Watch out for cracked sidewalks, curbs, changes in the height of the pavement, exposed tree roots, and debris such as fallen leaves or branches. Where can you learn more? Go to http://angie-dayanara.info/. Enter W309 in the search box to learn more about \"Preventing Outdoor Falls: Care Instructions. \"  Current as of: May 12, 2017  Content Version: 11.7  © 1130-5364 [employer]. Care instructions adapted under license by [employer] (which disclaims liability or warranty for this information). If you have questions about a medical condition or this instruction, always ask your healthcare professional. [place] [address] any warranty or liability for your use of this information.

## 2018-08-02 NOTE — MR AVS SNAPSHOT
[address] [place], [place] [address] [place] [address] 
[phone] Patient: [facility] MRN: [id number] ADAL:8/94/9574 Visit Information Date & Time Provider Department Dept. Phone Encounter #  
 8/2/2018  3:15 PM Mary Beth Cuellar MD [facility] [phone] Follow-up Instructions Return in about 2 months (around 10/2/2018). Upcoming Health Maintenance Date Due DTaP/Tdap/Td series (1 - Tdap) 9/10/1959 ZOSTER VACCINE AGE 60> 7/10/1998 GLAUCOMA SCREENING Q2Y 9/10/2003 Pneumococcal 65+ High/Highest Risk (1 of 2 - PCV13) 9/10/2003 MEDICARE YEARLY EXAM 3/14/2018 Influenza Age 5 to Adult 8/1/2018 Allergies as of 8/2/2018  Review Complete On: 8/2/2018 By: Mary Beth Cuellar MD  
  
 Severity Noted Reaction Type Reactions Aspirin  03/26/2012    Other (comments) Codeine  03/26/2012    Other (comments) Gi distress Darvon [Propoxyphene]  03/26/2012    Rash, Itching Gi distress Dextromethorphan  04/24/2013    Other (comments) Stomach cramps Meperidine  03/26/2012    Hives Gi distress Morphine  03/26/2012    Other (comments) Neurological reaction Other Medication  10/24/2016    Palpitations Muscle relaxants Pcn [Penicillins]  03/26/2012    Swelling Propoxyphene N-acetaminophen  03/26/2012    Hives Sulfa (Sulfonamide Antibiotics)  03/26/2012    Hives Current Immunizations  Never Reviewed No immunizations on file. Not reviewed this visit You Were Diagnosed With   
  
 Codes Comments Fibromyalgia    -  Primary ICD-10-CM: M79.7 ICD-9-CM: 729.1 Vitals BP Pulse Temp Resp Height(growth percentile) Weight(growth percentile) 159/77 67 97.8 °F (36.6 °C) 16 5' 6\" (1.676 m) 237 lb (107.5 kg) SpO2 BMI OB Status Smoking Status 99% 38.25 kg/m2 Hysterectomy Never Smoker BMI and BSA Data Body Mass Index Body Surface Area  
 38.25 kg/m 2 2.24 m 2 Preferred Pharmacy Pharmacy Name Phone [facility] [id number] Katia Stone, [facility] [phone] Your Updated Medication List  
  
   
This list is accurate as of 8/2/18  4:26 PM.  Always use your most recent med list.  
  
  
  
  
 ACCU-CHEK TAMY PLUS TEST STRP strip Generic drug:  glucose blood VI test strips [address] Generic drug:  Lancets  
  
 aluminum hydrox-magnesium carb  mg/15 mL suspension Commonly known as:  GAVISCON Take 15 mL by mouth every six (6) hours as needed for Indigestion. doxazosin 4 mg tablet Commonly known as:  CARDURA Take 1 Tab by mouth nightly. DULoxetine 20 mg capsule Commonly known as:  CYMBALTA Take 1 Cap by mouth daily. fluticasone 50 mcg/actuation nasal spray Commonly known as:  FLONASE  
  
 hydroCHLOROthiazide 12.5 mg tablet Commonly known as:  HYDRODIURIL Take 12.5 mg by mouth every other day. loratadine 10 mg tablet Commonly known as:  Kiarra Maria Luz Take 10 mg by mouth.  
  
 metoprolol succinate 200 mg XL tablet Commonly known as:  TOPROL-XL Take 1 Tab by mouth daily. mirabegron ER 50 mg ER tablet Commonly known as:  MYRBETRIQ Take 1 Tab by mouth daily. NORVASC 10 mg tablet Generic drug:  amLODIPine Take  by mouth daily. omeprazole 40 mg capsule Commonly known as:  PRILOSEC Take 40 mg by mouth daily. traMADol-acetaminophen 37.5-325 mg per tablet Commonly known as:  ULTRACET Take 1 Tab by Mouth Every 6 Hours As Needed for Pain. No more than 8 tabs a day  
  
 triamcinolone-emollient comb86 0.1 % Crea 1 Applicator by Apply Externally route daily as needed. TYLENOL EXTRA STRENGTH 500 mg tablet Generic drug:  acetaminophen Take  by mouth every six (6) hours as needed for Pain. We Performed the Following [facility] SUPPLY ORDER [[id number] Custom] Comments:  
 Tens unit Follow-up Instructions Return in about 2 months (around 10/2/2018). Patient Instructions Preventing Falls: Care Instructions Your Care Instructions Getting around your home safely can be a challenge if you have injuries or health problems that make it easy for you to fall. Loose rugs and furniture in walkways are among the dangers for many older people who have problems walking or who have poor eyesight. People who have conditions such as arthritis, osteoporosis, or dementia also have to be careful not to fall. You can make your home safer with a few simple measures. Follow-up care is a key part of your treatment and safety. Be sure to make and go to all appointments, and call your doctor if you are having problems. It's also a good idea to know your test results and keep a list of the medicines you take. How can you care for yourself at home? Taking care of yourself · You may get dizzy if you do not drink enough water. To prevent dehydration, drink plenty of fluids, enough so that your urine is light yellow or clear like water. Choose water and other caffeine-free clear liquids. If you have kidney, heart, or liver disease and have to limit fluids, talk with your doctor before you increase the amount of fluids you drink. · Exercise regularly to improve your strength, muscle tone, and balance. Walk if you can. Swimming may be a good choice if you cannot walk easily. · Have your vision and hearing checked each year or any time you notice a change. If you have trouble seeing and hearing, you might not be able to avoid objects and could lose your balance. · Know the side effects of the medicines you take. Ask your doctor or pharmacist whether the medicines you take can affect your balance. Sleeping pills or sedatives can affect your balance. · Limit the amount of alcohol you drink.  Alcohol can impair your balance and other senses. · Ask your doctor whether calluses or corns on your feet need to be removed. If you wear loose-fitting shoes because of calluses or corns, you can lose your balance and fall. · Talk to your doctor if you have numbness in your feet. Preventing falls at home · Remove raised doorway thresholds, throw rugs, and clutter. Repair loose carpet or raised areas in the floor. · Move furniture and electrical cords to keep them out of walking paths. · Use nonskid floor wax, and wipe up spills right away, especially on ceramic tile floors. · If you use a walker or cane, put rubber tips on it. If you use crutches, clean the bottoms of them regularly with an abrasive pad, such as steel wool. · Keep your house well lit, especially [place], and outside walkways. Use night-lights in areas such as hallways and bathrooms. Add extra light switches or use remote switches (such as switches that go on or off when you clap your hands) to make it easier to turn lights on if you have to get up during the night. · Install sturdy handrails on stairways. · Move items in your cabinets so that the things you use a lot are on the lower shelves (about waist level). · Keep a cordless phone and a flashlight with new batteries by your bed. If possible, put a phone in each of the main rooms of your house, or carry a cell phone in case you fall and cannot reach a phone. Or, you can wear a device around your neck or wrist. You push a button that sends a signal for help. · Wear low-heeled shoes that fit well and give your feet good support. Use footwear with nonskid soles. Check the heels and soles of your shoes for wear. Repair or replace worn heels or soles. · Do not wear socks without shoes on wood floors. · Walk on the grass when the sidewalks are slippery. If you live in an area that gets snow and ice in the winter, sprinkle salt on slippery steps and sidewalks. Preventing falls in the bath · Install grab bars and nonskid mats inside and outside your shower or tub and near the toilet and sinks. · Use shower chairs and bath benches. · Use a hand-held shower head that will allow you to sit while showering. · Get into a tub or shower by putting the weaker leg in first. Get out of a tub or shower with your strong side first. 
· Repair loose toilet seats and consider installing a raised toilet seat to make getting on and off the toilet easier. · Keep your bathroom door unlocked while you are in the shower. Where can you learn more? Go to http://angieTwitt2godayanara.info/. Enter [id number] [id number] in the search box to learn more about \"Preventing Falls: Care Instructions. \" Current as of: May 12, 2017 Content Version: 11.7 © 6937-7826 [employer]. Care instructions adapted under license by [employer] (which disclaims liability or warranty for this information). If you have questions about a medical condition or this instruction, always ask your healthcare professional. [place] [address] any warranty or liability for your use of this information. Preventing Outdoor Falls: Care Instructions Your Care Instructions Worries about falls don't need to keep you indoors. Outdoor activities like walking have big benefits for your health. You will need to watch your step and learn a few safety measures. If you are worried about having a fall outdoors, ask your doctor about exercises, classes, or physical therapy that may help. You can learn ways to gain strength, flexibility, and balance. Ask if it might help to use a cane or walker. You can make your time outdoors safer with a few simple measures. Follow-up care is a key part of your treatment and safety. Be sure to make and go to all appointments, and call your doctor if you are having problems. It's also a good idea to know your test results and keep a list of the medicines you take. How can you prevent falls outdoors? · Wear shoes with firm soles and low heels. If you have to walk on an icy surface, use grippers that can be worn over your shoes in bad weather. · Be extra careful if weather is bad. Walk on the grass when the sidewalks are slick. If you live in a place that gets snow and ice in the winter, sprinkle salt on slippery stairs and sidewalks. · Be careful getting on or off buses and trains or getting in and out of cars. If handrails are available, use them. · Be careful when you cross the street. Look for crosswalks or places where curb cuts or ramps are present. · Try not to hurry, especially if you are carrying something. · Be cautious in parking lots or garages. There may be curbs or changes in pavement, or the height of the pavement may vary. · Make sure to wear the correct eyeglasses, if you need them. Reading glasses or bifocals can make it harder to see hazards that might be in your way. · If you are walking outdoors for exercise, try to: 
¨ Walk in well-lighted, well-maintained areas. These include high school or college tracks, shopping malls, and public spaces. ¨ Walk with a partner. ¨ Watch out for cracked sidewalks, curbs, changes in the height of the pavement, exposed tree roots, and debris such as fallen leaves or branches. Where can you learn more? Go to http://angie-dayanara.info/. Enter J887 in the search box to learn more about \"Preventing Outdoor Falls: Care Instructions. \" Current as of: May 12, 2017 Content Version: 11.7 © 4302-0082 [employer]. Care instructions adapted under license by [employer] (which disclaims liability or warranty for this information). If you have questions about a medical condition or this instruction, always ask your healthcare professional. [address] any warranty or liability for your use of this information. Introducing [facility] & HEALTH SERVICES! Dear Jas Hawkins: Thank you for requesting a TwentyFeet account. Our records indicate that you already have an active TwentyFeet account. You can access your account anytime at [email or web address] Did you know that you can access your hospital and ER discharge instructions at any time in TwentyFeet? You can also review all of your test results from your hospital stay or ER visit. Additional Information If you have questions, please visit the Frequently Asked Questions section of the TwentyFeet website at [email or web address]. Remember, TwentyFeet is NOT to be used for urgent needs. For medical emergencies, dial 911. Now available from your iPhone and Android! Please provide this summary of care documentation to your next provider. Your primary care clinician is listed as Jacoby Silverman. If you have any questions after today's visit, please call [phone].

## 2018-08-02 NOTE — PROGRESS NOTES
[facility] [address]  [address], [address]  [place] [address]  Phone: [phone]  Fax: [phone]        Christian Hansen  : 1940  PCP: Jacoby Silverman MD    PROGRESS NOTE      ASSESSMENT AND PLAN    Diagnoses and all orders for this visit:    1. Fibromyalgia  -     Generic Supply Order     1. Advised to continue HEP. 2. Rx for TENS unit  3. Continue physical therapy  4. Given information on fall prevention    Follow-up Disposition:  Return in about 2 months (around 10/2/2018). HISTORY OF PRESENT ILLNESS  Moncho Marsk is a 76 y.o. female. Pt presents to the office for a f/u visit for back pain and physical therapy. Pt reports benefit from Physical therapy and their TENS unit. Pt states she does not have a home TENS unit. Pt reports difficulty sleeping and reports she sleeps in a recliner. Pt reports numbness in her BLE. Pt reports short standing and walking tolerance. Pt states she has a cane to use when she needs it. Location of pain: upper back and lower back  Does pain radiate into extremities: B/L buttock and BLE R>L  Does patient have weakness: Pt notes she has had some in R foot   Pt denies saddle paresthesias. Medications pt is on: Pt reports not taking Cymbalta because she is afraid of side effects. Pt denies recent ED visits or hospitalizations. Denies persistent fevers, chills, weight changes, neurogenic bowel or bladder symptoms. Treatments patient has tried:  Physical therapy:Yes, not aqua since insurance will not cover it  Non-opioid medications: Yes  Spinal injections: Unknown  Spinal surgery- No.        reviewed. Hx of renal cell carcinoma Pt reports she has a fallen arch and uses a brace to prevent it from turning excessively. This is a pt of Dr. Rosita Ivey, has a Dx of fibromyalgia and chronic ankle pain. She also consults with Dr. Maurice Ortiz.     Pain Assessment  2018   Location of Pain Leg;Back   Pain Location Comment -   Location Modifiers Left;Right   Severity of Pain 8   Quality of Pain Dull   Quality of Pain Comment -   Duration of Pain Persistent   Frequency of Pain Constant   Date Pain First Started -   Aggravating Factors Standing;Walking   Aggravating Factors Comment -   Limiting Behavior Some   Relieving Factors Elevation; Heat   Relieving Factors Comment -   Result of Injury No   Work-Related Injury -   Type of Injury -   Type of Injury Comment -       PAST MEDICAL HISTORY   Past Medical History:   Diagnosis Date    Arthritis     Arthropathy, unspecified, site unspecified     Bruising     bruising and bleeding    Diverticulitis     Diverticulosis     Essential hypertension     Fibromyalgia     GERD (gastroesophageal reflux disease)     Hearing loss     Idiopathic sensory axonal neuropathy, dx EMG/NCV '17 4/15/2018    Obesity (BMI 30-39. 9)     Postoperative respiratory failure ([facility] [address] 9/30/10    Pre-diabetes     11/16 diet controlled    Renal cell cancer ([facility] [address]     Stage T1NxMx    Respiratory failure, post-operative ([facility]     Wears glasses        Past Surgical History:   Procedure Laterality Date    HX BREAST BIOPSY      HX BREAST REDUCTION      HX CATARACT REMOVAL      HX CHOLECYSTECTOMY      HX HERNIA REPAIR      HX HYSTERECTOMY  1984     total vaginal    HX OTHER SURGICAL  9/2010    SIGMOIDECTOMY    HX VEIN STRIPPING     . MEDICATIONS    Current Outpatient Prescriptions   Medication Sig Dispense Refill    omeprazole (PRILOSEC) 40 mg capsule Take 40 mg by mouth daily.  fluticasone (FLONASE) 50 mcg/actuation nasal spray       metoprolol succinate (TOPROL-XL) 200 mg XL tablet Take 1 Tab by mouth daily. 90 Tab 2    doxazosin (CARDURA) 4 mg tablet Take 1 Tab by mouth nightly. 90 Tab 2    amLODIPine (NORVASC) 10 mg tablet Take  by mouth daily.  Hydrochlorothiazide 12.5 mg tablet Take 12.5 mg by mouth every other day.       mirabegron ER (MYRBETRIQ) 50 mg ER tablet Take 1 Tab by mouth daily. 90 Tab 3    loratadine (CLARITIN) 10 mg tablet Take 10 mg by mouth.  traMADol-acetaminophen (ULTRACET) 37.5-325 mg per tablet Take 1 Tab by Mouth Every 6 Hours As Needed for Pain. No more than 8 tabs a day      DULoxetine (CYMBALTA) 20 mg capsule Take 1 Cap by mouth daily. (Patient taking differently: Take  by mouth daily.) 30 Cap 1    acetaminophen (TYLENOL EXTRA STRENGTH) 500 mg tablet Take  by mouth every six (6) hours as needed for Pain.  ACCU-CHEK SOFTCLIX LANCETS [facility]       ACCU-CHEK TAMY PLUS TEST STRP strip       aluminum hydrox-magnesium carb (GAVISCON)  mg/15 mL suspension Take 15 mL by mouth every six (6) hours as needed for Indigestion.  triamcinolone-emollient cmb#86 0.1 % crea 1 Applicator by Apply Externally route daily as needed. ALLERGIES  Allergies   Allergen Reactions    Aspirin Other (comments)    Codeine Other (comments)     Gi distress      Darvon [Propoxyphene] Rash and Itching     Gi distress    Dextromethorphan Other (comments)     Stomach cramps    Meperidine Hives     Gi distress    Morphine Other (comments)     Neurological reaction    Other Medication Palpitations     Muscle relaxants    Pcn [Penicillins] Swelling    Propoxyphene N-Acetaminophen Hives    Sulfa (Sulfonamide Antibiotics) Hives          SOCIAL HISTORY    Social History     Social History    Marital status:      Spouse name: N/A    Number of children: N/A    Years of education: N/A     Occupational History    Not on file.      Social History Main Topics    Smoking status: Never Smoker    Smokeless tobacco: Never Used    Alcohol use No    Drug use: No    Sexual activity: Not on file     Other Topics Concern    Not on file     Social History Narrative       FAMILY HISTORY  Family History   Problem Relation Age of Onset    Heart Attack Mother 79    Diabetes Other     Hypertension Other     Arthritis-osteo Other     Heart Disease Other     Heart Attack Sister 71       REVIEW OF SYSTEMS  Review of Systems   Constitutional: Negative for chills, fever and weight loss. Respiratory: Negative for shortness of breath. Cardiovascular: Negative for chest pain. Gastrointestinal: Negative for constipation. Negative for fecal incontinence   Genitourinary: Negative for dysuria. Negative for urinary incontinence   Musculoskeletal: Positive for myalgias. Per HPI   Skin: Negative for rash. Neurological: Positive for tingling. Negative for dizziness, tremors, focal weakness and headaches. Endo/Heme/Allergies: Does not bruise/bleed easily. Psychiatric/Behavioral: The patient does not have insomnia. PHYSICAL EXAMINATION  Visit Vitals    /77    Pulse 67    Temp 97.8 °F (36.6 °C)    Resp 16    Ht 5' 6\" (1.676 m)    Wt 237 lb (107.5 kg)    SpO2 99%    BMI 38.25 kg/m2         Accompanied by family member. Constitutional:  Well developed, well nourished, in no acute distress. Psychiatric: Affect and mood are appropriate. Integumentary: No rashes or abrasions noted on exposed areas. Cardiovascular/Peripheral Vascular: Intact l pulses. 1+ pitting edema is noted bilaterally. Lymphatic:  No evidence of lymphedema. No cervical lymphadenopathy. SPINE/MUSCULOSKELETAL EXAM    Cervical spine:  Neck is midline. Normal muscle tone. No focal atrophy is noted. Trigger points noted B/L upper trapezii. Negative Spurling's sign. Negative Tinel's sign. Negative Mccoy's sign. Sensation grossly intact to light touch. Lumbar spine:  No rash, ecchymosis, or gross obliquity. No fasciculations. No focal atrophy is noted. Tenderness to palpation L5-S1. Tenderness to palpation of R sciatic notch, R trochanteric bursa. SI joints non-tender. Sensation grossly intact to light touch.       MOTOR:       Hip Flex  Quads Hamstrings Ankle DF EHL Ankle PF   Right +4/5 +4/5 +4/5 +4/5 +4/5 +4/5   Left +4/5 +4/5 +4/5 +4/5 +4/5 +4/5         Straight Leg raise negative. Ambulation without assistive device. FWB. Pt is not wearing brace and is walking slowly. Written by Sonia Guzman, as dictated by Obinna Bronson MD.    I, Dr. Obinna Bronson MD, confirm that all documentation is accurate. Ms. Isabel Brown may have a reminder for a \"due or due soon\" health maintenance. I have asked that she contact her primary care provider for follow-up on this health maintenance.

## 2018-09-10 ENCOUNTER — TELEPHONE (OUTPATIENT)
Dept: ORTHOPEDIC SURGERY | Age: 78
End: 2018-09-10

## 2018-09-10 NOTE — TELEPHONE ENCOUNTER
I typically recommend continuing with PT, please tell patient to the let the therapist know about the pain. They can work with the patient. If this is a new, severe pain, then they can wait until the apt to be seen.

## 2018-09-10 NOTE — TELEPHONE ENCOUNTER
Patient called back at 4 pm she needs to speak with the nurse about her new kind of pain and therapy. She requesting call back asap today

## 2018-09-11 NOTE — TELEPHONE ENCOUNTER
Ok to continue PT, please let therapist know about the pains she is having so the can work with her or around the pain.

## 2018-09-11 NOTE — TELEPHONE ENCOUNTER
Spoke with patient, informed of NP Buttery message below. Patient stated understanding, no further action needed at this time.

## 2018-09-11 NOTE — TELEPHONE ENCOUNTER
Spoke with patient, stated pain in her lower back is new, and going down the RT side of her hip and leg. She states when she stands her back seems to catch. She has also been using her cane due to not being able to place pressure on her RT side. Patient wants to know should she continue PT at this time. She stated the PT was for another issue, but does not want to make her back worse. Please advise.

## 2018-09-18 ENCOUNTER — OFFICE VISIT (OUTPATIENT)
Dept: ORTHOPEDIC SURGERY | Age: 78
End: 2018-09-18

## 2018-09-18 VITALS
BODY MASS INDEX: 37.51 KG/M2 | RESPIRATION RATE: 17 BRPM | DIASTOLIC BLOOD PRESSURE: 73 MMHG | SYSTOLIC BLOOD PRESSURE: 157 MMHG | WEIGHT: 233.4 LBS | OXYGEN SATURATION: 97 % | HEART RATE: 75 BPM | HEIGHT: 66 IN | TEMPERATURE: 97.5 F

## 2018-09-18 DIAGNOSIS — M51.36 DDD (DEGENERATIVE DISC DISEASE), LUMBAR: Primary | ICD-10-CM

## 2018-09-18 DIAGNOSIS — M79.7 FIBROMYALGIA: ICD-10-CM

## 2018-09-18 NOTE — MR AVS SNAPSHOT
[facility] 
 
 
 [address] [facility] [address] 
[phone] Patient: [facility] MRN: [id number] Visit Information Date & Time Provider Department Dept. Phone Encounter #  
 9/18/2018 11:30 AM Esthela Aguirre NP [facility] [phone] [id number] Your Appointments 10/4/2018  3:30 PM  
Follow Up with Omar Garsia MD  
[facility] ([facility]) Appt Note: 2 MO F/U  
 [address] [facility] [address]  
[phone]  
  
   
 [address] [facility] [address] Upcoming Health Maintenance Date Due DTaP/Tdap/Td series (1 - Tdap) 9/10/1959 ZOSTER VACCINE AGE 60> 7/10/1998 GLAUCOMA SCREENING Q2Y 9/10/2003 Pneumococcal 65+ High/Highest Risk (1 of 2 - PCV13) 9/10/2003 MEDICARE YEARLY EXAM 3/14/2018 Influenza Age 5 to Adult 8/1/2018 Allergies as of 9/18/2018  Review Complete On: 9/18/2018 By: Carrie Montiel RN Severity Noted Reaction Type Reactions Aspirin  03/26/2012    Other (comments) Codeine  03/26/2012    Other (comments) Gi distress Darvon [Propoxyphene]  03/26/2012    Rash, Itching Gi distress Dextromethorphan  04/24/2013    Other (comments) Stomach cramps Meperidine  03/26/2012    Hives Gi distress Morphine  03/26/2012    Other (comments) Neurological reaction Other Medication  10/24/2016    Palpitations Muscle relaxants Pcn [Penicillins]  03/26/2012    Swelling Propoxyphene N-acetaminophen  03/26/2012    Hives Sulfa (Sulfonamide Antibiotics)  03/26/2012    Hives Current Immunizations  Never Reviewed No immunizations on file. Not reviewed this visit Vitals BP Pulse Temp Resp Height(growth percentile) Weight(growth percentile) 157/73 75 97.5 °F (36.4 °C) 17 5' 6\" (1.676 m) 233 lb 6.4 oz (105.9 kg) SpO2 BMI OB Status Smoking Status 97% 37.67 kg/m2 Hysterectomy Never Smoker BMI and BSA Data Body Mass Index Body Surface Area  
 37.67 kg/m 2 2.22 m 2 Preferred Pharmacy Pharmacy Name Phone IRAIDA STEINER [facility] [id number] [place], [facility] [phone] Your Updated Medication List  
  
   
This list is accurate as of 9/18/18 12:10 PM.  Always use your most recent med list.  
  
  
  
  
 ACCU-CHEK TAMY PLUS TEST STRP strip Generic drug:  glucose blood VI test strips [address] Generic drug:  Lancets  
  
 aluminum hydrox-magnesium carb  mg/15 mL suspension Commonly known as:  GAVISCON Take 15 mL by mouth every six (6) hours as needed for Indigestion. doxazosin 4 mg tablet Commonly known as:  CARDURA Take 1 Tab by mouth nightly. DULoxetine 20 mg capsule Commonly known as:  CYMBALTA Take 1 Cap by mouth daily. fluticasone 50 mcg/actuation nasal spray Commonly known as:  FLONASE  
  
 hydroCHLOROthiazide 12.5 mg tablet Commonly known as:  HYDRODIURIL Take 12.5 mg by mouth every other day. loratadine 10 mg tablet Commonly known as:  Drena Magic Take 10 mg by mouth.  
  
 metoprolol succinate 200 mg XL tablet Commonly known as:  TOPROL-XL Take 1 Tab by mouth daily. mirabegron ER 50 mg ER tablet Commonly known as:  MYRBETRIQ Take 1 Tab by mouth daily. NORVASC 10 mg tablet Generic drug:  amLODIPine Take  by mouth daily. omeprazole 40 mg capsule Commonly known as:  PRILOSEC Take 40 mg by mouth daily. traMADol-acetaminophen 37.5-325 mg per tablet Commonly known as:  ULTRACET Take 1 Tab by Mouth Every 6 Hours As Needed for Pain. No more than 8 tabs a day  
  
 triamcinolone-emollient comb86 0.1 % Crea 1 Applicator by Apply Externally route daily as needed. TYLENOL EXTRA STRENGTH 500 mg tablet Generic drug:  acetaminophen Take  by mouth every six (6) hours as needed for Pain. Introducing [facility] SERVICES! Dear Katt Tyson: 
Thank you for requesting a [employer] account. Our records indicate that you already have an active [employer] account. You can access your account anytime at [email or web address] Did you know that you can access your hospital and ER discharge instructions at any time in [employer]? You can also review all of your test results from your hospital stay or ER visit. Additional Information If you have questions, please visit the Frequently Asked Questions section of the [employer] website at [email or web address] Remember, [employer] is NOT to be used for urgent needs. For medical emergencies, dial 911. Now available from your iPhone and Android! Please provide this summary of care documentation to your next provider. Your primary care clinician is listed as Eleanor Mccoy. If you have any questions after today's visit, please call [phone].

## 2018-09-18 NOTE — PROGRESS NOTES
[facility] [address]  [address]  [facility], [address]  Phone: [phone]  Fax: [phone]    Vedia Lundborg  : 1940  PCP: Alejandro Henderson MD    PROGRESS NOTE    HISTORY OF PRESENT ILLNESS:  Chief Complaint   Patient presents with    Back Pain     Lower     Hip Pain     Right     Leg Pain     Right     Follow-up     Josefa Thacker is a [de-identified] y.o.  female with history of back pain. She has had PT and used a TENS unit in the past with benefit. At her last visit, she was to cont PT and given a rx for TENS unit. Today, she states she is here for 2 problems. The first is she has numbness and tingling in her right buttocks along with lumbar pain. She states down the right lateral side of her leg to her foot this will become numb. The second problem is that after PT, she has had increased pain. She feels it was helping but then they added to much exercises. It helps when she is doing it but the pain is increased after. She has been prescribed Cymbalta but is hesitant to start this. Denies bladder/bowel dysfunction, saddle paresthesia, weakness, gait disturbance, or other neurological deficit. Pt at this time desires to continue with current care/proceed with medication evaluation/proceed with PT. LMRI 3/6/18  IMPRESSION:       Multilevel degenerative changes, primarily moderate to advanced degenerative  facet arthropathy. No high-grade stenosis or neural compression. Location of pain: upper back and lower back  Does pain radiate into extremities: B/L buttock and BLE R>L  Does patient have weakness: Pt notes she has had some in R foot   Pt denies saddle paresthesias. Medications pt is on: Pt reports not taking Cymbalta because she is afraid of side effects. Pt denies recent ED visits or hospitalizations.  Denies persistent fevers, chills, weight changes, neurogenic bowel or bladder symptoms.      Treatments patient has tried:  Physical therapy:Yes, not aqua since insurance will not cover it  Non-opioid medications: Yes  Spinal injections: Unknown  Spinal surgery- No.          reviewed. Hx of renal cell carcinoma Pt reports she has a fallen arch and uses a brace to prevent it from turning excessively. This is a pt of Dr. Imelda Mora, has a Dx of fibromyalgia and chronic ankle pain. She also consults with Dr. Adonis Payton. ASSESSMENT  [de-identified] y.o. female with lumbar pain. Diagnoses and all orders for this visit:    1. DDD (degenerative disc disease), lumbar    2. Fibromyalgia       IMPRESSION/PLAN    1) Pt was given information on back care. 2) Continue PT/TENS. Verbalize pains to PT. She is having some right buttocks pain radiating down her leg. 3) Discussed a MDP, patient declined. Discussed Cymbalta. She will consider starting this. She is hesitate but is almost at the point where she needs to take a medication for hte nerve pain. She has a rx at home to start when she decides to. 4) Ms. Brian Chavira has a reminder for a \"due or due soon\" health maintenance. I have asked that she contact her primary care provider, Kwaku Dunne MD, for follow-up on this health maintenance. 5) We have informed patient to notify us for immediate appointment if he has any worsening neurogical symptoms or if an emergency situation presents, then call 911  5) Pt will follow-up in as scheduled for PT FU. Risks and benefits of ongoing opiate therapy have been reviewed with the patient.  is appropriate. PAST MEDICAL HISTORY  Past Medical History:   Diagnosis Date    Arthritis     Arthropathy, unspecified, site unspecified     Bruising     bruising and bleeding    Diverticulitis     Diverticulosis     Essential hypertension     Fibromyalgia     GERD (gastroesophageal reflux disease)     Hearing loss     Idiopathic sensory axonal neuropathy, dx EMG/NCV '17 4/15/2018    Obesity (BMI 30-39. 9)     Postoperative respiratory failure ([address] 9/30/10    Pre-diabetes 11/16 diet controlled    Renal cell cancer ([facility] [address]     Stage T1NxMx    Respiratory failure, post-operative (HCC)     Wears glasses         MEDICATIONS  Current Outpatient Prescriptions   Medication Sig Dispense Refill    loratadine (CLARITIN) 10 mg tablet Take 10 mg by mouth.  omeprazole (PRILOSEC) 40 mg capsule Take 40 mg by mouth daily.  traMADol-acetaminophen (ULTRACET) 37.5-325 mg per tablet Take 1 Tab by Mouth Every 6 Hours As Needed for Pain. No more than 8 tabs a day      fluticasone (FLONASE) 50 mcg/actuation nasal spray       metoprolol succinate (TOPROL-XL) 200 mg XL tablet Take 1 Tab by mouth daily. 90 Tab 2    doxazosin (CARDURA) 4 mg tablet Take 1 Tab by mouth nightly. 90 Tab 2    acetaminophen (TYLENOL EXTRA STRENGTH) 500 mg tablet Take  by mouth every six (6) hours as needed for Pain.  ACCU-CHEK SOFTCLIX LANCETS misc       ACCU-CHEK TAMY PLUS TEST STRP strip       aluminum hydrox-magnesium carb (GAVISCON)  mg/15 mL suspension Take 15 mL by mouth every six (6) hours as needed for Indigestion.  triamcinolone-emollient cmb#86 0.1 % crea 1 Applicator by Apply Externally route daily as needed.  amLODIPine (NORVASC) 10 mg tablet Take  by mouth daily.  Hydrochlorothiazide 12.5 mg tablet Take 12.5 mg by mouth every other day.  mirabegron ER (MYRBETRIQ) 50 mg ER tablet Take 1 Tab by mouth daily. 90 Tab 3    DULoxetine (CYMBALTA) 20 mg capsule Take 1 Cap by mouth daily.  (Patient taking differently: Take  by mouth daily.) 30 Cap 1       ALLERGIES  Allergies   Allergen Reactions    Aspirin Other (comments)    Codeine Other (comments)     Gi distress      Darvon [Propoxyphene] Rash and Itching     Gi distress    Dextromethorphan Other (comments)     Stomach cramps    Meperidine Hives     Gi distress    Morphine Other (comments)     Neurological reaction    Other Medication Palpitations     Muscle relaxants    Pcn [Penicillins] Swelling    Propoxyphene N-Acetaminophen Hives    Sulfa (Sulfonamide Antibiotics) Hives       SOCIAL HISTORY    Social History     Social History    Marital status:      Spouse name: N/A    Number of children: N/A    Years of education: N/A     Occupational History    Not on file. Social History Main Topics    Smoking status: Never Smoker    Smokeless tobacco: Never Used    Alcohol use No    Drug use: No    Sexual activity: Not on file     Other Topics Concern    Not on file     Social History Narrative       SUBJECTIVE      Pain Scale: 5/10    Pain Assessment  9/18/2018   Location of Pain Back;Leg   Pain Location Comment -   Location Modifiers Inferior;Right   Severity of Pain 7   Quality of Pain Sharp; Other (Comment)   Quality of Pain Comment Numbness right leg   Duration of Pain A few hours   Frequency of Pain Intermittent   Date Pain First Started -   Aggravating Factors Other (Comment)   Aggravating Factors Comment Sitting and laying down   Limiting Behavior -   Relieving Factors Exercises; Other (Comment)   Relieving Factors Comment Moving   Result of Injury No   Work-Related Injury -   Type of Injury -   Type of Injury Comment -       Accompanied by self. REVIEW OF SYSTEMS  ROS    Constitutional: Negative for fever, chills, or weight change. Respiratory: Negative for cough or shortness of breath. Cardiovascular: Negative for chest pain or palpitations. Gastrointestinal: Negative for acid reflux, change in bowel habits, or constipation. Genitourinary: Negative for incontinence, dysuria and flank pain. Musculoskeletal: Positive for lumbar pain. Skin: Negative for rash. Neurological: Negative for headaches, dizziness, or numbness. Endo/Heme/Allergies: Negative . Psychiatric/Behavioral: Negative.        PHYSICAL EXAMINATION  Visit Vitals    /73    Pulse 75    Temp 97.5 °F (36.4 °C)    Resp 17    Ht 5' 6\" (1.676 m)    Wt 233 lb 6.4 oz (105.9 kg)    SpO2 97%    BMI 37.67 kg/m2 Constitutional: Well developed,  well nourished,  awake, alert, and in no acute distress. Neurological:  Sensation to light touch is intact. Psychiatric: Affect and mood are appropriate. Integumentary: No rashes or abrasions noted on exposed areas,  warm, dry and intact. Cardiovascular/Peripheral Vascular:  No peripheral edema is noted. Lymphatic:  No evidence of lymphedema. No cervical lymphadenopathy. SPINE/MUSCULOSKELETAL EXAM    Lumbar spine:  No rash, ecchymosis, or gross obliquity. No fasciculations. No focal atrophy is noted. Range of motion is intact. Tenderness to palpation to lumbar. SI jointstender. Trochanters non tender. Musculoskeletal:  No pain with extension, axial loading, or forward flexion. No pain with internal or external rotation of her hips. MOTOR     Hip Flex  Quads Hamstrings Ankle DF EHL Ankle PF   Right +4/5 +4/5 +4/5 +4/5 +4/5 +4/5   Left +4/5 +4/5 +4/5 +4/5 +4/5 +4/5       Straight Leg raise negative bilaterally. normal gait and station    Ambulation without assistive device. full weight bearing, non-antalgic gait.     Luna Le, TACHO

## 2018-10-04 ENCOUNTER — TELEPHONE (OUTPATIENT)
Dept: ORTHOPEDIC SURGERY | Age: 78
End: 2018-10-04

## 2018-10-04 NOTE — TELEPHONE ENCOUNTER
Patient called to discuss some issues with Dr. Etienne Infante office. She has to cancel today's appt due to trans issues and has not been r/s until 11/14 - she does not want to see an NP. Patient is concerned because she thinks the p/t she's doing may be causing other issues. Patient is requesting a call back today after 3:30 if possible to further discuss.

## 2018-10-04 NOTE — TELEPHONE ENCOUNTER
She last saw Leslie Walter who documented \"after PT, she has had increased pain. She feels it was helping but then they added to much exercises. It helps when she is doing it but the pain is increased after\"  It is unlikely that PT is doing damage and it is not unusually to have some pain afterwards as she is using muscles she likely has not used in quite some time.   She should discuss with the physical therapist her concerns so they can adjust her program.

## 2018-10-04 NOTE — TELEPHONE ENCOUNTER
Spoke with patient daughter, stated that patient would have to call back. Patient was busy at the moment.

## 2018-10-05 NOTE — TELEPHONE ENCOUNTER
We will not be prescribing pain medication. I can recommend OTC Tylenol 500 mg for pain PRN. She can also start the Cymbalta as we discussed.  This can also help with pain;

## 2018-10-05 NOTE — TELEPHONE ENCOUNTER
SPOKE TO PT TO RELAY MESSAGE ABOUT ALTERING THERAPY ROUTINE PER LB MESSAGE IN CC. PT IS REQUESTING SOME SORT OF PAIN MEDICATION TO HELP WITH AFTER HER THERAPY.  PLEASE CALL PT TO ADVISE

## 2018-10-19 ENCOUNTER — TELEPHONE (OUTPATIENT)
Dept: ORTHOPEDIC SURGERY | Age: 78
End: 2018-10-19

## 2018-10-19 NOTE — TELEPHONE ENCOUNTER
If she feels PT is making her pain worse or triggering new issues, she can stop PT. I would recommend she continue a HEP.

## 2018-10-19 NOTE — TELEPHONE ENCOUNTER
Mrs. Sulaiman Nye is calling and asking to speak Dr. Karen Singh about  her therapy and she is having other medical issues a lot more pain when she is having the therapy. Patient as spoke with the therapist about the issues and they are working with her on the issues.  Patient can be reached at [phone]

## 2018-10-19 NOTE — TELEPHONE ENCOUNTER
Spoke with patient, she stated that every time she goes to Physical Therapy for her back it seems to trigger a new problem. She stated the last time she went it presented knee problems. Also whenever she goes, it has her down for 2-3 days where she cannot even function. She states she expects pain after any PT but this is not normal pain. Patient would just like to know is this normal, and should she continue going to PT even though it is triggering other issues? Please advise.

## 2018-10-23 ENCOUNTER — OFFICE VISIT (OUTPATIENT)
Dept: ORTHOPEDIC SURGERY | Age: 78
End: 2018-10-23

## 2018-10-23 VITALS
SYSTOLIC BLOOD PRESSURE: 158 MMHG | RESPIRATION RATE: 16 BRPM | HEART RATE: 73 BPM | TEMPERATURE: 97.2 F | BODY MASS INDEX: 38.09 KG/M2 | WEIGHT: 237 LBS | HEIGHT: 66 IN | DIASTOLIC BLOOD PRESSURE: 86 MMHG | OXYGEN SATURATION: 97 %

## 2018-10-23 DIAGNOSIS — G89.29 CHRONIC PAIN OF RIGHT ANKLE: ICD-10-CM

## 2018-10-23 DIAGNOSIS — M76.821 POSTERIOR TIBIAL TENDINITIS OF RIGHT LOWER EXTREMITY: Primary | ICD-10-CM

## 2018-10-23 DIAGNOSIS — M79.671 RIGHT FOOT PAIN: ICD-10-CM

## 2018-10-23 DIAGNOSIS — M25.571 CHRONIC PAIN OF RIGHT ANKLE: ICD-10-CM

## 2018-10-23 NOTE — PROGRESS NOTES
Verbal order given by Dr. Chris Gunderson to sign order for visco heel  entered by [facility].

## 2018-10-23 NOTE — PATIENT INSTRUCTIONS
Please follow up in 5 weeks. You are advised to contact us if your condition worsens. You have been provided with an order for durable medical equipment that you may  at an outside facility as our office does not carry the equipment you need. You may pick it up at any medical supply company you like. Listed below are a few different locations for your convenience:    [address]  [address], [address]  Phone: [phone] [address] [facility],[address]  [facility], [address]  Phone: [phone]  Phone: [phone]  [place]:   [address] [place], [address] [place]:  [address] [facility] [address]  [place]:  [facility],[address] [place], [address]        Heel Pain: Care Instructions  Your Care Instructions  You can have heel pain from an injury or from everyday overuse, such as running or walking a lot. Plantar fasciitis is the most common cause of heel pain. In this condition, the bottom of your foot from the front of the heel to the base of the toes is sore and hard to walk on. Your heel can get better with rest, anti-inflammatory pain medicines, and stretching exercises. Follow-up care is a key part of your treatment and safety. Be sure to make and go to all appointments, and call your doctor if you are having problems. It's also a good idea to know your test results and keep a list of the medicines you take. How can you care for yourself at home? · Rest your feet often. Reduce your activity to a level that lets you avoid pain. If possible, do not run or walk on hard surfaces. · Take anti-inflammatory medicines to reduce heel pain. These include ibuprofen (Advil, Motrin) and naproxen (Aleve). Read and follow all instructions on the label. · Put ice or a cold pack on your heel for 10 to 20 minutes at a time.  Try to do this every 1 to 2 hours for the next 3 days (when you are awake). Put a thin cloth between the ice and your skin. · If ice isn't helping after 2 or 3 days, try heat, such as a heating pad set on low. · If your doctor says it is okay, try these calf stretches. Tight calf muscles can cause heel pain or make it worse. ? Stand about 1 foot from a wall. Place the palms of both hands against the wall at chest level and lean forward against the wall. Put the leg you want to stretch about a step behind your other leg. Keep your back heel on the floor and bend your front knee until you feel a stretch in the back leg. Hold this position for 15 to 30 seconds. Repeat the exercise 2 to 4 times a session. Do 3 to 4 sessions a day. ? Sit down on the floor or a mat with your feet stretched in front of you. Roll up a towel lengthwise, and loop it over the ball of your foot. Holding the towel at both ends, gently pull the towel toward you to stretch your foot. Hold this position for 15 to 30 seconds. Repeat the exercise 2 to 4 times a session. Do 3 to 4 sessions a day. · Wear a night splint if your doctor suggests it. A night splint holds your foot with the toes pointed up. This position gives the bottom of your foot a constant, gentle stretch. · Wear shoes with good arch support. Athletic shoes or shoes with a well-cushioned sole are good choices. · Try a heel lift, heel cup or shoe insert (orthotic) to help cushion your heel. You can buy these at many shoe stores. Use them in both shoes, even if only one foot hurts. · Maintain a healthy weight. This puts less strain on your feet. When should you call for help?   Call your doctor now or seek immediate medical care if:    · You have heel pain with fever, redness, or warmth in your heel.     · You have numbness or tingling in your heel.    Watch closely for changes in your health, and be sure to contact your doctor if:    · You cannot put weight on the sore foot.     · Your heel pain lasts more than 2 weeks. Where can you learn more? Go to http://angie-dayanara.info/. Enter S299 in the search box to learn more about \"Heel Pain: Care Instructions. \"  Current as of: November 20, 2017  Content Version: 11.8  © 7934-6497 [employer]. Care instructions adapted under license by [employer] (which disclaims liability or warranty for this information). If you have questions about a medical condition or this instruction, always ask your healthcare professional. [address] any warranty or liability for your use of this information.

## 2018-12-11 ENCOUNTER — OFFICE VISIT (OUTPATIENT)
Dept: ORTHOPEDIC SURGERY | Age: 78
End: 2018-12-11

## 2018-12-11 VITALS
WEIGHT: 236.4 LBS | BODY MASS INDEX: 37.99 KG/M2 | DIASTOLIC BLOOD PRESSURE: 62 MMHG | SYSTOLIC BLOOD PRESSURE: 136 MMHG | HEART RATE: 68 BPM | HEIGHT: 66 IN | TEMPERATURE: 96.6 F | RESPIRATION RATE: 12 BRPM | OXYGEN SATURATION: 97 %

## 2018-12-11 DIAGNOSIS — M72.2 PLANTAR FASCIITIS, RIGHT: ICD-10-CM

## 2018-12-11 DIAGNOSIS — M79.671 RIGHT FOOT PAIN: Primary | ICD-10-CM

## 2018-12-11 NOTE — PROGRESS NOTES
AMBULATORY PROGRESS NOTE      Patient: David Gupta             MRN: [id number]     SSN: [id number] Body mass index is 38.16 kg/m². YOB: 1940     AGE: [de-identified] y.o. EX: female    PCP: Noemy Dhaliwal MD     IMPRESSION/DIAGNOSIS AND TREATMENT PLAN     DIAGNOSES  1. Right foot pain    2. Plantar fasciitis, right        Orders Placed This Encounter    [address] understands her diagnoses and the proposed plan. As such, my impression is an 49-year-old female with planovalgus alignment. She has been using a hinged, AFO-type brace. She does not have it today, however. She has tenderness to the lateral plantar portion of her calcaneus. Recommendation is for offloading the central portion of her calcaneus and the lateral portion of the calcaneus. The plan is listed as below. Plan:    1) DME Order: Visco heel inserts. 2) Anticipate MRI or bone scan if condition does not improve. 3) Continue activity modification as directed. RTO - 3 weeks, January 3rd. HPI Esdras Herron IS A [de-identified] y.o. female who presents to my outpatient office for follow up of posterior tibial tendinitis of the RLE. At the last visit, I provided an order for Visco heel inserts and to modify the hinged AFO to offload the heel, instructed the patient to continue activity modification as directed, and to continue using the AFO as directed. Since we saw her last, Ms. Rashad Coffman states that she is not experiencing pain in her right medial hindfoot/ankle anymore, however, she has been experiencing pain in her right plantar lateral hindfoot and central heel for the past few weeks. She notes that when she first gets up, she experiences the pain and finds herself \"hobbling\" around and has difficulty bearing weight due to pain. However, after walking for a bit, her pain eases.  She notes that it only hurts when she first stands to walk and reports that she does not experience pain at night. Ms. Nola Martinez states she has h/o fibromyalgia and must sleep in a recliner. She has noticed that when she wakes in the recliner, her feet are resting on the foot rest. She inquires whether this could be causing her pain. Visit Vitals  /62   Pulse 68   Temp 96.6 °F (35.9 °C) (Oral)   Resp 12   Ht 5' 6\" (1.676 m)   Wt 236 lb 6.4 oz (107.2 kg)   SpO2 97%   BMI 38.16 kg/m²      ANKLE/FOOT right     Psychiatry: Alert, Oriented x 3; Speech normal in context and clarity,                       Memory intact grossly, no involuntary movements - tremors, no dementia  Gait: Normal  Tenderness: NT to the medial hindfoot. Mild tenderness to the plantar lateral hindfoot. Mild tenderness to the lateral heel. Mild tenderness to the central heel. Negative squeeze test.  Cutaneous:  WNL. Joint Motion: WNL. Joint / Tendon Stability: No Ankle or Subtalar instability or joint laxity. No peroneal sublux ability or dislocation  Alignment: Pes Planus alignment                          Mild pronation               Neuro Motor/Sensory: NL/NL. Vascular: NL foot/ankle pulses. Lymphatics: No extremity lymphedema, No calf swelling, no tenderness to calf muscles. 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 9)     Postoperative respiratory failure ([address] 9/30/10    Pre-diabetes     11/16 diet controlled    Renal cell cancer ([address]     Stage T1NxMx    Respiratory failure, post-operative ([address]     Wears glasses      Current Outpatient Medications   Medication Sig    mirabegron ER (MYRBETRIQ) 50 mg ER tablet Take 1 Tab by mouth daily.  loratadine (CLARITIN) 10 mg tablet Take 10 mg by mouth.  omeprazole (PRILOSEC) 40 mg capsule Take 40 mg by mouth daily.  traMADol-acetaminophen (ULTRACET) 37.5-325 mg per tablet Take 1 Tab by Mouth Every 6 Hours As Needed for Pain. No more than 8 tabs a day    fluticasone (FLONASE) 50 mcg/actuation nasal spray     DULoxetine (CYMBALTA) 20 mg capsule Take 1 Cap by mouth daily. (Patient taking differently: Take  by mouth daily.)    metoprolol succinate (TOPROL-XL) 200 mg XL tablet Take 1 Tab by mouth daily.  doxazosin (CARDURA) 4 mg tablet Take 1 Tab by mouth nightly.  acetaminophen (TYLENOL EXTRA STRENGTH) 500 mg tablet Take  by mouth every six (6) hours as needed for Pain.  ACCU-CHEK SOFTCLIX LANCETS misc     ACCU-CHEK TAMY PLUS TEST STRP strip     aluminum hydrox-magnesium carb (GAVISCON)  mg/15 mL suspension Take 15 mL by mouth every six (6) hours as needed for Indigestion.  triamcinolone-emollient cmb#86 0.1 % crea 1 Applicator by Apply Externally route daily as needed.  amLODIPine (NORVASC) 10 mg tablet Take  by mouth daily.  Hydrochlorothiazide 12.5 mg tablet Take 12.5 mg by mouth every other day. No current facility-administered medications for this visit.       Allergies   Allergen Reactions    Aspirin Other (comments)    Codeine Other (comments)     Gi distress      Darvon [Propoxyphene] Rash and Itching     Gi distress    Dextromethorphan Other (comments)     Stomach cramps    Meperidine Hives     Gi distress    Morphine Other (comments)     Neurological reaction    Other Medication Palpitations     Muscle relaxants    Pcn [Penicillins] Swelling    Propoxyphene N-Acetaminophen Hives    Sulfa (Sulfonamide Antibiotics) Hives     Past Surgical History:   Procedure Laterality Date    HX BREAST BIOPSY      HX BREAST REDUCTION      HX CATARACT REMOVAL      HX CHOLECYSTECTOMY      HX HERNIA REPAIR  HX HYSTERECTOMY  1984     total vaginal    HX OTHER SURGICAL  9/2010    SIGMOIDECTOMY    HX VEIN STRIPPING       Social History     Occupational History    Not on file   Tobacco Use    Smoking status: Never Smoker    Smokeless tobacco: Never Used   Substance and Sexual Activity    Alcohol use: No    Drug use: No    Sexual activity: Not on file     Family History   Problem Relation Age of Onset    Heart Attack Mother 79    Diabetes Other     Hypertension Other     Arthritis-osteo Other     Heart Disease Other     Heart Attack Sister 71        REVIEW OF SYSTEMS : 12/11/2018  ALL BELOW ARE Negative except : SEE HPI     Constitutional: Negative for fever, chills and weight loss. Neg Weight Loss  Cardiovascular: Negative for chest pain, claudication and leg swelling. SOB, BAIG   Gastrointestinal/Urological: Negative for  pain, N/V/D/C, Blood in stool or urine,dysuria                         Hematuria, Incontinence, pelvic pain  Musculoskeletal: see HPI. Neurological: Negative for dizziness and weakness, headaches,Visual Changes             Confusion,  Or Seizures,   Psychiatric/Behavioral: Negative for depression, memory loss and substance abuse. Extremities:  Negative for hair changes, rash or skin lesion changes. Hematologic: Negative for Bleeding problems, bruising, pallor or swollen lymph nodes. Peripheral Vascular: No calf pain, vascular vein tenderness to calf pain              No calf throbbing, posterior knee throbbing pain     DIAGNOSTIC IMAGING     No notes on file    Please see above section of this report. I have personally reviewed the results of the above study. The interpretation of this study is my professional opinion. Written by Catalino Lombardo, as dictated by Dr. Jose Martin Carreno. I, Dr. Jose Martin Carreno, confirm that all documentation is accurate.

## 2018-12-11 NOTE — PATIENT INSTRUCTIONS
Please follow up in 3 weeks. You are advised to contact us if your condition worsens. Heel Pain: Care Instructions  Your Care Instructions  You can have heel pain from an injury or from everyday overuse, such as running or walking a lot. Plantar fasciitis is the most common cause of heel pain. In this condition, the bottom of your foot from the front of the heel to the base of the toes is sore and hard to walk on. Your heel can get better with rest, anti-inflammatory pain medicines, and stretching exercises. Follow-up care is a key part of your treatment and safety. Be sure to make and go to all appointments, and call your doctor if you are having problems. It's also a good idea to know your test results and keep a list of the medicines you take. How can you care for yourself at home? · Rest your feet often. Reduce your activity to a level that lets you avoid pain. If possible, do not run or walk on hard surfaces. · Take anti-inflammatory medicines to reduce heel pain. These include ibuprofen (Advil, Motrin) and naproxen (Aleve). Read and follow all instructions on the label. · Put ice or a cold pack on your heel for 10 to 20 minutes at a time. Try to do this every 1 to 2 hours for the next 3 days (when you are awake). Put a thin cloth between the ice and your skin. · If ice isn't helping after 2 or 3 days, try heat, such as a heating pad set on low. · If your doctor says it is okay, try these calf stretches. Tight calf muscles can cause heel pain or make it worse. ? Stand about 1 foot from a wall. Place the palms of both hands against the wall at chest level and lean forward against the wall. Put the leg you want to stretch about a step behind your other leg. Keep your back heel on the floor and bend your front knee until you feel a stretch in the back leg. Hold this position for 15 to 30 seconds. Repeat the exercise 2 to 4 times a session. Do 3 to 4 sessions a day.   ? Sit down on the floor or a mat with your feet stretched in front of you. Roll up a towel lengthwise, and loop it over the ball of your foot. Holding the towel at both ends, gently pull the towel toward you to stretch your foot. Hold this position for 15 to 30 seconds. Repeat the exercise 2 to 4 times a session. Do 3 to 4 sessions a day. · Wear a night splint if your doctor suggests it. A night splint holds your foot with the toes pointed up. This position gives the bottom of your foot a constant, gentle stretch. · Wear shoes with good arch support. Athletic shoes or shoes with a well-cushioned sole are good choices. · Try a heel lift, heel cup or shoe insert (orthotic) to help cushion your heel. You can buy these at many shoe stores. Use them in both shoes, even if only one foot hurts. · Maintain a healthy weight. This puts less strain on your feet. When should you call for help? Call your doctor now or seek immediate medical care if:    · You have heel pain with fever, redness, or warmth in your heel.     · You have numbness or tingling in your heel.    Watch closely for changes in your health, and be sure to contact your doctor if:    · You cannot put weight on the sore foot.     · Your heel pain lasts more than 2 weeks. Where can you learn more? Go to http://angie-dayanara.info/. Enter S299 in the search box to learn more about \"Heel Pain: Care Instructions. \"  Current as of: November 20, 2017  Content Version: 11.8  © 8113-8267 [employer]. Care instructions adapted under license by [employer] (which disclaims liability or warranty for this information). If you have questions about a medical condition or this instruction, always ask your healthcare professional. [address] any warranty or liability for your use of this information.

## 2018-12-11 NOTE — PROGRESS NOTES
1. Have you been to the ER, urgent care clinic since your last visit? Hospitalized since your last visit? Yes [facility]    2. Have you seen or consulted any other health care providers outside of the [address] [place] [address] since your last visit? Include any pap smears or colon screening.  Yes When: SINUS INFECTION [facility] OCTOBER 2018

## 2018-12-18 ENCOUNTER — OFFICE VISIT (OUTPATIENT)
Dept: ORTHOPEDIC SURGERY | Age: 78
End: 2018-12-18

## 2018-12-18 VITALS
BODY MASS INDEX: 38.28 KG/M2 | TEMPERATURE: 95.5 F | HEIGHT: 66 IN | SYSTOLIC BLOOD PRESSURE: 171 MMHG | WEIGHT: 238.2 LBS | OXYGEN SATURATION: 100 % | RESPIRATION RATE: 15 BRPM | DIASTOLIC BLOOD PRESSURE: 81 MMHG | HEART RATE: 66 BPM

## 2018-12-18 DIAGNOSIS — M25.562 LEFT KNEE PAIN, UNSPECIFIED CHRONICITY: ICD-10-CM

## 2018-12-18 DIAGNOSIS — R52 PAIN: ICD-10-CM

## 2018-12-18 DIAGNOSIS — M17.12 PRIMARY OSTEOARTHRITIS OF LEFT KNEE: Primary | ICD-10-CM

## 2018-12-18 RX ORDER — TRIAMCINOLONE ACETONIDE 40 MG/ML
40 INJECTION, SUSPENSION INTRA-ARTICULAR; INTRAMUSCULAR ONCE
Qty: 1 ML | Refills: 0
Start: 2018-12-18 | End: 2018-12-18

## 2018-12-18 NOTE — PATIENT INSTRUCTIONS
Aftercare Instructions following your Cortisone Injection:    Your cortisone injection today included both a pain reliever and a steroid. You can expect the pain to begin to improve in the next 30-45 mins secondary to the pain reliever. The steroid medicine itself generally takes up to 48 hours to being to decrease the inflammation. We recommend to avoid strenuous activities on the day you get the injection. The following day you may gradually increase your activities as tolerated. NOTHING should cause an increase in pain or swelling    Rarely the following side effects can occur:  1. Flushing or redness - this is a normal reaction and will subside within a day. Benedryl and tylenol can help decrease the symptoms  2. Acute increase in pain - this is also something normal that can occur day of the injection. If the pain occures, you can put ice or a cold pack on the painful area for 10 to 20 minutes at a time. Put a thin cloth between the ice and your skin. 3. If you are a diabetic a cortisone injection can cause your glucose (sugar) to rise. Please monitor your sugar closely for 2 days following the injection. If your sugar is elevated beyond your normal please contact your PCP immediately. We recommend following back up with Orthopedics in 3 weeks following your injection unless your provider instructed differently.

## 2018-12-18 NOTE — PROGRESS NOTES
1. Have you been to the ER, urgent care clinic since your last visit? Hospitalized since your last visit? No    2. Have you seen or consulted any other health care providers outside of the [address] [place] [address] since your last visit? Include any pap smears or colon screening.  No

## 2018-12-18 NOTE — PROGRESS NOTES
Seamus Patel  1940   Chief Complaint   Patient presents with    Knee Pain     LEFT KNEE PAIN        HISTORY OF PRESENT ILLNESS  Seamus Patel is a [de-identified] y.o. female who presents today for evaluation of left knee pain. She rates her pain 3/10 today. Pain has been present for 1 month after bending her knee wrong. The pain has improved since the incident. She has had previous cortisone injections years ago. Patient describes the pain as aching, sharp and dull that is Constant in nature. Symptoms are worse with prolong walking and standing, Activity and is better with  Rest, Elevation. Associated symptoms include Swelling. Since problem started, it: has improved. Pain does not wake patient up at night. Has taken no meds for the problem. Has tried following treatments: Injections:YES; Brace:NO; Therapy:NO; Cane/Crutch:NO       Allergies   Allergen Reactions    Aspirin Other (comments)    Codeine Other (comments)     Gi distress      Darvon [Propoxyphene] Rash and Itching     Gi distress    Dextromethorphan Other (comments)     Stomach cramps    Meperidine Hives     Gi distress    Morphine Other (comments)     Neurological reaction    Other Medication Palpitations     Muscle relaxants    Pcn [Penicillins] Swelling    Propoxyphene N-Acetaminophen Hives    Sulfa (Sulfonamide Antibiotics) Hives        Past Medical History:   Diagnosis Date    Arthritis     Arthropathy, unspecified, site unspecified     Bruising     bruising and bleeding    Diverticulitis     Diverticulosis     Essential hypertension     Fibromyalgia     GERD (gastroesophageal reflux disease)     Hearing loss     Idiopathic sensory axonal neuropathy, dx EMG/NCV '17 4/15/2018    Obesity (BMI 30-39. 9)     Postoperative respiratory failure ([address] 9/30/10    Pre-diabetes     11/16 diet controlled    Renal cell cancer ([address]     Stage T1NxMx    Respiratory failure, post-operative ([address]     Wears glasses Social History     Socioeconomic History    Marital status:      Spouse name: Not on file    Number of children: Not on file    Years of education: Not on file    Highest education level: Not on file   Social Needs    Financial resource strain: Not on file    Food insecurity - worry: Not on file    Food insecurity - inability: Not on file   [employer] needs - medical: Not on file   [employer] needs - non-medical: Not on file   Occupational History    Not on file   Tobacco Use    Smoking status: Never Smoker    Smokeless tobacco: Never Used   Substance and Sexual Activity    Alcohol use: No    Drug use: No    Sexual activity: Not on file   Other Topics Concern    Not on file   Social History Narrative    Not on file      Past Surgical History:   Procedure Laterality Date    HX BREAST BIOPSY      HX BREAST REDUCTION      HX CATARACT REMOVAL      HX CHOLECYSTECTOMY      HX HERNIA REPAIR      HX HYSTERECTOMY  1984     total vaginal    HX OTHER SURGICAL  9/2010    SIGMOIDECTOMY    HX VEIN STRIPPING        Family History   Problem Relation Age of Onset    Heart Attack Mother 79    Diabetes Other     Hypertension Other     Arthritis-osteo Other     Heart Disease Other     Heart Attack Sister 71      Current Outpatient Medications   Medication Sig    mirabegron ER (MYRBETRIQ) 50 mg ER tablet Take 1 Tab by mouth daily.  loratadine (CLARITIN) 10 mg tablet Take 10 mg by mouth.  omeprazole (PRILOSEC) 40 mg capsule Take 40 mg by mouth daily.  traMADol-acetaminophen (ULTRACET) 37.5-325 mg per tablet Take 1 Tab by Mouth Every 6 Hours As Needed for Pain. No more than 8 tabs a day    fluticasone (FLONASE) 50 mcg/actuation nasal spray     DULoxetine (CYMBALTA) 20 mg capsule Take 1 Cap by mouth daily. (Patient taking differently: Take  by mouth daily.)    metoprolol succinate (TOPROL-XL) 200 mg XL tablet Take 1 Tab by mouth daily.     doxazosin (CARDURA) 4 mg tablet Take 1 Tab by mouth nightly.  acetaminophen (TYLENOL EXTRA STRENGTH) 500 mg tablet Take  by mouth every six (6) hours as needed for Pain.  ACCU-CHEK SOFTCLIX LANCETS [facility] [place]     ACCU-CHEK TAMY PLUS TEST STRP strip     aluminum hydrox-magnesium carb (GAVISCON)  mg/15 mL suspension Take 15 mL by mouth every six (6) hours as needed for Indigestion.  triamcinolone-emollient cmb#86 0.1 % crea 1 Applicator by Apply Externally route daily as needed.  amLODIPine (NORVASC) 10 mg tablet Take  by mouth daily.  Hydrochlorothiazide 12.5 mg tablet Take 12.5 mg by mouth every other day. No current facility-administered medications for this visit. REVIEW OF SYSTEM   Patient denies: Weight loss, Fever/Chills, HA, Visual changes, Fatigue, Chest pain, SOB, Abdominal pain, N/V/D/C, Blood in stool or urine, Edema. Pertinent positive as above in HPI. All others were negative    PHYSICAL EXAM:   Visit Vitals  /81   Pulse 66   Temp 95.5 °F (35.3 °C) (Oral)   Resp 15   Ht 5' 6\" (1.676 m)   Wt 238 lb 3.2 oz (108 kg)   SpO2 100%   BMI 38.45 kg/m²     The patient is a well-developed, well-nourished female   in no acute distress. The patient is alert and oriented times three. The patient is alert and oriented times three. Mood and affect are normal.  LYMPHATIC: lymph nodes are not enlarged and are within normal limits  SKIN: normal in color and non tender to palpation. There are no bruises or abrasions noted. NEUROLOGICAL: Motor sensory exam is within normal limits. Reflexes are equal bilaterally.  There is normal sensation to pinprick and light touch  MUSCULOSKELETAL:  Examination Left knee   Skin Intact   Range of motion    Effusion +   Medial joint line tenderness +   Lateral joint line tenderness +   Tenderness Pes Bursa -   Tenderness insertion MCL -   Tenderness insertion LCL -   Charlas -   Patella crepitus +   Patella grind -   Lachman -   Pivot shift -   Anterior drawer -   Posterior drawer -   Varus stress -   Valgus stress -   Neurovascular Intact   Calf Swelling and Tenderness to Palpation -   Randal's Test -   Hamstring Cord Tightness -       PROCEDURE: After sterile prep, 4 cc of Xylocaine and 1 cc of Kenalog were injected into the left knee. [facility]  OFFICE PROCEDURE PROGRESS NOTE        Chart reviewed for the following:  Arnaldo Martin MD, have reviewed the History, Physical and updated the Allergic reactions for [address] [place] [address] [place] performed immediately prior to start of procedure:  Arnaldo Martin MD, have performed the following reviews on [facility] prior to the start of the procedure:            * Patient was identified by name and date of birth   * Agreement on procedure being performed was verified  * Risks and Benefits explained to the patient  * Procedure site verified and marked as necessary  * Patient was positioned for comfort  * Consent was signed and verified     Time: 11:48 AM    Date of procedure: 12/18/2018    Procedure performed by:  Adalid Larry MD    Provider assisted by: (see medication administration)    How tolerated by patient: tolerated the procedure well with no complications    Comments: none    IMAGING: XR of left knee dated 12/18/18 was reviewed and read: marked tri compartment degenerative arthritis. IMPRESSION:      ICD-10-CM ICD-9-CM    1. Primary osteoarthritis of left knee M17.12 715.16 TRIAMCINOLONE ACETONIDE INJ      triamcinolone acetonide (KENALOG) 40 mg/mL injection      DRAIN/INJECT LARGE JOINT/BURSA   2. Pain R52 780.96    3. Left knee pain, unspecified chronicity M25.562 719.46 AMB POC XRAY, KNEE; 1/2 VIEWS      TRIAMCINOLONE ACETONIDE INJ      triamcinolone acetonide (KENALOG) 40 mg/mL injection      DRAIN/INJECT LARGE JOINT/BURSA        PLAN:  1.  The patient presents today with left knee pain due to osteoarthritis and I am hopeful an injection today will help. Risk factors include: htn  2. No ultrasound exam indicated today  3. Yes cortisone injection indicated today L KNEE   4. No Physical/Occupational Therapy indicated today  5. No diagnostic test indicated today:   6. No durable medical equipment indicated today  7. No referral indicated today   8. No medications indicated today:   9. No Narcotic indicated today       RTC 3 weeks if pain continues  Follow-up Disposition: Not on File    Scribed by Racquel Herrera [address]) as dictated by Oscar Esquivel MD    I, Dr. Oscar Esquivel, confirm that all documentation is accurate.     Oscar Esquivel M.D.   [facility]

## 2019-01-10 ENCOUNTER — OFFICE VISIT (OUTPATIENT)
Dept: ORTHOPEDIC SURGERY | Age: 79
End: 2019-01-10

## 2019-01-10 ENCOUNTER — TELEPHONE (OUTPATIENT)
Dept: ORTHOPEDIC SURGERY | Age: 79
End: 2019-01-10

## 2019-01-10 VITALS
TEMPERATURE: 97.4 F | HEART RATE: 72 BPM | RESPIRATION RATE: 16 BRPM | SYSTOLIC BLOOD PRESSURE: 176 MMHG | OXYGEN SATURATION: 97 % | DIASTOLIC BLOOD PRESSURE: 88 MMHG

## 2019-01-10 DIAGNOSIS — M54.6 CHRONIC LEFT-SIDED THORACIC BACK PAIN: Primary | ICD-10-CM

## 2019-01-10 DIAGNOSIS — G89.29 CHRONIC LEFT-SIDED THORACIC BACK PAIN: Primary | ICD-10-CM

## 2019-01-10 DIAGNOSIS — M79.7 FIBROMYALGIA: ICD-10-CM

## 2019-01-10 DIAGNOSIS — M47.816 LUMBAR FACET ARTHROPATHY: ICD-10-CM

## 2019-01-10 DIAGNOSIS — G89.18 POST-MASTECTOMY PAIN: ICD-10-CM

## 2019-01-10 RX ORDER — METHOCARBAMOL 500 MG/1
TABLET, FILM COATED ORAL
Qty: 60 TAB | Refills: 1 | Status: SHIPPED | OUTPATIENT
Start: 2019-01-10 | End: 2019-01-14

## 2019-01-10 RX ORDER — SENNOSIDES 25 MG/1
TABLET, FILM COATED ORAL 4 TIMES DAILY
Qty: 100 G | Refills: 2 | Status: SHIPPED | OUTPATIENT
Start: 2019-01-10 | End: 2019-06-17

## 2019-01-10 NOTE — TELEPHONE ENCOUNTER
[employer] called stating the Robaxin need prior auth. They are wanting to know if they can prescribe Baclofen, Zanaflex. Also states the lidocaine only comes in ointment, can script be changed from cream to ointment and also needs to know how many grams per day or how many days it needs to last.  Please call Pharmacist at [phone].

## 2019-01-10 NOTE — TELEPHONE ENCOUNTER
-Spoke with Courtenay Gowers at [facility] and gave verbal per Alissa Ivey NP to switch to ointment and 2g/day.    -prior authorization sent per message below and waiting approval/denial.    -called patient and verified name and . Patient was informed that prior authorization was sent for medication prescribed and waiting approval/denial which may take 1 to 5 business days. Patient verbalized understanding.

## 2019-01-10 NOTE — PROGRESS NOTES
[address]  [address]  [place], [facility] [address]  Phone: [phone]  Fax: [phone]        Adis Diamond  : 1940  PCP: Eleanor Andrea MD    PROGRESS NOTE      ASSESSMENT AND PLAN    Diagnoses and all orders for this visit:    1. Chronic left-sided thoracic back pain    2. Lumbar facet arthropathy, MRI '18    3. Post-mastectomy pain, left    4. Fibromyalgia    Other orders  -     lidocaine 5 % topical cream; Apply  to affected area four (4) times daily. -     methocarbamol (ROBAXIN) 500 mg tablet; Take one-half to 1 tab po every day to BID as needed for pain/spasms       1. Advised to continue HEP every other day at least.  2. Trial of Lidocaine cream  3. Trial of Robaxin  4. Possible lumbar RF if focal lumbar symptoms    Follow-up Disposition:  Return in about 6 weeks (around 2019). HISTORY OF PRESENT ILLNESS  Leslye Mackey is a [de-identified] y.o. female. Pt presents to the office for a f/u visit for low back pain. Last OV discussed Cymbalta and continued PT. She reports trying Cymbalta and had Chest Pain. Pt reports she does HEP regularly, but not when she will be more active that day. Pt c/o mid back pain radiating into L breast. She notes her pain follows her surgery scar in L breast and she can massage it. She notes wearing a bra aggravates this pain. Pt states this pain has been ongoing. She c/o low back pain with RLE pain. She reports aggravation with standing and walking for RLE pain and numbness. Pt states she has not slept in bed for several years, she is on a recliner. She inquires if this method of sleeping aggravates her mid back pain. Location of pain: mid back into L breast burning pain. Low back. Does pain radiate into extremities: paresthesias R buttock to foot. BLE R>L. L rib cage and breast.  Does patient have weakness: R foot   Pt denies saddle paresthesias. Medications pt is on: Tylenol QHS with benefit. Tramadol 50mg PRN, ran out. Pt denies recent ED visits or hospitalizations. Denies persistent fevers, chills, weight changes, neurogenic bowel or bladder symptoms. Treatments patient has tried:  Physical therapy:Yes low back  Doing HEP: Yes  Non-opioid medications: Yes Failed Cymbalta  Spinal injections: Unknown  Spinal surgery- No.   Last L MRI '18: Multilevel degenerative changes. No high-grade stenosis     reviewed. Hx of renal cell carcinoma. Pt reports she has a fallen arch and uses a brace to prevent it from turning excessively. This is a pt of Dr. Liat Leal, has a Dx of fibromyalgia and chronic ankle pain. She also consults with Dr. Philip Gipson. Pain Assessment  1/10/2019   Location of Pain Back   Pain Location Comment -   Location Modifiers Medial   Severity of Pain 3   Quality of Pain Dull;Burning   Quality of Pain Comment -   Duration of Pain Persistent   Frequency of Pain Constant   Date Pain First Started -   Aggravating Factors Other (Comment)   Aggravating Factors Comment Wearing bra   Limiting Behavior -   Relieving Factors Nothing   Relieving Factors Comment -   Result of Injury No   Work-Related Injury -   Type of Injury -   Type of Injury Comment -       PAST MEDICAL HISTORY   Past Medical History:   Diagnosis Date    Arthritis     Arthropathy, unspecified, site unspecified     Bruising     bruising and bleeding    Diverticulitis     Diverticulosis     Essential hypertension     Fibromyalgia     GERD (gastroesophageal reflux disease)     Hearing loss     Idiopathic sensory axonal neuropathy, dx EMG/NCV '17 4/15/2018    Obesity (BMI 30-39. 9)     Postoperative respiratory failure ([address] 9/30/10    Pre-diabetes     11/16 diet controlled    Renal cell cancer ([address]     Stage T1NxMx    Respiratory failure, post-operative ([address]     Wears glasses        Past Surgical History:   Procedure Laterality Date    HX BREAST BIOPSY      HX BREAST REDUCTION      HX CATARACT REMOVAL      HX CHOLECYSTECTOMY      HX HERNIA REPAIR      HX HYSTERECTOMY  1984     total vaginal    HX OTHER SURGICAL  9/2010    SIGMOIDECTOMY    HX VEIN STRIPPING     . MEDICATIONS      Current Outpatient Medications   Medication Sig Dispense Refill    lidocaine 5 % topical cream Apply  to affected area four (4) times daily. 100 g 2    methocarbamol (ROBAXIN) 500 mg tablet Take one-half to 1 tab po every day to BID as needed for pain/spasms 60 Tab 1    mirabegron ER (MYRBETRIQ) 50 mg ER tablet Take 1 Tab by mouth daily. 90 Tab 3    loratadine (CLARITIN) 10 mg tablet Take 10 mg by mouth.  omeprazole (PRILOSEC) 40 mg capsule Take 40 mg by mouth daily.  traMADol-acetaminophen (ULTRACET) 37.5-325 mg per tablet Take 1 Tab by Mouth Every 6 Hours As Needed for Pain. No more than 8 tabs a day      fluticasone (FLONASE) 50 mcg/actuation nasal spray       DULoxetine (CYMBALTA) 20 mg capsule Take 1 Cap by mouth daily. (Patient taking differently: Take  by mouth daily.) 30 Cap 1    metoprolol succinate (TOPROL-XL) 200 mg XL tablet Take 1 Tab by mouth daily. 90 Tab 2    doxazosin (CARDURA) 4 mg tablet Take 1 Tab by mouth nightly. 90 Tab 2    acetaminophen (TYLENOL EXTRA STRENGTH) 500 mg tablet Take  by mouth every six (6) hours as needed for Pain.  ACCU-CHEK SOFTCLIX LANCETS [facility] [place]       ACCU-CHEK TAMY PLUS TEST STRP strip       aluminum hydrox-magnesium carb (GAVISCON)  mg/15 mL suspension Take 15 mL by mouth every six (6) hours as needed for Indigestion.  triamcinolone-emollient cmb#86 0.1 % crea 1 Applicator by Apply Externally route daily as needed.  amLODIPine (NORVASC) 10 mg tablet Take  by mouth daily.  Hydrochlorothiazide 12.5 mg tablet Take 12.5 mg by mouth every other day.           ALLERGIES    Allergies   Allergen Reactions    Aspirin Other (comments)    Codeine Other (comments)     Gi distress      Cymbalta [Duloxetine] Other (comments)     Chest pain    Darvon [Propoxyphene] Rash and Itching     Gi distress    Dextromethorphan Other (comments)     Stomach cramps    Meperidine Hives     Gi distress    Morphine Other (comments)     Neurological reaction    Other Medication Palpitations     Muscle relaxants    Pcn [Penicillins] Swelling    Propoxyphene N-Acetaminophen Hives    Sulfa (Sulfonamide Antibiotics) Hives          SOCIAL HISTORY    Social History     Socioeconomic History    Marital status:      Spouse name: Not on file    Number of children: Not on file    Years of education: Not on file    Highest education level: Not on file   Social Needs    Financial resource strain: Not on file    Food insecurity - worry: Not on file    Food insecurity - inability: Not on file   [employer] needs - medical: Not on file   [employer] needs - non-medical: Not on file   Occupational History    Not on file   Tobacco Use    Smoking status: Never Smoker    Smokeless tobacco: Never Used   Substance and Sexual Activity    Alcohol use: No    Drug use: No    Sexual activity: Not on file   Other Topics Concern    Not on file   Social History Narrative    Not on file       FAMILY HISTORY    Family History   Problem Relation Age of Onset    Heart Attack Mother 79    Diabetes Other     Hypertension Other     Arthritis-osteo Other     Heart Disease Other     Heart Attack Sister 71       REVIEW OF SYSTEMS  Review of Systems   Constitutional: Negative for chills, fever and weight loss. Respiratory: Negative for shortness of breath. Cardiovascular: Negative for chest pain. Gastrointestinal: Negative for constipation. Negative for fecal incontinence   Genitourinary: Negative for dysuria. Negative for urinary incontinence   Musculoskeletal:        Per HPI   Skin: Negative for rash. Neurological: Positive for tingling. Negative for dizziness, tremors, focal weakness and headaches.    Endo/Heme/Allergies: Does not bruise/bleed easily. Psychiatric/Behavioral: The patient does not have insomnia. PHYSICAL EXAMINATION  Visit Vitals  /88 Comment: Patient stated she took her BP   Pulse 72   Temp 97.4 °F (36.3 °C)   Resp 16   SpO2 97%         Accompanied by self. Constitutional:  Well developed, well nourished, in no acute distress. Psychiatric: Affect and mood are appropriate. Integumentary: No rashes or abrasions noted on exposed areas. Cardiovascular/Peripheral Vascular: Intact l pulses. No peripheral edema is noted. Lymphatic:  No evidence of lymphedema. No cervical lymphadenopathy. SPINE/MUSCULOSKELETAL EXAM      Thoracic spine:  Tenderness to palpation L posterior lower rib cage. Lumbar spine:  No rash, ecchymosis, or gross obliquity. No fasciculations. No focal atrophy is noted. Tenderness to palpation L5-S1. No tenderness to palpation at the sciatic notch. SI joints non-tender. Trochanters non tender. Sensation grossly intact to light touch. MOTOR:       Hip Flex  Quads Hamstrings Ankle DF EHL Ankle PF   Right +4/5 +4/5 +4/5 +4/5 +4/5 +4/5   Left +4/5 +4/5 +4/5 +4/5 +4/5 +4/5       Straight Leg raise negative. Ambulation without assistive device. FWB. Written by Ivan Hanks, as dictated by Wai Dimas MD.    I, Dr. Wai Dimas MD, confirm that all documentation is accurate. Ms. Clive Larios may have a reminder for a \"due or due soon\" health maintenance. I have asked that she contact her primary care provider for follow-up on this health maintenance.

## 2019-01-10 NOTE — PROGRESS NOTES
Verbal order entered per Dr. Maurice Slaughter as documented on blue sheet:Lidocaine ointment apply QID to affected areas. Disp 100g with 2 refills. Robaxin 500mg take 1 tab po every day to BID prn pain/spasm.  Disp 60 with 1 refill

## 2019-01-10 NOTE — PATIENT INSTRUCTIONS
Fibromyalgia: Care Instructions  Your Care Instructions    Fibromyalgia is a painful condition that is not completely understood by medical experts. The cause of fibromyalgia is not known. It can make you feel tired and ache all over. It causes tender spots at specific points of the body that hurt only when you press on them. You may have trouble sleeping, as well as other symptoms. These problems can upset your work and home life. Symptoms tend to come and go, although they may never go away completely. Fibromyalgia does not harm your muscles, joints, or organs. Follow-up care is a key part of your treatment and safety. Be sure to make and go to all appointments, and call your doctor if you are having problems. It's also a good idea to know your test results and keep a list of the medicines you take. How can you care for yourself at home? · Exercise often. Walk, swim, or bike to help with pain and sleep problems and to make you feel better. · Try to get a good night's sleep. Go to bed and get up at the same time each day, whether you feel rested or not. Make sure you have a good mattress and pillow. · Reduce stress. Avoid things that cause you stress, if you can. If not, work at making them less stressful. Learn to use biofeedback, guided imagery, meditation, or other methods to relax. · Make healthy changes. Eat a balanced diet, quit smoking, and limit alcohol and caffeine. · Use a heating pad set on low or take warm baths or showers for pain. Using cold packs for up to 20 minutes at a time can also relieve pain. Put a thin cloth between the cold pack and your skin. A gentle massage might help too. · Be safe with medicines. Take your medicines exactly as prescribed. Call your doctor if you think you are having a problem with your medicine. Your doctor may talk to you about taking antidepressant medicines. These medicines may improve sleep, relieve pain, and in some cases treat depression.   · Learn about fibromyalgia. This makes coping easier. Then, take an active role in your treatment. · Think about joining a support group with others who have fibromyalgia to learn more and get support. When should you call for help? Watch closely for changes in your health, and be sure to contact your doctor if:    · You feel sad, helpless, or hopeless; lose interest in things you used to enjoy; or have other symptoms of depression.     · Your fibromyalgia symptoms get worse. Where can you learn more? Go to http://angie-dayanara.info/. Enter V003 in the search box to learn more about \"Fibromyalgia: Care Instructions. \"  Current as of: June 4, 2018  Content Version: 11.8  © 9566-5211 [employer]wise, [employer]. Care instructions adapted under license by [employer] (which disclaims liability or warranty for this information). If you have questions about a medical condition or this instruction, always ask your healthcare professional. [address] any warranty or liability for your use of this information.

## 2019-01-11 ENCOUNTER — TELEPHONE (OUTPATIENT)
Dept: ORTHOPEDIC SURGERY | Age: 79
End: 2019-01-11

## 2019-01-11 NOTE — TELEPHONE ENCOUNTER
PA for Lidocaine [facility]intment [id number] May be denied due to suggesting the alternatives instead Lidocaine Mucosal Jelly or Lidocaine-prilocaine Topical Cream.    Also attempted to initiate PA for Methocarbamol however it stated \"this is a high risk medication for patient 72 years or older. Please consider changing to safer alternative Baclofen or Tizanidine. \" Please advise.

## 2019-01-14 RX ORDER — TIZANIDINE 2 MG/1
2 TABLET ORAL
Qty: 60 TAB | Refills: 0 | Status: SHIPPED | OUTPATIENT
Start: 2019-01-14 | End: 2019-01-16 | Stop reason: ALTCHOICE

## 2019-01-15 ENCOUNTER — TELEPHONE (OUTPATIENT)
Dept: ORTHOPEDIC SURGERY | Age: 79
End: 2019-01-15

## 2019-01-16 RX ORDER — BACLOFEN 10 MG/1
10 TABLET ORAL
Qty: 60 TAB | Refills: 0 | Status: SHIPPED | OUTPATIENT
Start: 2019-01-16 | End: 2019-06-17

## 2019-01-16 NOTE — TELEPHONE ENCOUNTER
Called Lorrie back and she stated the patient was getting tizanidine and robaxin. Informed Lorrie the PA for those medications were denied which is why Baclofen was sent in. Pauline Alaniz verbalized understanding and no further question at this time.

## 2019-01-16 NOTE — TELEPHONE ENCOUNTER
-prior authorization sent per message below and waiting approval/denial.  -called patient and verified name and . Patient was informed that prior authorization was sent for medication prescribed and waiting approval/denial which may take 1 to 5 business days. Patient verbalized understanding.

## 2019-01-16 NOTE — TELEPHONE ENCOUNTER
Jason Pettit from [employer] called and said that the patient was prescribed Baclofen medication today . That she does not know if we know that the patient is getting muscle relaxer medications from two other doctors. Jason Pettit would like a call back at tel. [phone].

## 2019-01-16 NOTE — TELEPHONE ENCOUNTER
Looks like this was to replace Tizanidine that got denied.  Call pharmacy and see what other muscle relaxers she has gotten recently and by who

## 2019-01-17 NOTE — TELEPHONE ENCOUNTER
Called patient and verified . Informed patient Tizanidine and Robaxin PA were denied and Baclofen was sent to pharmacy. Informed patient she could call and see if they filled it yet, because patient stated she hasn't received anything yet. Patient verbalized understanding and no further questions or concerns at this time.

## 2019-01-21 ENCOUNTER — TELEPHONE (OUTPATIENT)
Dept: ORTHOPEDIC SURGERY | Age: 79
End: 2019-01-21

## 2019-01-21 NOTE — TELEPHONE ENCOUNTER
Called patient and verified . Patient informed of message below. Patient verbalized understanding and no further questions or concerns at this time.

## 2019-01-28 ENCOUNTER — TELEPHONE (OUTPATIENT)
Dept: ORTHOPEDIC SURGERY | Age: 79
End: 2019-01-28

## 2019-01-30 NOTE — TELEPHONE ENCOUNTER
Called patient and verified . Informed patient of message below from Luke Ahumada, NP. Patient verbalized understanding and no further questions or concerns at this time.

## 2019-03-14 ENCOUNTER — OFFICE VISIT (OUTPATIENT)
Dept: ORTHOPEDIC SURGERY | Age: 79
End: 2019-03-14

## 2019-03-14 VITALS
OXYGEN SATURATION: 97 % | HEIGHT: 66 IN | DIASTOLIC BLOOD PRESSURE: 70 MMHG | SYSTOLIC BLOOD PRESSURE: 162 MMHG | RESPIRATION RATE: 11 BRPM | BODY MASS INDEX: 38.25 KG/M2 | HEART RATE: 73 BPM | WEIGHT: 238 LBS | TEMPERATURE: 96.4 F

## 2019-03-14 DIAGNOSIS — M54.5 LEFT LOW BACK PAIN, UNSPECIFIED CHRONICITY, WITH SCIATICA PRESENCE UNSPECIFIED: Primary | ICD-10-CM

## 2019-03-14 DIAGNOSIS — M25.552 LEFT HIP PAIN: ICD-10-CM

## 2019-03-14 RX ORDER — METHYLPREDNISOLONE 4 MG/1
TABLET ORAL
Qty: 1 DOSE PACK | Refills: 0 | Status: SHIPPED | OUTPATIENT
Start: 2019-03-14 | End: 2019-06-17 | Stop reason: ALTCHOICE

## 2019-03-14 NOTE — PROGRESS NOTES
1. Have you been to the ER, urgent care clinic since your last visit? Hospitalized since your last visit? No    2. Have you seen or consulted any other health care providers outside of the [address] [place] [address] since your last visit? Include any pap smears or colon screening.  No

## 2019-03-14 NOTE — PROGRESS NOTES
Myriam Brown  1940   Chief Complaint   Patient presents with    Hip Pain     LEFT HIP PAIN        HISTORY OF PRESENT ILLNESS  Myriam Brown is a [de-identified] y.o. female who presents today for reevaluation of left leg pain. She states for the last week she has started noticing buttock pain that radiates into her thigh and into her foot. Constant pain but worse with activity better with rest. She has a burning sensation as well. Pain is a 5/10. She feels it is because she has been sleeping on a sofa lately that may have caused her pain. Patient denies any fever, chills, chest pain, shortness of breath or calf pain. The remainder of the review of systems is negative. There are no new illness or injuries to report since last seen in the office. No changes in medications, allergies, social or family history. PHYSICAL EXAM:   Visit Vitals  /70   Pulse 73   Temp 96.4 °F (35.8 °C) (Oral)   Resp 11   Ht 5' 6\" (1.676 m)   Wt 238 lb (108 kg)   SpO2 97%   BMI 38.41 kg/m²     The patient is a well-developed, well-nourished female   in no acute distress. The patient is alert and oriented times three. The patient is alert and oriented times three. Mood and affect are normal.  LYMPHATIC: lymph nodes are not enlarged and are within normal limits  SKIN: normal in color and non tender to palpation. There are no bruises or abrasions noted. NEUROLOGICAL: Motor sensory exam is within normal limits. Reflexes are equal bilaterally.  There is normal sensation to pinprick and light touch  MUSCULOSKELETAL:  Examination Left hip   Skin Intact   Flexion    Extension ROM 20   External Rotation ROM 20   Internal Rotation ROM 20   Abduction ROM 45   Adduction ROM 30   FADDIR -   MOOK -   Log roll test -   Trochanteric tenderness -   Jr test -   Neurovascular Intact        Examination Lumbar   Skin Intact   Tenderness, Paraspinal muscles +   Paraspinal muscle spasms +   Flexion Fingertips to ankle Extension 10   Knee reflexes Normal   Ankle reflexes Normal   Straight leg raise -   Calf tenderness -       IMAGIN view xray images of lumbar spine with AP Pelvis on 3/14/2019 read and reviewed by myself reveal degenerative changes L3-L5     IMPRESSION:      ICD-10-CM ICD-9-CM    1. Left low back pain, unspecified chronicity, with sciatica presence unspecified M54.5 724.2 AMB POC XRAY, SPINE, LUMBOSACRAL; 2 O      REFERRAL TO PHYSICAL THERAPY   2. Left hip pain M25.552 719.45 POC XRAY, PELVIS; 1 OR 2 VIEWS        PLAN:   1. Left leg radicular pain - will put patient on medrol dose pack and refer to PT  Risk factors include: age, fibromyalgia  2. No cortisone injection indicated today   3. Yes Physical/Occupational Therapy indicated today lumbar spine  4. No diagnostic test indicated today:   5. No durable medical equipment indicated today  6. No referral indicated today   7. Yes medications indicated today: MEDROL dose pack  8.  No Narcotic indicated today   RTC 1 month    Follow-up Disposition: Not on [address] [place] [address], [place] [facility]

## 2019-03-29 ENCOUNTER — TELEPHONE (OUTPATIENT)
Dept: ORTHOPEDIC SURGERY | Age: 79
End: 2019-03-29

## 2019-03-29 NOTE — TELEPHONE ENCOUNTER
Patient called for Lobo Peterson. Patient said that Thomas Verdin has her getting Physical Therapy for her left side. Patient said she is now having problems on her Right side and would like to speak with Lobo Peterson before she goes to Physical Therapy. Patient tel. [phone]. Note: patient was seen on 3/14/19 for the left knee and left hip by Lobo Peterson.

## 2019-04-02 ENCOUNTER — OFFICE VISIT (OUTPATIENT)
Dept: ORTHOPEDIC SURGERY | Age: 79
End: 2019-04-02

## 2019-04-02 VITALS
BODY MASS INDEX: 38.09 KG/M2 | OXYGEN SATURATION: 98 % | DIASTOLIC BLOOD PRESSURE: 80 MMHG | RESPIRATION RATE: 16 BRPM | HEIGHT: 66 IN | TEMPERATURE: 97.2 F | SYSTOLIC BLOOD PRESSURE: 143 MMHG | WEIGHT: 237 LBS | HEART RATE: 62 BPM

## 2019-04-02 DIAGNOSIS — M54.10 RADICULAR PAIN OF RIGHT LOWER EXTREMITY: Primary | ICD-10-CM

## 2019-04-02 NOTE — PROGRESS NOTES
[facility]  1940   Chief Complaint   Patient presents with    Back Pain     lower back pain    Hip Pain     right hip f/u         HISTORY OF PRESENT ILLNESS  [address] is a [de-identified] y.o. female who presents today for reevaluation of right leg pain. She states this is worsening in the last month. she has started noticing buttock pain that radiates into her thigh and into her foot. She states her leg goes completely numb. Was unable to take medrol dose pack bc of GI issues and did not do PT. Pain is a 4/10. Patient denies any fever, chills, chest pain, shortness of breath or calf pain. The remainder of the review of systems is negative. There are no new illness or injuries to report since last seen in the office. No changes in medications, allergies, social or family history. PHYSICAL EXAM:   Visit Vitals  /80   Pulse 62   Temp 97.2 °F (36.2 °C) (Oral)   Resp 16   Ht 5' 6\" (1.676 m)   Wt 237 lb (107.5 kg)   SpO2 98%   BMI 38.25 kg/m²     The patient is a well-developed, well-nourished female   in no acute distress. The patient is alert and oriented times three. The patient is alert and oriented times three. Mood and affect are normal.  LYMPHATIC: lymph nodes are not enlarged and are within normal limits  SKIN: normal in color and non tender to palpation. There are no bruises or abrasions noted. NEUROLOGICAL: Motor sensory exam is within normal limits. Reflexes are equal bilaterally.  There is normal sensation to pinprick and light touch  MUSCULOSKELETAL:  Examination Right hip   Skin Intact   Flexion ROM 90   Extension ROM 20   External Rotation ROM 20   Internal Rotation ROM 20   Abduction ROM 45   Adduction ROM 30   FADDIR -   MOOK -   Log roll test -   Trochanteric tenderness -   Jr test -   Neurovascular Intact        Examination Lumbar   Skin Intact   Tenderness, Paraspinal muscles +   Paraspinal muscle spasms +   Flexion Fingertips to ankle   Extension 10   Knee reflexes Normal   Ankle reflexes Normal   Straight leg raise -   Calf tenderness -       IMAGIN view xray images of lumbar spine with AP Pelvis on 2019 read and reviewed by myself reveal degenerative changes L3-L5     IMPRESSION:      ICD-10-CM ICD-9-CM    1. Radicular pain of right lower extremity M54.10 724.4         PLAN:   1. Right leg radicular pain - unable to take medrol dose pack. Will do urgent referral to spine. Patient would like to defer MRI at this time  Risk factors include: age, fibromyalgia  2. No cortisone injection indicated today   3. No Physical/Occupational Therapy indicated today   4. No diagnostic test indicated today:   5. No durable medical equipment indicated today  6. No referral indicated today   7. No medications indicated today:   8.  No Narcotic indicated today   RTC PRN          Lelon Severin, PA-C [facility]

## 2019-04-24 ENCOUNTER — TELEPHONE (OUTPATIENT)
Dept: ORTHOPEDIC SURGERY | Age: 79
End: 2019-04-24

## 2019-04-24 NOTE — TELEPHONE ENCOUNTER
Patient called regarding her left knee stating that it gave out on her yesterday and she is wondering if she can wear an ace bandage or a brace until she see Dr. Angela Galo in May. I did try to offer an appointment with Danica Meyers but she did not want one.  Please advise patient at [phone]

## 2019-06-17 ENCOUNTER — OFFICE VISIT (OUTPATIENT)
Dept: ORTHOPEDIC SURGERY | Age: 79
End: 2019-06-17

## 2019-06-17 VITALS
SYSTOLIC BLOOD PRESSURE: 145 MMHG | HEIGHT: 66 IN | RESPIRATION RATE: 17 BRPM | DIASTOLIC BLOOD PRESSURE: 77 MMHG | WEIGHT: 238.8 LBS | OXYGEN SATURATION: 95 % | TEMPERATURE: 98.1 F | HEART RATE: 71 BPM | BODY MASS INDEX: 38.38 KG/M2

## 2019-06-17 DIAGNOSIS — G62.9 NEUROPATHY: ICD-10-CM

## 2019-06-17 DIAGNOSIS — M54.16 LUMBAR RADICULITIS: ICD-10-CM

## 2019-06-17 DIAGNOSIS — M47.816 LUMBAR SPONDYLOSIS: Primary | ICD-10-CM

## 2019-06-17 RX ORDER — GABAPENTIN 100 MG/1
CAPSULE ORAL
Qty: 60 CAP | Refills: 1 | Status: SHIPPED | OUTPATIENT
Start: 2019-06-17 | End: 2020-06-22

## 2019-06-17 NOTE — PROGRESS NOTES
Verbal order entered per Dr. Marie Patino as documented on blue sheet:MRI L-spine due to 4months of sciatica right and left. Gabapentin 100mg take 1 tab po QHS x 1week, then increase to BID.  Disp 60 with 1 refill

## 2019-06-17 NOTE — PROGRESS NOTES
[address]  [address], [address]  [place], [address] [place] [address] Street  Phone: [phone]  Fax: [phone]        Ernesto Harley  : 1940  PCP: Anel Woods MD    PROGRESS NOTE      ASSESSMENT AND PLAN    Diagnoses and all orders for this visit:    1. Lumbar spondylosis  -     MRI LUMB SPINE WO CONT; Future    2. Lumbar radiculitis  -     MRI LUMB SPINE WO CONT; Future    3. Neuropathy    Other orders  -     gabapentin (NEURONTIN) 100 mg capsule; Take 1 tab po QHS for 1 week, then increase to BID       1. Advised to continue HEP. 2. MRI lumbar spine - 4 months bilateral sciatica, R>L  3. Discussed life style modification, PT, medication, spinal injection, and surgery as treatment options   4. Trial of Gabapentin  5. Given information on neuropathic pain    F/U after MRI      HISTORY OF PRESENT ILLNESS  Debi Hall is a [de-identified] y.o. female. Pt presents to the office for a f/u visit for L thoracic pain, lumbar FA. Last OV given trial of Robaxin and Lidocaine cream.    Pt did not recieve the Lidocaine cream nor use Robaxin. Pt notes receiving two muscle relaxers from two providers so she did not use either. She states her RLE pain has been historically been worse than LLE, but LLE has worsened in the past 4-5 months. She states she has R>L LE numbness and pain at this time. Pt reports waddling while she walks. She states her pain and numbness is caused by walking and standing. She denies BLE pain or paresthesias with sitting. Pt reports short walking and standing tolerance. She notes she no longer walks for fun and barely makes it through cooking. She states she has daily pain, but has better and worse days. She notes swelling in BLE. Pt notes she has not slept in her bed, rather in recliner for the past 1.5 year due to fibro pain. She thinks this is aggravating the rest of her pains. Location of pain: Low back.   Does pain radiate into extremities: paresthesias R>L buttock to foot. BLE R>L pain. Does patient have weakness: none  Pt denies saddle paresthesias. Medications pt is on: Tylenol QHS with benefit. Denies persistent fevers, chills, weight changes, neurogenic bowel or bladder symptoms. Pt denies any recent GI ulcers, bleeds or renal dysfucntion. Treatments patient has tried:  Physical therapy:Yes low back  Doing HEP: Yes  Non-opioid medications: Yes Failed Cymbalta. Unable to take MDP. Spinal injections: No  Spinal surgery- No.   Last L MRI '18: Multilevel degenerative changes, FA. No high-grade stenosis      reviewed. Hx of renal cell carcinoma, neuropathy, pre-DM. Pt reports she has a fallen arch and uses a brace to prevent it from turning excessively. This is a pt of Dr. Vitaliy Mccabe, has a Dx of fibromyalgia and chronic ankle pain. She also consults with Dr. Gilford Jesus. ED 5/19/19 for chronic chest pain, palpitations, new onset LBBB. Pain Assessment  6/17/2019   Location of Pain Back   Pain Location Comment -   Location Modifiers -   Severity of Pain 5   Quality of Pain Aching;Dull; Sharp   Quality of Pain Comment stabbing   Duration of Pain -   Frequency of Pain Constant   Date Pain First Started -   Aggravating Factors (No Data)   Aggravating Factors Comment Increase in activity throughout the day    Limiting Behavior -   Relieving Factors Rest   Relieving Factors Comment -   Result of Injury -   Work-Related Injury -   Type of Injury -   Type of Injury Comment -       PAST MEDICAL HISTORY   Past Medical History:   Diagnosis Date    Arthritis     Arthropathy, unspecified, site unspecified     Bruising     bruising and bleeding    Diverticulitis     Diverticulosis     Essential hypertension     Fibromyalgia     GERD (gastroesophageal reflux disease)     Hearing loss     Idiopathic sensory axonal neuropathy, dx EMG/NCV '17 4/15/2018    Obesity (BMI 30-39. 9)     Postoperative respiratory failure ([address] 9/30/10    Pre-diabetes 11/16 diet controlled    Renal cell cancer ([facility] [address]     Stage T1NxMx    Respiratory failure, post-operative ([facility] [address]     Wears glasses        Past Surgical History:   Procedure Laterality Date    HX BREAST BIOPSY      HX BREAST REDUCTION      HX CATARACT REMOVAL      HX CHOLECYSTECTOMY      HX HERNIA REPAIR      HX HYSTERECTOMY  1984     total vaginal    HX OTHER SURGICAL  9/2010    SIGMOIDECTOMY    HX VEIN STRIPPING     . MEDICATIONS      Current Outpatient Medications   Medication Sig Dispense Refill    gabapentin (NEURONTIN) 100 mg capsule Take 1 tab po QHS for 1 week, then increase to BID 60 Cap 1    mirabegron ER (MYRBETRIQ) 50 mg ER tablet Take 1 Tab by mouth daily. 90 Tab 3    loratadine (CLARITIN) 10 mg tablet Take 10 mg by mouth.  omeprazole (PRILOSEC) 40 mg capsule Take 40 mg by mouth daily.  metoprolol succinate (TOPROL-XL) 200 mg XL tablet Take 1 Tab by mouth daily. 90 Tab 2    doxazosin (CARDURA) 4 mg tablet Take 1 Tab by mouth nightly. 90 Tab 2    acetaminophen (TYLENOL EXTRA STRENGTH) 500 mg tablet Take  by mouth every six (6) hours as needed for Pain.  ACCU-CHEK SOFTCLIX LANCETS [facility]       ACCU-CHEK TAMY PLUS TEST STRP strip       amLODIPine (NORVASC) 10 mg tablet Take  by mouth daily.  Hydrochlorothiazide 12.5 mg tablet Take 12.5 mg by mouth every other day.  traMADol-acetaminophen (ULTRACET) 37.5-325 mg per tablet Take 1 Tab by Mouth Every 6 Hours As Needed for Pain. No more than 8 tabs a day      fluticasone (FLONASE) 50 mcg/actuation nasal spray       aluminum hydrox-magnesium carb (GAVISCON)  mg/15 mL suspension Take 15 mL by mouth every six (6) hours as needed for Indigestion.  triamcinolone-emollient cmb#86 0.1 % crea 1 Applicator by Apply Externally route daily as needed.           ALLERGIES    Allergies   Allergen Reactions    Aspirin Other (comments)    Codeine Other (comments)     Gi distress      Cymbalta [Duloxetine] Other (comments)     Chest pain    Darvon [Propoxyphene] Rash and Itching     Gi distress    Dextromethorphan Other (comments)     Stomach cramps    Meperidine Hives     Gi distress    Morphine Other (comments)     Neurological reaction    Other Medication Palpitations     Muscle relaxants    Pcn [Penicillins] Swelling    Propoxyphene N-Acetaminophen Hives    Sulfa (Sulfonamide Antibiotics) Hives          SOCIAL HISTORY    Social History     Socioeconomic History    Marital status:      Spouse name: Not on file    Number of children: Not on file    Years of education: Not on file    Highest education level: Not on file   Occupational History    Not on file   Social Needs    Financial resource strain: Not on file    Food insecurity:     Worry: Not on file     Inability: Not on file    Transportation needs:     Medical: Not on file     Non-medical: Not on file   Tobacco Use    Smoking status: Never Smoker    Smokeless tobacco: Never Used   Substance and Sexual Activity    Alcohol use: No    Drug use: No    Sexual activity: Not on file   Lifestyle    Physical activity:     Days per week: Not on file     Minutes per session: Not on file    Stress: Not on file   Relationships    Social connections:     Talks on phone: Not on file     Gets together: Not on file     Attends Congregation service: Not on file     Active member of club or organization: Not on file     Attends meetings of clubs or organizations: Not on file     Relationship status: Not on file    Intimate partner violence:     Fear of current or ex partner: Not on file     Emotionally abused: Not on file     Physically abused: Not on file     Forced sexual activity: Not on file   Other Topics Concern    Not on file   Social History Narrative    Not on file       FAMILY HISTORY    Family History   Problem Relation Age of Onset    Heart Attack Mother 79    Diabetes Other     Hypertension Other     Arthritis-osteo Other     Heart Disease Other     Heart Attack Sister 71       REVIEW OF SYSTEMS  Review of Systems   Constitutional: Negative for chills, fever and weight loss. Respiratory: Negative for shortness of breath. Cardiovascular: Negative for chest pain. Gastrointestinal: Negative for constipation. Negative for fecal incontinence   Genitourinary: Negative for dysuria. Negative for urinary incontinence   Musculoskeletal:        Per HPI   Skin: Negative for rash. Neurological: Positive for tingling. Negative for dizziness, tremors, focal weakness and headaches. Endo/Heme/Allergies: Does not bruise/bleed easily. Psychiatric/Behavioral: The patient does not have insomnia. PHYSICAL EXAMINATION  Visit Vitals  /77   Pulse 71   Temp 98.1 °F (36.7 °C) (Oral)   Resp 17   Ht 5' 6\" (1.676 m)   Wt 238 lb 12.8 oz (108.3 kg)   SpO2 95%   BMI 38.54 kg/m²         Accompanied by self. Constitutional:  Well developed, well nourished, in no acute distress. Psychiatric: Affect and mood are appropriate. Integumentary: No rashes or abrasions noted on exposed areas. Cardiovascular/Peripheral Vascular: Intact l pulses. No peripheral edema is noted BLE. Lymphatic:  No evidence of lymphedema. No cervical lymphadenopathy. SPINE/MUSCULOSKELETAL EXAM      Lumbar spine:  No rash, ecchymosis, or gross obliquity. No fasciculations. No focal atrophy is noted. Tenderness to palpation L4-5, B/L sciatic notch. SI joints non-tender. Trochanters non tender. MOTOR:       Hip Flex  Quads Hamstrings Ankle DF EHL Ankle PF   Right +4/5 +4/5 +4/5 +4/5 +4/5 +4/5   Left +4/5 +4/5 +4/5 +4/5 +4/5 +4/5       Straight Leg raise negative. Ambulation without assistive device. FWB. Written by Lennox Isaacs, as dictated by Atilio Angulo MD.    I, Dr. Atilio Angulo MD, confirm that all documentation is accurate. Ms. Cassie Oswald may have a reminder for a \"due or due soon\" health maintenance.  I have asked that she contact her primary care provider for follow-up on this health maintenance.

## 2019-06-17 NOTE — PATIENT INSTRUCTIONS
Neuropathic Pain: Care Instructions  Your Care Instructions    Neuropathic pain is caused by pressure on or damage to your nerves. It's often simply called nerve pain. Some people feel this type of pain all the time. For others, it comes and goes. Diabetes, shingles, or an injury can cause nerve pain. Many people say the pain feels sharp, burning, or stabbing. But some people feel it as a dull ache. In some cases, it makes your skin very sensitive. So touch, pressure, and other sensations that did not hurt before may now cause pain. It's important to know that this kind of pain is real and can affect your quality of life. It's also important to know that treatment can help. Treatment includes pain medicines, exercise, and physical therapy. Medicines can help reduce the number of pain signals that travel over the nerves. This can make the painful areas less sensitive. It can also help you sleep better and improve your mood. But medicines are only one part of successful treatment. Most people do best with more than one kind of treatment. Your doctor may recommend that you try cognitive-behavioral therapy and stress management. Or, if needed, you may decide to try to quit smoking, lower your blood pressure, or better control blood sugar. These kinds of healthy changes can also make a difference. If you feel that your treatment is not working, talk to your doctor. And be sure to tell your doctor if you think you might be depressed or anxious. These are common problems that can also be treated. Follow-up care is a key part of your treatment and safety. Be sure to make and go to all appointments, and call your doctor if you are having problems. It's also a good idea to know your test results and keep a list of the medicines you take. How can you care for yourself at home? · Be safe with medicines. Read and follow all instructions on the label.   ? If the doctor gave you a prescription medicine for pain, take it as prescribed. ? If you are not taking a prescription pain medicine, ask your doctor if you can take an over-the-counter medicine. · Save hard tasks for days when you have less pain. Follow a hard task with an easy task. And remember to take breaks. · Relax, and reduce stress. You may want to try deep breathing or meditation. These can help. · Keep moving. Gentle, daily exercise can help reduce pain. Your doctor or physical therapist can tell you what type of exercise is best for you. This may include walking, swimming, and stationary biking. It may also include stretches and range-of-motion exercises. · Try heat, cold packs, and massage. · Get enough sleep. Constant pain can make you more tired. If the pain makes it hard to sleep, talk with your doctor. · Think positively. Your thoughts can affect your pain. Do fun things to distract yourself from the pain. See a movie, read a book, listen to music, or spend time with a friend. · Keep a pain diary. Try to write down how strong your pain is and what it feels like. Also try to notice and write down how your moods, thoughts, sleep, activities, and medicine affect your pain. These notes can help you and your doctor find the best ways to treat your pain. Reducing constipation caused by pain medicine  Pain medicines often cause constipation. To reduce constipation:  · Include fruits, vegetables, beans, and whole grains in your diet each day. These foods are high in fiber. · Drink plenty of fluids, enough so that your urine is light yellow or clear like water. If you have kidney, heart, or liver disease and have to limit fluids, talk with your doctor before you increase the amount of fluids you drink. · Get some exercise every day. Build up slowly to 30 to 60 minutes a day on 5 or more days of the week. · Take a fiber supplement, such as Citrucel or Metamucil, every day if needed. Read and follow all instructions on the label.   · Schedule time each day for a bowel movement. Having a daily routine may help. Take your time and do not strain when having a bowel movement. · Ask your doctor about a laxative. The goal is to have one easy bowel movement every 1 to 2 days. Do not let constipation go untreated for more than 3 days. When should you call for help? Call your doctor now or seek immediate medical care if:    · You feel sad, anxious, or hopeless for more than a few days. This could mean you are depressed. Depression is common in people who have a lot of pain. But it can be treated.     · You have trouble with bowel movements, such as:  ? No bowel movement in 3 days. ? Blood in the anal area, in your stool, or on the toilet paper. ? Diarrhea for more than 24 hours.    Watch closely for changes in your health, and be sure to contact your doctor if:    · Your pain is getting worse.     · You can't sleep because of pain.     · You are very worried or anxious about your pain.     · You have trouble taking your pain medicine.     · You have any concerns about your pain medicine or its side effects.     · You have vomiting or cramps for more than 2 hours. Where can you learn more? Go to http://angie-dayanara.info/. Enter K263 in the search box to learn more about \"Neuropathic Pain: Care Instructions. \"  Current as of: Antonieta 3, 2018  Content Version: 11.9  © 2490-5939 [employer]. Care instructions adapted under license by [employer] (which disclaims liability or warranty for this information). If you have questions about a medical condition or this instruction, always ask your healthcare professional. [place] [address] any warranty or liability for your use of this information.

## 2019-06-18 ENCOUNTER — DOCUMENTATION ONLY (OUTPATIENT)
Dept: ORTHOPEDIC SURGERY | Age: 79
End: 2019-06-18

## 2019-06-18 NOTE — PROGRESS NOTES
Order and office notes faxed to [facility] Scheduling to have them set up MRI L-Spine and to notify the patient of date. They will authorize with the insurance if needed. Patient aware.

## 2019-06-24 ENCOUNTER — TELEPHONE (OUTPATIENT)
Dept: ORTHOPEDIC SURGERY | Age: 79
End: 2019-06-24

## 2019-06-24 NOTE — TELEPHONE ENCOUNTER
Patient called with concerns about MRI, not sure if she can handle it because of anxiety depending on the type of machine. Please contact patient to advise what type of machine as she may want to r/s to another facility, [phone].

## 2019-07-01 ENCOUNTER — DOCUMENTATION ONLY (OUTPATIENT)
Dept: ORTHOPEDIC SURGERY | Age: 79
End: 2019-07-01

## 2019-07-01 NOTE — PROGRESS NOTES
Order and office notes have been faxed to [address] [place] [address] to have them call patient to set up MRI L-Spine. They will authorize with the insurance if needed. Patient aware.

## 2019-08-13 DIAGNOSIS — M47.816 LUMBAR SPONDYLOSIS: ICD-10-CM

## 2019-08-13 DIAGNOSIS — M54.16 LUMBAR RADICULITIS: ICD-10-CM

## 2019-08-22 ENCOUNTER — OFFICE VISIT (OUTPATIENT)
Dept: ORTHOPEDIC SURGERY | Age: 79
End: 2019-08-22

## 2019-08-22 VITALS
HEIGHT: 66 IN | RESPIRATION RATE: 14 BRPM | SYSTOLIC BLOOD PRESSURE: 147 MMHG | BODY MASS INDEX: 38.54 KG/M2 | DIASTOLIC BLOOD PRESSURE: 70 MMHG | HEART RATE: 69 BPM | TEMPERATURE: 97.5 F

## 2019-08-22 DIAGNOSIS — G60.8 IDIOPATHIC SENSORIMOTOR AXONAL NEUROPATHY: ICD-10-CM

## 2019-08-22 DIAGNOSIS — M48.062 SPINAL STENOSIS OF LUMBAR REGION WITH NEUROGENIC CLAUDICATION: Primary | ICD-10-CM

## 2019-08-22 NOTE — PROGRESS NOTES
Verbal order per Dr. Orellana Knows PT- lumbar stenosis; pt. Will call back with location. Patient was given a copy.

## 2019-08-22 NOTE — PROGRESS NOTES
[facility] presents today for   Chief Complaint   Patient presents with    Back Pain     MRI fu       Is someone accompanying this pt? NO    Is the patient using any DME equipment during OV? NO    Depression Screening:  3 most recent PHQ Screens 8/22/2019   Little interest or pleasure in doing things Not at all   Feeling down, depressed, irritable, or hopeless Not at all   Total Score PHQ 2 0       Learning Assessment:  Learning Assessment 8/22/2019   PRIMARY LEARNER Patient   HIGHEST LEVEL OF EDUCATION - PRIMARY LEARNER  -   PRIMARY LANGUAGE ENGLISH   LEARNER PREFERENCE PRIMARY OTHER (COMMENT)   ANSWERED BY patient   RELATIONSHIP SELF         Fall Risk  Fall Risk Assessment, last 12 mths 8/22/2019   Able to walk? Yes   Fall in past 12 months? No       Coordination of Care:  1. Have you been to the ER, urgent care clinic since your last visit? NO  Hospitalized since your last visit? NO    2. Have you seen or consulted any other health care providers outside of the [address] [place] [address] since your last visit? NO Include any pap smears or colon screening.  NO    Last  Checked 8/22/19

## 2019-08-22 NOTE — PATIENT INSTRUCTIONS
Lumbar Spinal Stenosis: Care Instructions  Your Care Instructions    Stenosis in the spine is a narrowing of the canal that is around the spinal cord and nerve roots in your back. It can happen as part of aging. Sometimes bone and other tissue grow into this canal and press on the nerves that branch out from the spinal cord. This can cause pain, numbness, and weakness. When it happens in the lower part of your back, it is called lumbar spinal stenosis. It can cause problems in the legs, feet, and rear end (buttocks). You may be able to get relief from the symptoms of spinal stenosis by taking pain medicine. Your doctor may suggest physical therapy and exercises to keep your spine strong and flexible. Some people try steroid shots to reduce swelling. If pain and numbness in your legs are still so bad that you cannot do your normal activities, you may need surgery. Follow-up care is a key part of your treatment and safety. Be sure to make and go to all appointments, and call your doctor if you are having problems. It's also a good idea to know your test results and keep a list of the medicines you take. How can you care for yourself at home? · Take an over-the-counter pain medicine. Nonsteroidal anti-inflammatory drugs (NSAIDs) such as ibuprofen or naproxen seem to work best. But if you can't take NSAIDs, you can try acetaminophen. Be safe with medicines. Read and follow all instructions on the label. · Do not take two or more pain medicines at the same time unless the doctor told you to. Many pain medicines have acetaminophen, which is Tylenol. Too much acetaminophen (Tylenol) can be harmful. · Stay at a healthy weight. Being overweight puts extra strain on your spine. · Change positions often when you sit or stand. This can ease pain. It may also reduce pressure on the spinal cord and its nerves. · Avoid doing things that make your symptoms worse.  Walking downhill and standing for a long time may cause pain.  · Stretch and strengthen your back muscles as your doctor or physical therapist recommends. If your doctor says it is okay to do them, these exercises may help. ? Lie on your back with your knees bent. Gently pull one bent knee to your chest. Put that foot back on the floor, and then pull the other knee to your chest.  ? Do pelvic tilts. Lie on your back with your knees bent. Tighten your stomach muscles. Pull your belly button (navel) in and up toward your ribs. You should feel like your back is pressing to the floor and your hips and pelvis are slightly lifting off the floor. Hold for 6 seconds while breathing smoothly. ? Stand with your back flat against a wall. Slowly slide down until your knees are slightly bent. Hold for 10 seconds, then slide back up the wall. · Remove or change anything in your house that may cause you to fall. Keep walkways clear of clutter, electrical cords, and throw rugs. When should you call for help? Call 911 anytime you think you may need emergency care. For example, call if:    · You are unable to move a leg at all.   [facility] your doctor now or seek immediate medical care if:    · You have new or worse symptoms in your legs, belly, or buttocks. Symptoms may include:  ? Numbness or tingling. ? Weakness. ? Pain.     · You lose bladder or bowel control.    Watch closely for changes in your health, and be sure to contact your doctor if:    · You have a fever, lose weight, or don't feel well.     · You are not getting better as expected. Where can you learn more? Go to http://angie-dayanara.info/. Zach Shahid in the search box to learn more about \"Lumbar Spinal Stenosis: Care Instructions. \"  Current as of: September 20, 2018  Content Version: 12.1  © 6712-8851 [employer]. Care instructions adapted under license by [employer] (which disclaims liability or warranty for this information).  If you have questions about a medical condition or this instruction, always ask your healthcare professional. [address] any warranty or liability for your use of this information. Learning About Sleeping Well  What does sleeping well mean? Sleeping well means getting enough sleep. How much sleep is enough varies among people. The number of hours you sleep is not as important as how you feel when you wake up. If you do not feel refreshed, you probably need more sleep. Another sign of not getting enough sleep is feeling tired during the day. The average total nightly sleep time is 7½ to 8 hours. Healthy adults may need a little more or a little less than this. Why is getting enough sleep important? Getting enough quality sleep is a basic part of good health. When your sleep suffers, your mood and your thoughts can suffer too. You may find yourself feeling more grumpy or stressed. Not getting enough sleep also can lead to serious problems, including injury, accidents, anxiety, and depression. What might cause poor sleeping? Many things can cause sleep problems, including:  · Stress. Stress can be caused by fear about a single event, such as giving a speech. Or you may have ongoing stress, such as worry about work or school. · Depression, anxiety, and other mental or emotional conditions. · Changes in your sleep habits or surroundings. This includes changes that happen where you sleep, such as noise, light, or sleeping in a different bed. It also includes changes in your sleep pattern, such as having jet lag or working a late shift. · Health problems, such as pain, breathing problems, and restless legs syndrome. · Lack of regular exercise. How can you help yourself? Here are some tips that may help you sleep more soundly and wake up feeling more refreshed. Your sleeping area  · Use your bedroom only for sleeping and sex. A bit of light reading may help you fall asleep.  But if it doesn't, do your reading elsewhere in the house. Don't watch TV in bed. · Be sure your bed is big enough to stretch out comfortably, especially if you have a sleep partner. · Keep your bedroom quiet, dark, and cool. Use curtains, blinds, or a sleep mask to block out light. To block out noise, use earplugs, soothing music, or a \"white noise\" machine. Your evening and bedtime routine  · Create a relaxing bedtime routine. You might want to take a warm shower or bath, listen to soothing music, or drink a cup of noncaffeinated tea. · Go to bed at the same time every night. And get up at the same time every morning, even if you feel tired. What to avoid  · Limit caffeine (coffee, tea, caffeinated sodas) during the day, and don't have any for at least 4 to 6 hours before bedtime. · Don't drink alcohol before bedtime. Alcohol can cause you to wake up more often during the night. · Don't smoke or use tobacco, especially in the evening. Nicotine can keep you awake. · Don't take naps during the day, especially close to bedtime. · Don't lie in bed awake for too long. If you can't fall asleep, or if you wake up in the middle of the night and can't get back to sleep within 15 minutes or so, get out of bed and go to another room until you feel sleepy. · Don't take medicine right before bed that may keep you awake or make you feel hyper or energized. Your doctor can tell you if your medicine may do this and if you can take it earlier in the day. If you can't sleep  · Imagine yourself in a peaceful, pleasant scene. Focus on the details and feelings of being in a place that is relaxing. · Get up and do a quiet or boring activity until you feel sleepy. · Don't drink any liquids after 6 p.m. if you wake up often because you have to go to the bathroom. Where can you learn more? Go to http://angie-dayanara.info/. Enter T705 in the search box to learn more about \"Learning About Sleeping Well. \"  Current as of: September 11, 2018  Content Version: 12.1  © 9631-4137 [employer]. Care instructions adapted under license by [employer] (which disclaims liability or warranty for this information). If you have questions about a medical condition or this instruction, always ask your healthcare professional. [address] any warranty or liability for your use of this information.

## 2019-08-22 NOTE — PROGRESS NOTES
[facility] [address]  [address], [address]  [place]  Phone: [phone]  Fax: [phone]        Sheila Ashraf  : 1940  PCP: Britton Joseph MD    PROGRESS NOTE      ASSESSMENT AND PLAN    Diagnoses and all orders for this visit:    1. Spinal stenosis of lumbar region with neurogenic claudication  -     REFERRAL TO PHYSICAL THERAPY    2. Idiopathic sensory axonal neuropathy, dx EMG/NCV '       1. Advised to continue HEP. May use heat. 2. No indications for surgery at this time. 3. Discussed life style modification, PT, medication, spinal injection, and surgery as treatment options   4. Discussed side effects of Gabapentin  5. May take Tylenol PM QHS  6. Referral to PT  7. Given information on lumbar stenosis, sleep health    F/U 6 weeks      HISTORY OF PRESENT ILLNESS  Rustam Flores is a [de-identified] y.o. female. Last visit pt was sent to have a lumbar spine MRI. Images reviewed with the pt. Last OV given trial of Gabapentin. Pt states she did not try Gabapentin due to fear of side effects. However, she states she needs to try something. She notes she continues to have trouble sleeping due to back pain with laying. She states she has been sleeping in the recliner. Pt notes not being able to sleep or nap is depressing. She states her low back is alright with standing, but prolonged standing elicits numbness in BLE. She c/o heaviness feeling in her legs. Admits to knee issues also. Location of pain: Low back. Does pain radiate into extremities: paresthesias R>L buttock to foot. BLE R>L pain. Does patient have weakness: none  Pt denies saddle paresthesias.    Medications pt is on: Tylenol QHS with benefit. Pt denies recent ED visits or hospitalizations. Denies persistent fevers, chills, weight changes, neurogenic bowel or bladder symptoms.      Treatments patient has tried:  Physical therapy:Yes low back   Doing HEP: Yes  Non-opioid medications: Yes Failed Cymbalta - chest pain. Unable to take MDP. Spinal injections: No  Spinal surgery- No.   Last L MRI '19: Mild stenosis L3-4     reviewed. Hx of renal cell carcinoma, neuropathy, pre-DM. Pt reports she has a fallen arch and uses a brace to prevent it from turning excessively. This is a pt of Dr. Ruddy Warren, has a Dx of fibromyalgia and chronic ankle pain. She also consults with Dr. Luana Duron. Pt has a -aid come out 3 times per week. Pain Assessment  8/22/2019   Location of Pain Back   Pain Location Comment -   Location Modifiers -   Severity of Pain 6   Quality of Pain Aching   Quality of Pain Comment -   Duration of Pain Persistent   Frequency of Pain Constant   Date Pain First Started -   Aggravating Factors (No Data)   Aggravating Factors Comment certain movements   Limiting Behavior -   Relieving Factors (No Data)   Relieving Factors Comment stretching helps   Result of Injury -   Work-Related Injury -   Type of Injury -   Type of Injury Comment -       PAST MEDICAL HISTORY   Past Medical History:   Diagnosis Date    Arthritis     Arthropathy, unspecified, site unspecified     Bruising     bruising and bleeding    Diverticulitis     Diverticulosis     Essential hypertension     Fibromyalgia     GERD (gastroesophageal reflux disease)     Hearing loss     Idiopathic sensory axonal neuropathy, dx EMG/NCV '17 4/15/2018    Obesity (BMI 30-39. 9)     Postoperative respiratory failure ([address] 9/30/10    Pre-diabetes     11/16 diet controlled    Renal cell cancer ([address]     Stage T1NxMx    Respiratory failure, post-operative ([address]     Wears glasses        Past Surgical History:   Procedure Laterality Date    HX BREAST BIOPSY      HX BREAST REDUCTION      HX CATARACT REMOVAL      HX CHOLECYSTECTOMY      HX HERNIA REPAIR      HX HYSTERECTOMY  1984     total vaginal    HX OTHER SURGICAL  9/2010    SIGMOIDECTOMY    HX VEIN STRIPPING     .       MEDICATIONS      Current Outpatient Medications   Medication Sig Dispense Refill    mirabegron ER (MYRBETRIQ) 50 mg ER tablet Take 1 Tab by mouth daily. 90 Tab 3    loratadine (CLARITIN) 10 mg tablet Take 10 mg by mouth.  omeprazole (PRILOSEC) 40 mg capsule Take 40 mg by mouth daily.  fluticasone (FLONASE) 50 mcg/actuation nasal spray       metoprolol succinate (TOPROL-XL) 200 mg XL tablet Take 1 Tab by mouth daily. 90 Tab 2    doxazosin (CARDURA) 4 mg tablet Take 1 Tab by mouth nightly. 90 Tab 2    acetaminophen (TYLENOL EXTRA STRENGTH) 500 mg tablet Take  by mouth every six (6) hours as needed for Pain.  ACCU-CHEK SOFTCLIX LANCETS [facility]       ACCU-CHEK TAMY PLUS TEST STRP strip       aluminum hydrox-magnesium carb (GAVISCON)  mg/15 mL suspension Take 15 mL by mouth every six (6) hours as needed for Indigestion.  triamcinolone-emollient cmb#86 0.1 % crea 1 Applicator by Apply Externally route daily as needed.  amLODIPine (NORVASC) 10 mg tablet Take  by mouth daily.  Hydrochlorothiazide 12.5 mg tablet Take 12.5 mg by mouth every other day.  gabapentin (NEURONTIN) 100 mg capsule Take 1 tab po QHS for 1 week, then increase to BID 60 Cap 1    traMADol-acetaminophen (ULTRACET) 37.5-325 mg per tablet Take 1 Tab by Mouth Every 6 Hours As Needed for Pain.  No more than 8 tabs a day          ALLERGIES    Allergies   Allergen Reactions    Aspirin Other (comments)    Codeine Other (comments)     Gi distress      Cymbalta [Duloxetine] Other (comments)     Chest pain    Darvon [Propoxyphene] Rash and Itching     Gi distress    Dextromethorphan Other (comments)     Stomach cramps    Meperidine Hives     Gi distress    Morphine Other (comments)     Neurological reaction    Other Medication Palpitations     Muscle relaxants    Pcn [Penicillins] Swelling    Propoxyphene N-Acetaminophen Hives    Sulfa (Sulfonamide Antibiotics) Hives          SOCIAL HISTORY    Social History     Socioeconomic History    Marital status:      Spouse name: Not on file    Number of children: Not on file    Years of education: Not on file    Highest education level: Not on file   Occupational History    Not on file   Social Needs    Financial resource strain: Not on file    Food insecurity:     Worry: Not on file     Inability: Not on file    Transportation needs:     Medical: Not on file     Non-medical: Not on file   Tobacco Use    Smoking status: Never Smoker    Smokeless tobacco: Never Used   Substance and Sexual Activity    Alcohol use: No    Drug use: No    Sexual activity: Not on file   Lifestyle    Physical activity:     Days per week: Not on file     Minutes per session: Not on file    Stress: Not on file   Relationships    Social connections:     Talks on phone: Not on file     Gets together: Not on file     Attends Islam service: Not on file     Active member of club or organization: Not on file     Attends meetings of clubs or organizations: Not on file     Relationship status: Not on file    Intimate partner violence:     Fear of current or ex partner: Not on file     Emotionally abused: Not on file     Physically abused: Not on file     Forced sexual activity: Not on file   Other Topics Concern    Not on file   Social History Narrative    Not on file     Socioeconomic History    Marital status:      Spouse name: Not on file    Number of children: Not on file    Years of education: Not on file    Highest education level: Not on file   Occupational History    Not on file   Social Needs    Financial resource strain: Not on file    Food insecurity:     Worry: Not on file     Inability: Not on file    Transportation needs:     Medical: Not on file     Non-medical: Not on file   Tobacco Use    Smoking status: Never Smoker    Smokeless tobacco: Never Used   Substance and Sexual Activity    Alcohol use: No    Drug use: No    Sexual activity: Not on file   Lifestyle    Physical activity:     Days per week: Not on file     Minutes per session: Not on file    Stress: Not on file   Relationships    Social connections:     Talks on phone: Not on file     Gets together: Not on file     Attends Faith service: Not on file     Active member of club or organization: Not on file     Attends meetings of clubs or organizations: Not on file     Relationship status: Not on file    Intimate partner violence:     Fear of current or ex partner: Not on file     Emotionally abused: Not on file     Physically abused: Not on file     Forced sexual activity: Not on file   Other Topics Concern    Not on file   Social History Narrative    Not on file      Problem Relation Age of Onset    Heart Attack Mother 79    Diabetes Other     Hypertension Other     Arthritis-osteo Other     Heart Disease Other     Heart Attack Sister 71       REVIEW OF SYSTEMS  Review of Systems   Constitutional: Negative for chills, fever and weight loss. Respiratory: Negative for shortness of breath. Cardiovascular: Negative for chest pain. Gastrointestinal: Negative for constipation. Negative for fecal incontinence   Genitourinary: Negative for dysuria. Negative for urinary incontinence   Musculoskeletal:        Per HPI   Skin: Negative for rash. Neurological: Positive for tingling. Negative for dizziness, tremors, focal weakness and headaches. Endo/Heme/Allergies: Does not bruise/bleed easily. Psychiatric/Behavioral: The patient has insomnia. PHYSICAL EXAMINATION  Visit Vitals  /70 (BP 1 Location: Left arm, BP Patient Position: Sitting)   Pulse 69   Temp 97.5 °F (36.4 °C) (Oral)   Resp 14   Ht 5' 6\" (1.676 m)   BMI 38.54 kg/m²         Accompanied by self. Constitutional:  Well developed, well nourished, in no acute distress. Psychiatric: Affect and mood are appropriate. Integumentary: No rashes or abrasions noted on exposed areas.     Cardiovascular/Peripheral Vascular: 1+ nonpitting peripheral edema is noted BLE. SPINE/MUSCULOSKELETAL EXAM    Lumbar spine:  No rash, ecchymosis, or gross obliquity. No fasciculations. No focal atrophy is noted. Tenderness to palpation L4-5. No tenderness to palpation at the sciatic notch. SI joints non-tender. Trochanters non tender. MOTOR:       Hip Flex  Quads Hamstrings Ankle DF EHL Ankle PF   Right +4/5 +4/5 +4/5 +4/5 +4/5 +4/5   Left +4/5 +4/5 +4/5 +4/5 +4/5 +4/5     Straight Leg raise negative. Primarily back pain with hip ROM bilaterally. Ambulation without assistive device. FWB. Written by Humza Leong, as dictated by Liat Sims MD.    I, Dr. Liat Sims MD, confirm that all documentation is accurate. Ms. Charan Li may have a reminder for a \"due or due soon\" health maintenance. I have asked that she contact her primary care provider for follow-up on this health maintenance.

## 2019-10-07 ENCOUNTER — TELEPHONE (OUTPATIENT)
Dept: ORTHOPEDIC SURGERY | Age: 79
End: 2019-10-07

## 2019-10-07 NOTE — TELEPHONE ENCOUNTER
Patient called and said that Evangelina Rios had referred her to [facility] for her Spinal Stenosis. Patient said that [facility] was charging her $40.00 per visit. Patient said she could not afford it. Patient said she now found [facility] and they do not charge as much. Patient said she will need a new order for Physical Therapy . That she would like to go to [facility]. Patient said her back has been hurting and would like to get the Physical Therapy done as soon as possible. [facility] tel. [phone]. She did not have the Fax #. Patient would like a call when this is done at   [facility]. [phone].

## 2019-11-06 ENCOUNTER — OFFICE VISIT (OUTPATIENT)
Dept: ORTHOPEDIC SURGERY | Age: 79
End: 2019-11-06

## 2019-11-06 VITALS
OXYGEN SATURATION: 97 % | TEMPERATURE: 95.8 F | HEIGHT: 66 IN | DIASTOLIC BLOOD PRESSURE: 72 MMHG | WEIGHT: 238 LBS | SYSTOLIC BLOOD PRESSURE: 152 MMHG | BODY MASS INDEX: 38.25 KG/M2 | HEART RATE: 66 BPM | RESPIRATION RATE: 12 BRPM

## 2019-11-06 DIAGNOSIS — M79.672 BILATERAL FOOT PAIN: ICD-10-CM

## 2019-11-06 DIAGNOSIS — M72.2 PLANTAR FASCIITIS OF LEFT FOOT: ICD-10-CM

## 2019-11-06 DIAGNOSIS — M79.671 BILATERAL FOOT PAIN: ICD-10-CM

## 2019-11-06 DIAGNOSIS — Q66.6 PES PLANOVALGUS: Primary | ICD-10-CM

## 2019-11-06 NOTE — PROGRESS NOTES
1. Have you been to the ER, urgent care clinic since your last visit? Hospitalized since your last visit? No    2. Have you seen or consulted any other health care providers outside of the [address] [place] [address] since your last visit? Include any pap smears or colon screening.  No

## 2019-11-06 NOTE — PROGRESS NOTES
AMBULATORY PROGRESS NOTE      Patient: Mayi Ramon             MRN: [id number]     SSN: [id number] Body mass index is 38.41 kg/m². YOB: 1940     AGE: 78 y.o. SEX: female    PCP: Daya Shaw MD     IMPRESSION/DIAGNOSIS AND TREATMENT PLAN     DIAGNOSES  1. Pes planovalgus    2. Plantar fasciitis of left foot    3. Bilateral foot pain        Orders Placed This Encounter    AMB SUPPLY ORDER    [02510] Foot Min 3V    [66878] Foot Min 3V    REFERRAL TO PHYSICAL THERAPY      Mayi Ramon understands her diagnoses and the proposed plan. She is well-known to me. She has significant bilateral planovalgus alignment with calcaneofibular impingement on the right more than the left and more symptomatic on the right than the left today. She was recently diagnosed with spinal stenosis. Recommendation is for custom orthotics, medially posted. She is diabetic, so her insurance should cover this. Continue conservative care for her right fourth toe for which she had what sounds like a bacterial infection to the right fourth toe distal aspect treated and prescribed by her podiatrist.  She can get her feet lathered with lotion to keep her feet moist.  Otherwise, over-the-counter Lamisil can be used to the right number four toenail plate, for which she has a mild fungal infection, but there is no pus or drainage seen. I recommend continued conservative care. Additional plans are listed as below. Arnaldo Costa MD, has ordered a custom brace or DME product for you. This will be customized and made for you by an outside facility. I am requesting that you contact the Orthotist company provided below in order to have the prescription made. Mayi Ramon is in need of CUSTOM   Bilateral     1. Bilateral: TRILAMINAR ORTHOTIC,  MEDIAL POSTED Bilateral    2. GOAL OF TREATMENT TO: to provide M/L stability, optimal arch support, and limit ML/AP motion.      Jose Chantell De La Vega needs tri planar control (Medial / Lateral stability, optimal arch support, and limited Medial/Lateral // Anterior/Posterior Motion) of the foot and ankle in order to improve anatomical alignment, protect/control motion, and to relieve pain. Plan:    1) Use OTC Lamisil or Eucerin on the foot, over the right #4 and 5 toes. 2) DME Order: Custom bilateral trilaminar diabetic orthotic insert with medial posting (). 3) Referral to Physical Therapy for land therapy program.   4) Continue activity modification as directed. RTO - 5 weeks     HPI AND EXAMINATION     Mnidy Perkins IS A 78 y.o. female who presents to my outpatient office complaining of bilateral foot pain. Ms. Zia Potts reports that she has been experiencing pain in her left plantar arch. The patient denies any recent falls, twists, or trauma. She notes that her pain has been present for a while but that the pain has been progressively worsening. She notes that she has a fixed AFO for her left foot and inquires about whether it is too tight and if that is what is causing her plantar foot pain. As it regards to her right foot, Ms. Zia Potts reports that she has an fungal infection in her right #4 toe nail plates. She reports that she has inserts in her shoes which she wears for her flat feet. She finds that sometimes, when she wears her inserts with shoes with better arch support, she experiences more pain at the end of the day. She reports that she had a custom brace made for her in the past, but she notes that it is difficulty to wear. She finds that she experiences difficulty when walking due to her foot pain. She notes that she recently saw Dr. Shoaib Lawson and was diagnosed with spinal stenosis. The patient has h/o DM but does not take medications.      Visit Vitals  /72   Pulse 66   Temp 95.8 °F (35.4 °C) (Oral)   Resp 12   Ht 5' 6\" (1.676 m)   Wt 238 lb (108 kg)   SpO2 97%   BMI 38.41 kg/m²     Appearance: Alert, well appearing and pleasant patient who is in no distress, oriented to person, place/time, and who follows commands. Psychiatric: Affect and mood are appropriate. Cardiovascular/Peripheral Vascular: Normal Pulses to each hand and foot  Musculoskeletal:  LOCATION:  Left FOOT/ANKLE  Integumentary: No rashes, skin patches, wounds, or abrasions to the right or left legs       Warm and normal color. No regions of expressible drainage. Palpable fullness or mass is not present      Gait: Limp with gait is not present       Tenderness: mild PLANTAR FASCIA REGION at the mid portion of the foot, near the FDL, with no NODULARITIES      Motor/Strength/Tone Exam: WNL      Sensory Exam:   Intact Normal Sensation to ankle/foot      Stability Testing: No anterolateral or varus instability of the Ankle or Subtalar Joints               No peroneal tendon instability noted      ROM: Normal ROM noted to ankle/foot      Contractures: No Achilles or Gastrocnemius Contractures      Calf tenderness: Absent for calf or gastrocnemius muscle regions       Soft, supple, non tender, non taut lower extremity compartments       Alignment: Severe planovalgus alignment  Wounds/Abrasions:   None present  Extremities:   No embolic phenomena to the toes or hands         No significant edema to the foot and or toes.         Lower extremities are warm and appear well perfused    DVT: No evidence of DVT seen on examination at this time    No calf swelling, no tenderness to calf muscles  Lymphatic:  No Evidence of Lymphedema  Vascular: Medial Border of Tibia Region: Edema is not present        Pulses: Dorsalis Pedis &  Posterior Tibial Pulses : Palpable yes        Varicosities Lower Limbs :  None    Neuro: Negative bilateral Straight leg raise (seated position)   no Tinel's at PM NV Bundle Tarsal Canal   no Proximal Tarsal Tunnel Tenderness    no Distal Tarsal Tunnel Tenderness    See Musculoskeletal section for pertinent individual extremity examination    No abnormal hand/wrist, foot/ankle, or facial/neck tremors. ANKLE/FOOT right    Tenderness: no tenderness to palpation at this time  Cutaneous: Darkened discoloration to the 4th and 5th nail plates, thickened, no pus    Dry, scaly skin at the medial side of the 4th toe at the distal phalanx  Joint Motion: Not tested at this time  Joint / Tendon Stability: No Ankle or Subtalar instability or joint laxity. No peroneal sublux ability or dislocation  Alignment: Severe planovalgus alignment  Neuro Motor/Sensory: NL/NL. Vascular: NL foot/ankle pulses. Lymphatics: No extremity lymphedema, No calf swelling, no tenderness to calf muscles. 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 9)     Postoperative respiratory failure ([facility] [address] 9/30/10    Pre-diabetes     11/16 diet controlled    Renal cell cancer ([facility] [address]     Stage [id number]    Respiratory failure, post-operative ([address]     Wears glasses      Current Outpatient Medications   Medication Sig    gabapentin (NEURONTIN) 100 mg capsule Take 1 tab po QHS for 1 week, then increase to BID    mirabegron ER (MYRBETRIQ) 50 mg ER tablet Take 1 Tab by mouth daily.  loratadine (CLARITIN) 10 mg tablet Take 10 mg by mouth.  omeprazole (PRILOSEC) 40 mg capsule Take 40 mg by mouth daily.  traMADol-acetaminophen (ULTRACET) 37.5-325 mg per tablet Take 1 Tab by Mouth Every 6 Hours As Needed for Pain. No more than 8 tabs a day    fluticasone (FLONASE) 50 mcg/actuation nasal spray     metoprolol succinate (TOPROL-XL) 200 mg XL tablet Take 1 Tab by mouth daily.     doxazosin (CARDURA) 4 mg tablet Take 1 Tab by mouth nightly.  acetaminophen (TYLENOL EXTRA STRENGTH) 500 mg tablet Take  by mouth every six (6) hours as needed for Pain.  ACCU-CHEK SOFTCLIX LANCETS [facility]     ACCU-CHEK TAMY PLUS TEST STRP strip     aluminum hydrox-magnesium carb (GAVISCON)  mg/15 mL suspension Take 15 mL by mouth every six (6) hours as needed for Indigestion.  triamcinolone-emollient cmb#86 0.1 % crea 1 Applicator by Apply Externally route daily as needed.  amLODIPine (NORVASC) 10 mg tablet Take  by mouth daily.  Hydrochlorothiazide 12.5 mg tablet Take 12.5 mg by mouth every other day. No current facility-administered medications for this visit.       Allergies   Allergen Reactions    Aspirin Other (comments)    Codeine Other (comments)     Gi distress      Cymbalta [Duloxetine] Other (comments)     Chest pain    Darvon [Propoxyphene] Rash and Itching     Gi distress    Dextromethorphan Other (comments)     Stomach cramps    Meperidine Hives     Gi distress    Morphine Other (comments)     Neurological reaction    Other Medication Palpitations     Muscle relaxants    Pcn [Penicillins] Swelling    Propoxyphene N-Acetaminophen Hives    Sulfa (Sulfonamide Antibiotics) Hives     Past Surgical History:   Procedure Laterality Date    HX BREAST BIOPSY      HX BREAST REDUCTION      HX CATARACT REMOVAL      HX CHOLECYSTECTOMY      HX HERNIA REPAIR      HX HYSTERECTOMY  1984     total vaginal    HX OTHER SURGICAL  9/2010    SIGMOIDECTOMY    HX VEIN STRIPPING       Social History     Occupational History    Not on file   Tobacco Use    Smoking status: Never Smoker    Smokeless tobacco: Never Used   Substance and Sexual Activity    Alcohol use: No    Drug use: No    Sexual activity: Not on file     Family History   Problem Relation Age of Onset    Heart Attack Mother 79    Diabetes Other     Hypertension Other     Arthritis-osteo Other     Heart Disease Other     Heart Attack Sister 71 REVIEW OF SYSTEMS : 11/6/2019  ALL BELOW ARE Negative except : SEE HPI     Constitutional: Negative for fever, chills and weight loss. Neg Weight Loss  Cardiovascular: Negative for chest pain, claudication and leg swelling. SOB, BAIG   Gastrointestinal/Urological: Negative for  pain, N/V/D/C, Blood in stool or urine,dysuria                         Hematuria, Incontinence, pelvic pain  Musculoskeletal: see HPI. Neurological: Negative for dizziness and weakness, headaches,Visual Changes             Confusion,  Or Seizures,   Psychiatric/Behavioral: Negative for depression, memory loss and substance abuse. Extremities:  Negative for hair changes, rash or skin lesion changes. Hematologic: Negative for Bleeding problems, bruising, pallor or swollen lymph nodes. Peripheral Vascular: No calf pain, vascular vein tenderness to calf pain              No calf throbbing, posterior knee throbbing pain     DIAGNOSTIC IMAGING       FOOT X RAYS 3 VIEWS Bilateral   11/6/2019    NON WEIGHT BEARING    X RAYS AT [address] [place] [address]  11/6/2019    {Bilateral FOOT:     Bones: No fractures or dislocations. No focal osteolytic or osteoblastic process  Bone Spurs Superior >> Inferior Bone Spurs  (small left achilles spur)  Alignment: Deformity Location: None present  Joint Condition: Moderate OA changes present   JOINT SPACE NARROWING AND OA AT 2 and 3 TMT JOINT SPACES   Soft Tissues Mild swelling dorsal midfoot   No ankle joint effusion in lateral projection. Mineralization: suggests Osteoporosis    I have personally reviewed the results of the above study. The interpretation of this study is my professional opinion    Written by Quita Potter, as dictated by Dr. Sneha French. I, Dr. Sneha French, confirm that all documentation is accurate.

## 2019-11-19 ENCOUNTER — TELEPHONE (OUTPATIENT)
Dept: ORTHOPEDIC SURGERY | Age: 79
End: 2019-11-19

## 2019-11-19 NOTE — TELEPHONE ENCOUNTER
Regarding:   AMB SUPPLY ORDER [[id number]] (Order [id number])     Custom bilateral trilaminar diabetic orthotic insert with medial posting (R[facility]). Patient advised the place that [facility] sent her to for her orthotic doesn't make them for diabetics. She needs the order sent somewhere else. I asked her the name of the company and she couldn't remember the name.      Patient can be reached at: [phone]  Or [phone]

## 2019-11-20 NOTE — TELEPHONE ENCOUNTER
I called [facility] and spoke with Ros Short and he said that they do make these.  The order was faxed to him at [phone] and patient was contacted and given their information

## 2020-01-20 ENCOUNTER — OFFICE VISIT (OUTPATIENT)
Dept: ORTHOPEDIC SURGERY | Age: 80
End: 2020-01-20

## 2020-02-10 ENCOUNTER — DOCUMENTATION ONLY (OUTPATIENT)
Dept: ORTHOPEDIC SURGERY | Age: 80
End: 2020-02-10

## 2020-02-10 NOTE — PROGRESS NOTES
[facility] form completed, copy to scanning and faxed. Form available for  at [facility] location.

## 2020-05-26 ENCOUNTER — TELEPHONE (OUTPATIENT)
Dept: ORTHOPEDIC SURGERY | Age: 80
End: 2020-05-26

## 2020-05-26 NOTE — TELEPHONE ENCOUNTER
Spoke with patient. Patient states she is having pain from her back all the way down to her foot on the right side. Patient states \"the arch has flattened and twisted to the inside\" Patient denies any tingling or burning in the foot. Patient is asking if there are any exercises she can do or if she should follow up with Dr. Tyler Infante or Dr. Nate Salinas.

## 2020-05-26 NOTE — TELEPHONE ENCOUNTER
Patient called wishing to speak to [facility] nurse regarding a matter she did not wish to discuss with me.  Please advise patient at [phone]

## 2020-05-27 NOTE — TELEPHONE ENCOUNTER
Returned call to patient and she states that she has been in contact with her PCP who does not want her to go out to  the custom orthotic (which is ready for ) due to COVID-19. I advised patient from our perspective, she should follower the advise of her PCP, but from our perspective, she can  orthotic with proper precautions. Patient states that she will contact her PCP and call back if she has any further questions.      Petr Lopez PA-C, [facility]  5/27/2020  4:54 PM

## 2020-06-11 NOTE — TELEPHONE ENCOUNTER
Patient called and said that her PCP told her she has Nerve damage from the back all the way down her Right knee and Right Foot. Patient would like to know if this is something  can see her for or if she would have to schedule an appt with the Spine Specialist.    Patient tel. [phone]. Note: patient last seen 11/06/2019 for [facility] Foot Pain by .

## 2020-06-11 NOTE — TELEPHONE ENCOUNTER
Patient contacted and told this and she said she would call tomorrow and make a follow up with Dr Betty Da Silva

## 2020-06-25 ENCOUNTER — TELEPHONE (OUTPATIENT)
Dept: ORTHOPEDIC SURGERY | Age: 80
End: 2020-06-25

## 2020-06-25 NOTE — TELEPHONE ENCOUNTER
Patient called wishing to speak with a nurse regarding the possibility of buying a brace for her leg but was unsure because she has varicose veins in her legs and did not want to do something wrong.  Please advise patient at [phone]

## 2020-06-25 NOTE — TELEPHONE ENCOUNTER
What sort of brace is she wanting? I would maybe check with her PCP.  Varicose veins should not be affected by a knee brace or sleeve however

## 2020-07-06 ENCOUNTER — OFFICE VISIT (OUTPATIENT)
Dept: ORTHOPEDIC SURGERY | Age: 80
End: 2020-07-06

## 2020-07-06 VITALS
OXYGEN SATURATION: 98 % | BODY MASS INDEX: 38.74 KG/M2 | WEIGHT: 240 LBS | HEART RATE: 70 BPM | TEMPERATURE: 98.2 F | SYSTOLIC BLOOD PRESSURE: 165 MMHG | DIASTOLIC BLOOD PRESSURE: 70 MMHG

## 2020-07-06 DIAGNOSIS — M48.062 SPINAL STENOSIS OF LUMBAR REGION WITH NEUROGENIC CLAUDICATION: Primary | ICD-10-CM

## 2020-07-06 DIAGNOSIS — M25.559 HIP PAIN: ICD-10-CM

## 2020-07-06 DIAGNOSIS — G60.8 IDIOPATHIC SENSORIMOTOR AXONAL NEUROPATHY: ICD-10-CM

## 2020-07-06 RX ORDER — GABAPENTIN 100 MG/1
CAPSULE ORAL
Qty: 120 CAP | Refills: 2 | Status: SHIPPED | OUTPATIENT
Start: 2020-07-06 | End: 2022-08-13

## 2020-07-06 NOTE — PROGRESS NOTES
[facility] [address]  [address]  [place], [facility] [address]  Phone: [phone]  Fax: [phone]        Yoanna Victoria  : 1940  PCP: Ilsa Barron MD    PROGRESS NOTE      ASSESSMENT AND PLAN    Diagnoses and all orders for this visit:    1. Spinal stenosis of lumbar region with neurogenic claudication  -     gabapentin (NEURONTIN) 100 mg capsule; One to two caps by mouth bid prn nerve pain  Indications: neuropathic pain    2. 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 Hip pain      1. 78 y.o. female w/above dx. No progressive radiculopathy. 2. Advised to continue HEP  3. Please note that Gabapentin is a controlled substance in [place] as of 19. This is not a narcotic medication, but has some abuse and addiction potential, especially when combined with opioids. 4. Trial of Gabapentin 100 mg 1-2 tabs BID  5. Continue Tylenol PRN  6. Given information on sciatica and hip exercises    Follow-up and Dispositions    · Return in about 3 months (around 10/6/2020). HISTORY OF PRESENT ILLNESS  James Soler is a 78 y.o. female. Pt was last evaluated 2019 for lumbar stenosis. Referred to PT. She reports not attending any PT due to [facility]. Pt states that her back has been hurting more than usual, and she notes that her upper back now hurts as well. She describes not being able to lay on her R side. Reports a burning sensation around the back of her rib cage to underneath her breasts, where she finds relief by massaging it. She inquired about wearing shoes with support inserts that cause back pain. Denies previously trying Aspercreme.      Pain Assessment  2020   Location of Pain Back   Pain Location Comment -   Location Modifiers -   Severity of Pain 10   Quality of Pain Burning   Quality of Pain Comment - Duration of Pain -   Frequency of Pain Constant   Date Pain First Started -   Aggravating Factors -   Aggravating Factors Comment -   Limiting Behavior -   Relieving Factors -   Relieving Factors Comment -   Result of Injury -   Work-Related Injury -   Type of Injury -   Type of Injury Comment -       Does pain radiate into extremities: BLE R>L pain. Does pt have numbness/tingling: paresthesias R>L buttock to foot  Does patient have weakness: none  Pt denies saddle paresthesias.    Medications pt is on: Tylenol QHS with benefit. Pt denies recent ED visits or hospitalizations. Denies persistent fevers, chills, weight changes, neurogenic bowel or bladder symptoms. Treatments patient has tried:  Physical therapy:Yes low back 2017  Doing HEP: Yes; stretches  Non-opioid medications: Yes Failed Cymbalta - chest pain. Unable to take MDP. Gabapentin-fear of side effects  Spinal injections: No    Spinal surgery: No.     Last L MRI '19: Mild stenosis L3-4      reviewed. Hx of renal cell carcinoma, neuropathy, pre-DM. Pt reports she has a fallen arch and uses a brace to prevent it from turning excessively. This is a pt of Dr. Keenan Prater, has a Dx of fibromyalgia and chronic ankle pain. She also consults with Dr. Antonieta Bonilla has a -aid come out 3 times per week. 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 9)     Postoperative respiratory failure ([address] 9/30/10    Pre-diabetes     11/16 diet controlled    Renal cell cancer ([address]     Stage T1NxMx    Respiratory failure, post-operative ([address]     Wears glasses        Past Surgical History:   Procedure Laterality Date    HX BREAST BIOPSY      HX BREAST REDUCTION      HX CATARACT REMOVAL      HX CHOLECYSTECTOMY      HX HERNIA REPAIR      HX HYSTERECTOMY  1984     total vaginal    HX OTHER SURGICAL  9/2010    SIGMOIDECTOMY    HX VEIN STRIPPING     . MEDICATIONS      Current Outpatient Medications   Medication Sig Dispense Refill    gabapentin (NEURONTIN) 100 mg capsule One to two caps by mouth bid prn nerve pain  Indications: neuropathic pain 120 Cap 2    calcium carbonate (TUMS) 200 mg calcium (500 mg) chew Take 500 mg by mouth.  dicyclomine (BENTYL) 10 mg capsule       omeprazole (PRILOSEC) 40 mg capsule Take 40 mg by mouth daily.  fluticasone (FLONASE) 50 mcg/actuation nasal spray       metoprolol succinate (TOPROL-XL) 200 mg XL tablet Take 1 Tab by mouth daily. 90 Tab 2    doxazosin (CARDURA) 4 mg tablet Take 1 Tab by mouth nightly. 90 Tab 2    acetaminophen (TYLENOL EXTRA STRENGTH) 500 mg tablet Take  by mouth every six (6) hours as needed for Pain.  ACCU-CHEK SOFTCLIX LANCETS misc       ACCU-CHEK TAMY PLUS TEST STRP strip       amLODIPine (NORVASC) 10 mg tablet Take  by mouth daily.  Hydrochlorothiazide 12.5 mg tablet Take 12.5 mg by mouth every other day.  azelastine (ASTELIN) 137 mcg (0.1 %) nasal spray           Controlled Substance Monitoring:    No flowsheet data found.      ALLERGIES    Allergies   Allergen Reactions    Aspirin Other (comments)    Codeine Other (comments)     Gi distress      Cymbalta [Duloxetine] Other (comments)     Chest pain    Darvon [Propoxyphene] Rash and Itching     Gi distress    Dextromethorphan Other (comments)     Stomach cramps    Meperidine Hives     Gi distress    Morphine Other (comments)     Neurological reaction    Other Medication Palpitations     Muscle relaxants    Pcn [Penicillins] Swelling    Propoxyphene N-Acetaminophen Hives    Sulfa (Sulfonamide Antibiotics) Hives          SOCIAL HISTORY    Social History     Socioeconomic History    Marital status:      Spouse name: Not on file    Number of children: Not on file    Years of education: Not on file    Highest education level: Not on file   Occupational History    Not on file   Social Needs    Financial resource strain: Not on file    Food insecurity     Worry: Not on file     Inability: Not on file    Transportation needs     Medical: Not on file     Non-medical: Not on file   Tobacco Use    Smoking status: Never Smoker    Smokeless tobacco: Never Used   Substance and Sexual Activity    Alcohol use: No    Drug use: No    Sexual activity: Not on file   Lifestyle    Physical activity     Days per week: Not on file     Minutes per session: Not on file    Stress: Not on file   Relationships    Social connections     Talks on phone: Not on file     Gets together: Not on file     Attends Evangelical service: Not on file     Active member of club or organization: Not on file     Attends meetings of clubs or organizations: Not on file     Relationship status: Not on file    Intimate partner violence     Fear of current or ex partner: Not on file     Emotionally abused: Not on file     Physically abused: Not on file     Forced sexual activity: Not on file   Other Topics Concern    Not on file   Social History Narrative    Not on file       FAMILY HISTORY    Family History   Problem Relation Age of Onset    Heart Attack Mother 79    Diabetes Other     Hypertension Other     Arthritis-osteo Other     Heart Disease Other     Heart Attack Sister 71       REVIEW OF SYSTEMS  Review of Systems   Constitutional: Negative for chills, fever and weight loss. Respiratory: Negative for shortness of breath. Cardiovascular: Negative for chest pain. Gastrointestinal: Negative for constipation. Negative for fecal incontinence   Genitourinary: Negative for dysuria.         Negative for urinary incontinence   Musculoskeletal: Positive for back pain and joint pain. Per HPI   Skin: Negative for rash. Neurological: Positive for tingling and sensory change. Negative for dizziness, tremors, focal weakness and headaches. Endo/Heme/Allergies: Does not bruise/bleed easily. Psychiatric/Behavioral: The patient does not have insomnia. PHYSICAL EXAMINATION  Visit Vitals  /70 (BP 1 Location: Left arm, BP Patient Position: Sitting)   Pulse 70   Temp 98.2 °F (36.8 °C) (Oral)   Wt 240 lb (108.9 kg)   SpO2 98%   BMI 38.74 kg/m²         Accompanied by family member. Constitutional:  Well developed, well nourished, in no acute distress. Psychiatric: Affect and mood are appropriate. Integumentary: No rashes or abrasions noted on exposed areas. Cardiovascular/Peripheral Vascular: No peripheral edema is noted BLE. SPINE/MUSCULOSKELETAL EXAM    R medial knee joint tenderness    Thoracic spine:  Hypersensitivity in L lower thoracic rib cage. No skin lesions noted. Lumbar spine:  No rash, ecchymosis, or gross obliquity. No fasciculations. No focal atrophy is noted. Tenderness to palpation B/L L4-sacrum. Tenderness to palpation at the L>R sciatic notch. SI joints non-tender. R>L tochanters tender. MOTOR:      Biceps  Triceps Deltoids Wrist Ext Wrist Flex Hand Intrin   Right +4/5 +4/5 +4/5 +4/5 +4/5 +4/5   Left +4/5 +4/5 +4/5 +4/5 +4/5 +4/5        Hip Flex  Quads Hamstrings Ankle DF EHL Ankle PF   Right +4/5 +4/5 +4/5 +4/5 +4/5 +4/5   Left +4/5 +4/5 +4/5 +4/5 +4/5 +4/5     Back pain with hip ROM on right. Groin pain with hip ROM on left. Straight Leg raise negative. Ambulation with single point cane. FWB. Written by Cheryl Mcnair, as dictated by Harjit Paniagua MD.    I, Dr. Harjit Paniagua MD, confirm that all documentation is accurate. Ms. Meaghan Bryant may have a reminder for a \"due or due soon\" health maintenance.  I have asked that she contact her primary care provider for follow-up on this health maintenance.

## 2020-07-06 NOTE — PATIENT INSTRUCTIONS
Sciatica: Exercises  Introduction  Here are some examples of typical rehabilitation exercises for your condition. Start each exercise slowly. Ease off the exercise if you start to have pain. Your doctor or physical therapist will tell you when you can start these exercises and which ones will work best for you. When you are not being active, find a comfortable position for rest. Some people are comfortable on the floor or a medium-firm bed with a small pillow under their head and another under their knees. Some people prefer to lie on their side with a pillow between their knees. Don't stay in one position for too long. Take short walks (10 to 20 minutes) every 2 to 3 hours. Avoid slopes, hills, and stairs until you feel better. Walk only distances you can manage without pain, especially leg pain. How to do the exercises  Back stretches   1. Get down on your hands and knees on the floor. 2. Relax your head and allow it to droop. Round your back up toward the ceiling until you feel a nice stretch in your upper, middle, and lower back. Hold this stretch for as long as it feels comfortable, or about 15 to 30 seconds. 3. Return to the starting position with a flat back while you are on your hands and knees. 4. Let your back sway by pressing your stomach toward the floor. Lift your buttocks toward the ceiling. 5. Hold this position for 15 to 30 seconds. 6. Repeat 2 to 4 times. Follow-up care is a key part of your treatment and safety. Be sure to make and go to all appointments, and call your doctor if you are having problems. It's also a good idea to know your test results and keep a list of the medicines you take. Where can you learn more? Go to http://angie-dayanara.info/  Enter S673 in the search box to learn more about \"Sciatica: Exercises. \"  Current as of: March 2, 2020               Content Version: 12.5  © 2907-1794 Healthwise, Incorporated.    Care instructions adapted under license by [address] (which disclaims liability or warranty for this information). If you have questions about a medical condition or this instruction, always ask your healthcare professional. [address] any warranty or liability for your use of this information. Hip Arthritis: Exercises  Introduction  Here are some examples of exercises for you to try. The exercises may be suggested for a condition or for rehabilitation. Start each exercise slowly. Ease off the exercises if you start to have pain. You will be told when to start these exercises and which ones will work best for you. How to do the exercises  Straight-leg raises to the outside   7. Lie on your side, with your affected hip on top. 8. Tighten the front thigh muscles of your top leg to keep your knee straight. 9. Keep your hip and your leg straight in line with the rest of your body, and keep your knee pointing forward. Do not drop your hip back. 10. Lift your top leg straight up toward the ceiling, about 12 inches off the floor. Hold for about 6 seconds, then slowly lower your leg. 11. Repeat 8 to 12 times. 12. Switch legs and repeat steps 1 through 5, even if only one hip is sore. Straight-leg raises to the inside   1. Lie on your side with your affected hip on the floor. 2. You can either prop up your other leg on a chair, or you can bend that knee and put that foot in front of your other knee. Do not drop your hip back. 3. Tighten the muscles on the front thigh of your bottom leg to straighten that knee. 4. Keep your kneecap pointing forward and your leg straight, and lift your bottom leg up toward the ceiling about 6 inches. Hold for about 6 seconds, then lower slowly. 5. Repeat 8 to 12 times. 6. Switch legs and repeat steps 1 through 5, even if only one hip is sore. Hip hike   1. Stand sideways on the bottom step of a staircase, and hold on to the banister or wall.   2. Keeping both knees straight, lift your good leg off the step and let it hang down. Then hike your good hip up to the same level as your affected hip or a little higher. 3. Repeat 8 to 12 times. 4. Switch legs and repeat steps 1 through 3, even if only one hip is sore. Bridging   1. Lie on your back with both knees bent. Your knees should be bent about 90 degrees. 2. Then push your feet into the floor, squeeze your buttocks, and lift your hips off the floor until your shoulders, hips, and knees are all in a straight line. 3. Hold for about 6 seconds as you continue to breathe normally, and then slowly lower your hips back down to the floor and rest for up to 10 seconds. 4. Repeat 8 to 12 times. Hamstring stretch (lying down)   1. Lie flat on your back with your legs straight. If you feel discomfort in your back, place a small towel roll under your lower back. 2. Holding the back of your affected leg, lift your leg straight up and toward your body until you feel a stretch at the back of your thigh. 3. Hold the stretch for at least 30 seconds. 4. Repeat 2 to 4 times. 5. Switch legs and repeat steps 1 through 4, even if only one hip is sore. Standing quadriceps stretch   1. If you are not steady on your feet, hold on to a chair, counter, or wall. You can also lie on your stomach or your side to do this exercise. 2. Bend the knee of the leg you want to stretch, and reach behind you to grab the front of your foot or ankle with the hand on the same side. For example, if you are stretching your right leg, use your right hand. 3. Keeping your knees next to each other, pull your foot toward your buttock until you feel a gentle stretch across the front of your hip and down the front of your thigh. Your knee should be pointed directly to the ground, and not out to the side. 4. Hold the stretch for at least 15 to 30 seconds. 5. Repeat 2 to 4 times. 6. Switch legs and repeat steps 1 through 5, even if only one hip is sore. Hip rotator stretch   1. Lie on your back with both knees bent and your feet flat on the floor. 2. Put the ankle of your affected leg on your opposite thigh near your knee. 3. Use your hand to gently push your knee away from your body until you feel a gentle stretch around your hip. 4. Hold the stretch for 15 to 30 seconds. 5. Repeat 2 to 4 times. 6. Repeat steps 1 through 5, but this time use your hand to gently pull your knee toward your opposite shoulder. 7. Switch legs and repeat steps 1 through 6, even if only one hip is sore. Knee-to-chest   1. Lie on your back with your knees bent and your feet flat on the floor. 2. Bring your affected leg to your chest, keeping the other foot flat on the floor (or keeping the other leg straight, whichever feels better on your lower back). 3. Keep your lower back pressed to the floor. Hold for at least 15 to 30 seconds. 4. Relax, and lower the knee to the starting position. 5. Repeat 2 to 4 times. 6. Switch legs and repeat steps 1 through 5, even if only one hip is sore. 7. To get more stretch, put your other leg flat on the floor while pulling your knee to your chest.    Clamshell   1. Lie on your side, with your affected hip on top. Keep your feet and knees together and your knees bent. 2. Raise your top knee, but keep your feet together. Do not let your hips roll back. Your legs should open up like a clamshell. 3. Hold for 6 seconds. 4. Slowly lower your knee back down. Rest for 10 seconds. 5. Repeat 8 to 12 times. 6. Switch legs and repeat steps 1 through 5, even if only one hip is sore. Follow-up care is a key part of your treatment and safety. Be sure to make and go to all appointments, and call your doctor if you are having problems. It's also a good idea to know your test results and keep a list of the medicines you take. Where can you learn more?   Go to http://angie-dayanara.info/  Enter [id number] in the search box to learn more about \"Hip Arthritis: Exercises. \"  Current as of: March 2, 2020               Content Version: 12.5  © 2006-2020 Healthwise, Incorporated. Care instructions adapted under license by [employer] (which disclaims liability or warranty for this information). If you have questions about a medical condition or this instruction, always ask your healthcare professional. [place] [address] any warranty or liability for your use of this information.

## 2020-10-26 ENCOUNTER — OFFICE VISIT (OUTPATIENT)
Dept: ORTHOPEDIC SURGERY | Age: 80
End: 2020-10-26
Payer: MEDICARE

## 2020-10-26 VITALS
SYSTOLIC BLOOD PRESSURE: 135 MMHG | TEMPERATURE: 96.6 F | HEIGHT: 66 IN | HEART RATE: 71 BPM | WEIGHT: 237 LBS | OXYGEN SATURATION: 97 % | BODY MASS INDEX: 38.09 KG/M2 | DIASTOLIC BLOOD PRESSURE: 66 MMHG

## 2020-10-26 DIAGNOSIS — M48.061 LUMBAR STENOSIS WITHOUT NEUROGENIC CLAUDICATION: Primary | ICD-10-CM

## 2020-10-26 DIAGNOSIS — G60.8 IDIOPATHIC SENSORIMOTOR AXONAL NEUROPATHY: ICD-10-CM

## 2020-10-26 PROCEDURE — G8754 DIAS BP LESS 90: HCPCS | Performed by: PHYSICAL MEDICINE & REHABILITATION

## 2020-10-26 PROCEDURE — G8427 DOCREV CUR MEDS BY ELIG CLIN: HCPCS | Performed by: PHYSICAL MEDICINE & REHABILITATION

## 2020-10-26 PROCEDURE — 1101F PT FALLS ASSESS-DOCD LE1/YR: CPT | Performed by: PHYSICAL MEDICINE & REHABILITATION

## 2020-10-26 PROCEDURE — G8752 SYS BP LESS 140: HCPCS | Performed by: PHYSICAL MEDICINE & REHABILITATION

## 2020-10-26 PROCEDURE — G8432 DEP SCR NOT DOC, RNG: HCPCS | Performed by: PHYSICAL MEDICINE & REHABILITATION

## 2020-10-26 PROCEDURE — G8399 PT W/DXA RESULTS DOCUMENT: HCPCS | Performed by: PHYSICAL MEDICINE & REHABILITATION

## 2020-10-26 PROCEDURE — G8536 NO DOC ELDER MAL SCRN: HCPCS | Performed by: PHYSICAL MEDICINE & REHABILITATION

## 2020-10-26 PROCEDURE — 1090F PRES/ABSN URINE INCON ASSESS: CPT | Performed by: PHYSICAL MEDICINE & REHABILITATION

## 2020-10-26 PROCEDURE — G8417 CALC BMI ABV UP PARAM F/U: HCPCS | Performed by: PHYSICAL MEDICINE & REHABILITATION

## 2020-10-26 PROCEDURE — 99213 OFFICE O/P EST LOW 20 MIN: CPT | Performed by: PHYSICAL MEDICINE & REHABILITATION

## 2020-10-26 NOTE — PATIENT INSTRUCTIONS

## 2020-10-26 NOTE — PROGRESS NOTES
[facility] [address].  [address], [address]  [place], [address] [place] [address] Street  Phone: [phone]  Fax: [phone]        Ashwini Squires  : 1940  PCP: Zoie Manzano MD    PROGRESS NOTE      ASSESSMENT AND PLAN    Diagnoses and all orders for this visit:    1. Lumbar stenosis without neurogenic claudication    2. 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 78 y.o. female symptomatic, cautious about medications. 2. Advised to continue HEP  3. Advised to start Gabapentin  4. No inidcations for injections or surgery  5. Given information on low back exercises    Follow-up and Dispositions    · Return if symptoms worsen or fail to improve. HISTORY OF PRESENT ILLNESS  Evette Loredo is a 78 y.o. female. Pt was last evaluated 2020 for lumbar stenosis. Trial of Gabapentin. Pt states that she hasn't taken the Gabapentin because she just got 2 additional prescriptions. Notes that she will try the Gabapentin soon because she has been in more pain recently. Pt describes her pain as achy, and she admits to some numbness/tingling in her RLE and feet. She affirms experiencing insomnia due to night sweats. Pain Assessment  10/26/2020   Location of Pain Back   Pain Location Comment -   Location Modifiers -   Severity of Pain 10   Quality of Pain Aching; Other (Comment)   Quality of Pain Comment N/T in right leg/ foot   Duration of Pain -   Frequency of Pain Constant   Date Pain First Started -   Aggravating Factors -   Aggravating Factors Comment -   Limiting Behavior -   Relieving Factors -   Relieving Factors Comment -   Result of Injury -   Work-Related Injury -   Type of Injury -   Type of Injury Comment -     Does pain radiate into extremities: R>LLE pain.    Does pt have numbness/tingling: RLE and B/L feet  Does patient have weakness: RLE  Pt denies saddle paresthesias.    Medications pt is on: Tylenol QHS with benefit. Gabapentin 100 mg 1-2 tab BID, not taking yet. Denies persistent fevers, chills, weight changes, neurogenic bowel or bladder symptoms.      Treatments patient has tried:  Physical therapy:Yes low HUMM 6586  Doing HEP: Yes; stretches  Non-opioid medications: Yes Failed Cymbalta - chest pain. Unable to take MDP. Gabapentin-fear of side effects  Spinal injections: No    Spinal surgery: No.      Last L MRI '19: Mild stenosis L3-4      reviewed. Hx of renal cell carcinoma, neuropathy, pre-DM. Pt reports she has a fallen arch and uses a brace to prevent it from turning excessively. This is a pt of Dr. Marguerite Aschoff, has a Dx of fibromyalgia and chronic ankle pain. She also consults with Dr. Anil Pizano has a -aid come out 3 times per week.     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 9)     Postoperative respiratory failure ([address] 9/30/10    Pre-diabetes     11/16 diet controlled    Renal cell cancer ([address]     Stage T1NxMx    Respiratory failure, post-operative ([address]     Wears glasses        Past Surgical History:   Procedure Laterality Date    HX BREAST BIOPSY      HX BREAST REDUCTION      HX CATARACT REMOVAL      HX CHOLECYSTECTOMY      HX HERNIA REPAIR      HX HYSTERECTOMY  1984     total vaginal    HX OTHER SURGICAL  9/2010    SIGMOIDECTOMY    HX VEIN STRIPPING     . MEDICATIONS      Current Outpatient Medications   Medication Sig Dispense Refill    calcium carbonate (TUMS) 200 mg calcium (500 mg) chew Take 500 mg by mouth.       dicyclomine (BENTYL) 10 mg capsule       fluticasone (FLONASE) 50 mcg/actuation nasal spray       metoprolol succinate (TOPROL-XL) 200 mg XL tablet Take 1 Tab by mouth daily. 90 Tab 2    doxazosin (CARDURA) 4 mg tablet Take 1 Tab by mouth nightly. 90 Tab 2    acetaminophen (TYLENOL EXTRA STRENGTH) 500 mg tablet Take  by mouth every six (6) hours as needed for Pain.  ACCU-CHEK SOFTCLIX LANCETS misc       ACCU-CHEK TAMY PLUS TEST STRP strip       amLODIPine (NORVASC) 10 mg tablet Take  by mouth daily.  Hydrochlorothiazide 12.5 mg tablet Take 25 mg by mouth daily.  gabapentin (NEURONTIN) 100 mg capsule One to two caps by mouth bid prn nerve pain  Indications: neuropathic pain 120 Cap 2    azelastine (ASTELIN) 137 mcg (0.1 %) nasal spray           Controlled Substance Monitoring:    No flowsheet data found.      ALLERGIES    Allergies   Allergen Reactions    Aspirin Other (comments)    Codeine Other (comments)     Gi distress      Cymbalta [Duloxetine] Other (comments)     Chest pain    Darvon [Propoxyphene] Rash and Itching     Gi distress    Dextromethorphan Other (comments)     Stomach cramps    Meperidine Hives     Gi distress    Morphine Other (comments)     Neurological reaction    Other Medication Palpitations     Muscle relaxants    Pcn [Penicillins] Swelling    Propoxyphene N-Acetaminophen Hives    Sulfa (Sulfonamide Antibiotics) Hives          SOCIAL HISTORY    Social History     Socioeconomic History    Marital status:      Spouse name: Not on file    Number of children: Not on file    Years of education: Not on file    Highest education level: Not on file   Occupational History    Not on file   Social Needs    Financial resource strain: Not on file    Food insecurity     Worry: Not on file     Inability: Not on file    Transportation needs     Medical: Not on file     Non-medical: Not on file   Tobacco Use    Smoking status: Never Smoker    Smokeless tobacco: Never Used   Substance and Sexual Activity    Alcohol use: No    Drug use: No    Sexual activity: Not on file   Lifestyle    Physical activity     Days per week: Not on file     Minutes per session: Not on file    Stress: Not on file   Relationships    Social connections     Talks on phone: Not on file     Gets together: Not on file     Attends Scientologist service: Not on file     Active member of club or organization: Not on file     Attends meetings of clubs or organizations: Not on file     Relationship status: Not on file    Intimate partner violence     Fear of current or ex partner: Not on file     Emotionally abused: Not on file     Physically abused: Not on file     Forced sexual activity: Not on file   Other Topics Concern    Not on file   Social History Narrative    Not on file       FAMILY HISTORY    Family History   Problem Relation Age of Onset    Heart Attack Mother 79    Diabetes Other     Hypertension Other     Arthritis-osteo Other     Heart Disease Other     Heart Attack Sister 71       REVIEW OF SYSTEMS  Review of Systems   Constitutional: Negative for chills, fever and weight loss. Respiratory: Negative for shortness of breath. Cardiovascular: Negative for chest pain. Gastrointestinal: Negative for constipation. Negative for fecal incontinence   Genitourinary: Negative for dysuria. Negative for urinary incontinence   Musculoskeletal: Positive for back pain. Per HPI   Skin: Negative for rash. Neurological: Negative for dizziness, tingling, tremors, focal weakness and headaches. Endo/Heme/Allergies: Does not bruise/bleed easily. Psychiatric/Behavioral: The patient has insomnia. PHYSICAL EXAMINATION  Visit Vitals  /66 (BP 1 Location: Right arm, BP Patient Position: Sitting)   Pulse 71   Temp (!) 96.6 °F (35.9 °C) (Oral)   Ht 5' 6\" (1.676 m)   Wt 237 lb (107.5 kg)   SpO2 97%   BMI 38.25 kg/m²       Accompanied by family member. Constitutional:  Well developed, well nourished, in no acute distress. Psychiatric: Affect and mood are appropriate.    Integumentary: No rashes or abrasions noted on exposed areas. Cardiovascular/Peripheral Vascular: pedal edema is noted bilaterally. SPINE/MUSCULOSKELETAL EXAM    Negative Mccoy's    Lumbar spine:  No rash, ecchymosis, or gross obliquity. No fasciculations. No focal atrophy is noted. Tenderness to palpation R SIJ, R sciatic notch. Trochanters non tender. MOTOR:       Hip Flex  Quads Hamstrings Ankle DF EHL Ankle PF   Right +4/5 +4/5 +4/5 +4/5 +4/5 +4/5   Left +4/5 +4/5 +4/5 +4/5 +4/5 +4/5   DTRs are hypoactive biceps, triceps, brachioradialis. Bursa pain with L hip ROM. Straight Leg raise negative. Ambulation without assistive device. FWB. Written by Sydnee Holder, as dictated by Justice Root MD.    I, Dr. Justice Root MD, confirm that all documentation is accurate. Ms. Mee Roach may have a reminder for a \"due or due soon\" health maintenance. I have asked that she contact her primary care provider for follow-up on this health maintenance.

## 2021-05-03 ENCOUNTER — OFFICE VISIT (OUTPATIENT)
Dept: ORTHOPEDIC SURGERY | Age: 81
End: 2021-05-03
Payer: MEDICARE

## 2021-05-03 VITALS
OXYGEN SATURATION: 98 % | HEART RATE: 72 BPM | TEMPERATURE: 97.2 F | BODY MASS INDEX: 37.93 KG/M2 | HEIGHT: 66 IN | WEIGHT: 236 LBS

## 2021-05-03 DIAGNOSIS — M79.671 BILATERAL FOOT PAIN: ICD-10-CM

## 2021-05-03 DIAGNOSIS — M79.672 BILATERAL FOOT PAIN: ICD-10-CM

## 2021-05-03 DIAGNOSIS — Q66.6 PES PLANOVALGUS: Primary | ICD-10-CM

## 2021-05-03 PROCEDURE — 1090F PRES/ABSN URINE INCON ASSESS: CPT | Performed by: ORTHOPAEDIC SURGERY

## 2021-05-03 PROCEDURE — G8536 NO DOC ELDER MAL SCRN: HCPCS | Performed by: ORTHOPAEDIC SURGERY

## 2021-05-03 PROCEDURE — 73630 X-RAY EXAM OF FOOT: CPT | Performed by: ORTHOPAEDIC SURGERY

## 2021-05-03 PROCEDURE — 99213 OFFICE O/P EST LOW 20 MIN: CPT | Performed by: ORTHOPAEDIC SURGERY

## 2021-05-03 PROCEDURE — G8756 NO BP MEASURE DOC: HCPCS | Performed by: ORTHOPAEDIC SURGERY

## 2021-05-03 PROCEDURE — G8432 DEP SCR NOT DOC, RNG: HCPCS | Performed by: ORTHOPAEDIC SURGERY

## 2021-05-03 PROCEDURE — G8399 PT W/DXA RESULTS DOCUMENT: HCPCS | Performed by: ORTHOPAEDIC SURGERY

## 2021-05-03 PROCEDURE — G8417 CALC BMI ABV UP PARAM F/U: HCPCS | Performed by: ORTHOPAEDIC SURGERY

## 2021-05-03 PROCEDURE — G8427 DOCREV CUR MEDS BY ELIG CLIN: HCPCS | Performed by: ORTHOPAEDIC SURGERY

## 2021-05-03 PROCEDURE — 1101F PT FALLS ASSESS-DOCD LE1/YR: CPT | Performed by: ORTHOPAEDIC SURGERY

## 2021-05-03 RX ORDER — PANTOPRAZOLE SODIUM 40 MG/1
40 TABLET, DELAYED RELEASE ORAL 2 TIMES DAILY
COMMUNITY
Start: 2021-03-04

## 2021-05-03 RX ORDER — DICLOFENAC SODIUM 10 MG/G
4 GEL TOPICAL 4 TIMES DAILY
Qty: 4 G | Refills: 0 | Status: SHIPPED | OUTPATIENT
Start: 2021-05-03 | End: 2022-08-13

## 2021-05-03 NOTE — PROGRESS NOTES
AMBULATORY PROGRESS NOTE      Patient: Kulwnider Quigley             MRN: [id number]     SSN: [id number] Body mass index is 38.09 kg/m². YOB: 1940     AGE: 78 y.o. EX: female    PCP: Mechelle Hairston MD       IMPRESSION //  DIAGNOSIS AND TREATMENT PLAN        Kulwinder Quigley has a diagnosis of: So she has some pes planovalgus alignment. 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 She is diabetic. She will benefit from diabetic inserts and orthotics. As such recommendations are listed as below. DIAGNOSES  1. Pes planovalgus    2. Bilateral foot pain        Orders Placed This Encounter    Generic Supply Order     Diabetic shoes    Bilateral medially-posted diabetic inserts(more medially posted on the R side)    [20841] Foot Min 3V     Order Specific Question:   Weight bearing? Answer:   No    [19535] Foot Min 3V     Order Specific Question:   Weight bearing? Answer:   No    REFERRAL TO PODIATRY     Referral Priority:   Routine     Referral Type:   Consultation     Referral Reason:   Specialty Services Required     Referred to Provider:   Aparna Alcazar DPM     Requested Specialty:   Podiatry     Number of Visits Requested:   1    pantoprazole (PROTONIX) 40 mg tablet    diclofenac (VOLTAREN) 1 % gel     Sig: Apply 4 g to affected area four (4) times daily. Dispense:  4 g     Refill:  0        PLAN:    1. I will order a 3-view XR of each foot in the office today. 2. I will provide a referral to a podiatrist, Dany Hendrickson For toenail plate care. 3. I will write a DME order: Diabetic shoes, Bilateral medially-posted diabetic inserts(more medially- posted on the R side). 4. I will not write a Rx of any anti- inflammatories as pt has gastric issues; instead I will write a Rx of Voltaren gel to rub on the bottom of the left foot; 4x a day.        RTO-  5 weeks    Kulwinder Quigley  expresses understanding of the diagnosis, treatment plan, and all of their proposed questions were answered to their satisfaction. Patient education has been provided re the diagnoses. Mita Garcia may have a reminder for a \"due or due soon\" health maintenance. I have asked that she contact her primary care provider for follow-up on this health maintenance. HPI //  Emilee IS A 78 y.o. female who is a/an  established patient, presenting to my outpatient office for evaluation of  the following chief complaint(s):     Chief Complaint   Patient presents with    Foot Pain     bilateral       Patient was last evaluated in 2019. At today's OV, patient reports occasional, left plantar medical arch foot pain that lasts for a few minutes. Patient also reports right anteriorolateral pain that is contingent on certain shoes. Patients mentions she is more comfortable standing with both of her legs rotated laterally. She notes she cannot cut her toenails and would like to be referred to a podiatrist.     Visit Vitals  Pulse 72   Temp 97.2 °F (36.2 °C) (Skin)   Ht 5' 6\" (1.676 m)   Wt 236 lb (107 kg)   SpO2 98%   BMI 38.09 kg/m²       Appearance: Alert, well appearing and pleasant patient who is in no distress, oriented to person, place/time, and who follows commands. This patient is accompanied in the examination room by her  self. There is signs of: no dementia  Psychiatric: Affect/mood are appropriate. Speech normal in context and clarity, memory intact grossly, no involuntary movements - tremors. Patient arrives to office via: with assistive device: [address] (2): Head normocephalic & atraumatic.   Eye: pupils are round// EOM are intact // Neck: ROM WNL  // Hearings Intact   Respiratory: Breathing non labored     ANKLE/FOOT bilateral     Gait: normal  Tenderness: mild over the R sinus tarsi, mild over the the left plantar fascia and 1st TMT level, mild over the left FHL plantar midfoot  Cutaneous: mild swelling in the bilateral soft tissue of the right ankle  Joint Motion: normal, bilateral ankle and hindfoot ROM   Joint / Tendon Stability: No Ankle or Subtalar instability or joint laxity. No peroneal sublux ability or dislocation  Alignment: more planovalgus in the right foot than the left foot   Neuro Motor/Sensory: NL/NL  Vascular: NL foot/ankle pulses,   Lymphatics: No extremity lymphedema, No calf swelling, no tenderness to calf muscles. CHART REVIEW     Mita Garcia has been experiencing pain and discomfort confirmed as outlined in the pain assessment outlined below.  was reviewed by Margarito Greer MD on 5/3/2021. Pain Assessment  5/3/2021   Location of Pain Foot   Pain Location Comment -   Location Modifiers Left;Right   Severity of Pain 7   Quality of Pain Sharp; Aching   Quality of Pain Comment -   Duration of Pain A few minutes   Frequency of Pain Intermittent   Date Pain First Started -   Aggravating Factors Other (Comment)   Aggravating Factors Comment any time   Limiting Behavior Some   Relieving Factors Rest   Relieving Factors Comment -   Result of Injury No   Work-Related Injury -   Type of Injury -   Type of Injury Comment -        Mita Garcia  has a past medical history of Arthritis, Arthropathy, unspecified, site unspecified, Bruising, Diverticulitis, Diverticulosis, Essential hypertension, Fibromyalgia, GERD (gastroesophageal reflux disease), Hearing loss, Idiopathic sensory axonal neuropathy, 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 She also has no past medical history of Hyperlipidemia or Liver disease.      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 9)     Postoperative respiratory failure ([facility] 9/30/10    Pre-diabetes     11/16 diet controlled    Renal cell cancer ([facility]     Stage T1NxMx    Respiratory failure, post-operative ([facility]     Wears glasses      Past Surgical History:   Procedure Laterality Date    HX BREAST BIOPSY      HX BREAST REDUCTION      HX CATARACT REMOVAL      HX CHOLECYSTECTOMY      HX HERNIA REPAIR      HX HYSTERECTOMY  1984     total vaginal    HX OTHER SURGICAL  9/2010    SIGMOIDECTOMY    HX VEIN STRIPPING       Current Outpatient Medications   Medication Sig    pantoprazole (PROTONIX) 40 mg tablet     diclofenac (VOLTAREN) 1 % gel Apply 4 g to affected area four (4) times daily.  gabapentin (NEURONTIN) 100 mg capsule One to two caps by mouth bid prn nerve pain  Indications: neuropathic pain    calcium carbonate (TUMS) 200 mg calcium (500 mg) chew Take 500 mg by mouth.  dicyclomine (BENTYL) 10 mg capsule     azelastine (ASTELIN) 137 mcg (0.1 %) nasal spray     fluticasone (FLONASE) 50 mcg/actuation nasal spray     metoprolol succinate (TOPROL-XL) 200 mg XL tablet Take 1 Tab by mouth daily.  doxazosin (CARDURA) 4 mg tablet Take 1 Tab by mouth nightly.  acetaminophen (TYLENOL EXTRA STRENGTH) 500 mg tablet Take  by mouth every six (6) hours as needed for Pain.  ACCU-CHEK SOFTCLIX LANCETS [facility]     ACCU-CHEK TAMY PLUS TEST STRP strip     amLODIPine (NORVASC) 10 mg tablet Take  by mouth daily.  Hydrochlorothiazide 12.5 mg tablet Take 25 mg by mouth daily. No current facility-administered medications for this visit.       Allergies   Allergen Reactions    Aspirin Other (comments)    Codeine Other (comments)     Gi distress      Cymbalta [Duloxetine] Other (comments) Chest pain    Darvon [Propoxyphene] Rash and Itching     Gi distress    Dextromethorphan Other (comments)     Stomach cramps    Meperidine Hives     Gi distress    Morphine Other (comments)     Neurological reaction    Other Medication Palpitations     Muscle relaxants    Pcn [Penicillins] Swelling    Propoxyphene N-Acetaminophen Hives    Sulfa (Sulfonamide Antibiotics) Hives     Social History     Occupational History    Not on file   Tobacco Use    Smoking status: Never Smoker    Smokeless tobacco: Never Used   Substance and Sexual Activity    Alcohol use: No    Drug use: No    Sexual activity: Not on file     Family History   Problem Relation Age of Onset    Heart Attack Mother 79    Diabetes Other     Hypertension Other     Arthritis-osteo Other     Heart Disease Other     Heart Attack Sister 71        DIAGNOSTIC LAB DATA      No results found for: HBA1C, HGBE8, OJJ2AOFM, DQV6XYZW // No results found for: GLU, GLUCPOC     No results found for: UVG3ERKC, CZO3TVQK      No results found for: VITD3, XQVID2, XQVID3, XQVID, VD3RIA, [id number]      REVIEW OF SYSTEMS : 5/3/2021  ALL BELOW ARE Negative except : SEE HPI     All other systems reviewed and are negative. 12 point review of systems otherwise negative unless noted in HPI. DIAGNOSTIC IMAGING /ORDERS       FOOT X RAYS 3 VIEWS Right   5/3/2021    NON WEIGHT BEARING    X RAYS AT [facility] [address] [place] [address]  5/3/2021      Bones: No fractures or dislocations. No focal osteolytic or osteoblastic process     Bone Spurs: No significant bone spurs  Foot Alignment: WNL  Joint Condition: No Significant OA  Soft Tissues: Normal, No radiopaque foreign body and No abnormal calcific densities to soft tissues   No ankle joint effusion in lateral projection.   Mineralization: Suggests  Osteopenia    I have personally reviewed the results of the above study and the interpretation of this study is my professional opinion      FOOT X RAYS 3 VIEWS LEFT  5/3/2021    NON WEIGHT BEARING    X RAYS AT [address] [place] [address]  5/3/2021      Bones: No fractures or dislocations. No focal osteolytic or osteoblastic process     Bone Spurs: No significant bone spurs  Foot Alignment: WNL  Joint Condition: No Significant OA  Soft Tissues: Normal, No radiopaque foreign body and No abnormal calcific densities to soft tissues   No ankle joint effusion in lateral projection. Mineralization: Suggests  Osteopenia    I have personally reviewed the results of the above study and the interpretation of this study is my professional opinion                   An electronic signature was used to authenticate this note. Disclaimer: Sections of this note are dictated using utilizing voice recognition software, which may have resulted in some phonetic based errors in grammar and contents. Even though attempts were made to correct all the mistakes, some may have been missed, and remained in the body of the document. If questions arise, please contact our department. Michelle Fofana, as dictated by Leonard Dowd MD  5/3/2021  7:58 AM

## 2021-05-13 ENCOUNTER — TELEPHONE (OUTPATIENT)
Dept: ORTHOPEDIC SURGERY | Age: 81
End: 2021-05-13

## 2021-05-13 NOTE — TELEPHONE ENCOUNTER
Patient called for . Patient said that Harris Nino referred her to the Podiatrist Dr. Bubba Cardona. Patient said she lives in [place] [address]. That Dr. Zac Rosado works out of [employer]. Patient would like to know if she can see a Podiatrist in [place] [address] or if Harris Nino prefers her to see Bryce Cleveland in [facility]. That if he wants her to see Bryce Cleveland she will try to go there. Patient tel. [phone].

## 2021-05-13 NOTE — TELEPHONE ENCOUNTER
Armin to provide referral to Podiatrist that is closer to patients home if available. If not, patient will need to proceed with referral to Dr. Bam Temple. Digna Sweeney [employer] [facility], JOSE ARMANDO, PA-C  5/13/2021   7:41 PM

## 2021-05-17 NOTE — TELEPHONE ENCOUNTER
Spoke to patient and explained that we are not aware of any Podiatrists closer to her home.  She understands and will keep her appointment with Catalino Marin

## 2022-03-18 PROBLEM — G60.8 IDIOPATHIC SENSORIMOTOR AXONAL NEUROPATHY: Status: ACTIVE | Noted: 2018-04-15

## 2022-03-19 PROBLEM — M51.369 DDD (DEGENERATIVE DISC DISEASE), LUMBAR: Status: ACTIVE | Noted: 2018-08-02

## 2022-03-19 PROBLEM — M51.36 DDD (DEGENERATIVE DISC DISEASE), LUMBAR: Status: ACTIVE | Noted: 2018-08-02

## 2022-04-12 ENCOUNTER — OFFICE VISIT (OUTPATIENT)
Dept: ORTHOPEDIC SURGERY | Age: 82
End: 2022-04-12
Payer: MEDICARE

## 2022-04-12 VITALS
HEART RATE: 78 BPM | TEMPERATURE: 97.8 F | OXYGEN SATURATION: 97 % | WEIGHT: 230.8 LBS | HEIGHT: 66 IN | BODY MASS INDEX: 37.09 KG/M2

## 2022-04-12 DIAGNOSIS — G60.8 IDIOPATHIC SENSORIMOTOR AXONAL NEUROPATHY: ICD-10-CM

## 2022-04-12 DIAGNOSIS — M48.062 SPINAL STENOSIS OF LUMBAR REGION WITH NEUROGENIC CLAUDICATION: Primary | ICD-10-CM

## 2022-04-12 PROCEDURE — G8432 DEP SCR NOT DOC, RNG: HCPCS | Performed by: NURSE PRACTITIONER

## 2022-04-12 PROCEDURE — G8536 NO DOC ELDER MAL SCRN: HCPCS | Performed by: NURSE PRACTITIONER

## 2022-04-12 PROCEDURE — 99204 OFFICE O/P NEW MOD 45 MIN: CPT | Performed by: NURSE PRACTITIONER

## 2022-04-12 PROCEDURE — 1101F PT FALLS ASSESS-DOCD LE1/YR: CPT | Performed by: NURSE PRACTITIONER

## 2022-04-12 PROCEDURE — G8427 DOCREV CUR MEDS BY ELIG CLIN: HCPCS | Performed by: NURSE PRACTITIONER

## 2022-04-12 PROCEDURE — G8756 NO BP MEASURE DOC: HCPCS | Performed by: NURSE PRACTITIONER

## 2022-04-12 PROCEDURE — 1090F PRES/ABSN URINE INCON ASSESS: CPT | Performed by: NURSE PRACTITIONER

## 2022-04-12 PROCEDURE — G8417 CALC BMI ABV UP PARAM F/U: HCPCS | Performed by: NURSE PRACTITIONER

## 2022-04-12 PROCEDURE — G8399 PT W/DXA RESULTS DOCUMENT: HCPCS | Performed by: NURSE PRACTITIONER

## 2022-04-12 RX ORDER — TOPIRAMATE 25 MG/1
TABLET ORAL
Qty: 90 TABLET | Refills: 1 | Status: SHIPPED
Start: 2022-04-12 | End: 2022-08-10 | Stop reason: DRUGHIGH

## 2022-04-12 RX ORDER — TRAMADOL HYDROCHLORIDE 50 MG/1
50 TABLET ORAL
COMMUNITY
Start: 2022-04-08

## 2022-04-12 RX ORDER — AZITHROMYCIN 250 MG/1
TABLET, FILM COATED ORAL
COMMUNITY
Start: 2022-01-18 | End: 2022-08-10 | Stop reason: ALTCHOICE

## 2022-04-12 RX ORDER — POTASSIUM CHLORIDE 1500 MG/1
20 TABLET, FILM COATED, EXTENDED RELEASE ORAL DAILY
COMMUNITY
Start: 2022-01-21

## 2022-04-12 NOTE — PROGRESS NOTES
Latanya Chavez presents today for   Chief Complaint   Patient presents with    Back Pain    Hip Pain       Is someone accompanying this pt? no    Is the patient using any DME equipment during OV? no    Depression Screening:  3 most recent PHQ Screens 11/6/2019   Little interest or pleasure in doing things Not at all   Feeling down, depressed, irritable, or hopeless Not at all   Total Score PHQ 2 0       Learning Assessment:  Learning Assessment 8/22/2019   PRIMARY LEARNER Patient   HIGHEST LEVEL OF EDUCATION - PRIMARY LEARNER  -   PRIMARY LANGUAGE ENGLISH   LEARNER PREFERENCE PRIMARY OTHER (COMMENT)   ANSWERED BY patient   RELATIONSHIP SELF       Abuse Screening:  No flowsheet data found. Fall Risk  Fall Risk Assessment, last 12 mths 5/3/2021   Able to walk? Yes   Fall in past 12 months? 0   Do you feel unsteady? 0   Are you worried about falling 0       OPIOID RISK TOOL  No flowsheet data found. Coordination of Care:  1. Have you been to the ER, urgent care clinic since your last visit? no  Hospitalized since your last visit? no    2. Have you seen or consulted any other health care providers outside of the [address] [place] [address] since your last visit? no Include any pap smears or colon screening.  no

## 2022-04-12 NOTE — PROGRESS NOTES
Chief complaint   Chief Complaint   Patient presents with    Back Pain    Hip Pain       History of Present Illness: Wilber Brooks is a  78 y.o.  female 24 who comes in today after last being seen by Dr. Cat Rubio on October 26, 2020. She has known stenosis and idiopathic neuropathy. At that time Dr. Oliva Vick gave her gabapentin 100 mg 1-2 times twice a day but the patient states she never took it because she was afraid of the side effects. 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 She continues to complain of back pain with bilateral buttock pain right greater than left. She states the right can radiate down to her foot but both of them radiated to the anterior thighs. Her pain is increased with walking. She states she gets burning in her feet. She has tried Cymbalta in the past but it gave her chest pain. She does not have a history of glaucoma or kidney stones. She is retired. She is a non-smoker. She denies fever bowel bladder dysfunction. Physical Exam: Patient is a 24-year-old female well-developed well-nourished who is alert and oriented with a normal mood and affect. She has a full weightbearing nonantalgic gait utilizing a single-point cane. She has 4 out of 5 strength bilateral lower extremities. Negative straight leg raise. She has pain with hyperextension lumbar spine. Assessment and Plan: This patient with lumbar stenosis and neuropathy. I will try her on Topamax and ramp her up to 75 mg at nighttime. Also put a referral to physical therapy we will see her back in 2 months. She is planning. We will get a new MRI as her last one was in 2019.       Medications:  Current Outpatient Medications   Medication Sig Dispense Refill    potassium chloride SR (K-TAB) 20 mEq tablet       traMADoL (ULTRAM) 50 mg tablet       topiramate (Topamax) 25 mg tablet 1 tab nightly  x 5 days, then 2 tabs nightly x 5 day and then 3 tabs nightly 90 Tablet 1    pantoprazole (PROTONIX) 40 mg tablet       calcium carbonate (TUMS) 200 mg calcium (500 mg) chew Take 500 mg by mouth.  dicyclomine (BENTYL) 10 mg capsule       metoprolol succinate (TOPROL-XL) 200 mg XL tablet Take 1 Tab by mouth daily. 90 Tab 2    acetaminophen (TYLENOL EXTRA STRENGTH) 500 mg tablet Take  by mouth every six (6) hours as needed for Pain.  ACCU-CHEK SO[place]CLIX LANCETS [facility] [place]       ACCU-CHEK TAMY PLUS TEST STRP strip       amLODIPine (NORVASC) 10 mg tablet Take  by mouth daily.  Hydrochlorothiazide 12.5 mg tablet Take 25 mg by mouth daily.  azithromycin (ZITHROMAX) 250 mg tablet  (Patient not taking: Reported on 4/12/2022)      diclofenac (VOLTAREN) 1 % gel Apply 4 g to affected area four (4) times daily. (Patient not taking: Reported on 4/12/2022) 4 g 0    gabapentin (NEURONTIN) 100 mg capsule One to two caps by mouth bid prn nerve pain  Indications: neuropathic pain (Patient not taking: Reported on 4/12/2022) 120 Cap 2    azelastine (ASTELIN) 137 mcg (0.1 %) nasal spray  (Patient not taking: Reported on 4/12/2022)      fluticasone (FLONASE) 50 mcg/actuation nasal spray  (Patient not taking: Reported on 4/12/2022)      doxazosin (CARDURA) 4 mg tablet Take 1 Tab by mouth nightly. 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 9)     Postoperative respiratory failure ([facility] 9/30/10    Pre-diabetes     11/16 diet controlled    Renal cell cancer ([facility] Stage [id number]    Respiratory failure, post-operative ([address]     Wears glasses      Past Surgical History:   Procedure Laterality Date    HX BREAST BIOPSY      HX BREAST REDUCTION      HX CATARACT REMOVAL      HX CHOLECYSTECTOMY      HX HERNIA REPAIR      HX HYSTERECTOMY  1984     total vaginal    HX OTHER SURGICAL  9/2010    SIGMOIDECTOMY    HX VEIN STRIPPING       Social History     Socioeconomic History    Marital status:      Spouse name: Not on file    Number of children: Not on file    Years of education: Not on file    Highest education level: Not on file   Occupational History    Not on file   Tobacco Use    Smoking status: Never Smoker    Smokeless tobacco: Never Used   Substance and Sexual Activity    Alcohol use: No    Drug use: No    Sexual activity: Not on file   Other Topics Concern    Not on file   Social History Narrative    Not on file     Social Determinants of Health     Financial Resource Strain:     Difficulty of Paying Living Expenses: Not on file   Food Insecurity:     Worried About Running Out of Food in the Last Year: Not on file    Trista of Food in the Last Year: Not on file   Transportation Needs:     Lack of Transportation (Medical): Not on file    Lack of Transportation (Non-Medical):  Not on file   Physical Activity:     Days of Exercise per Week: Not on file    Minutes of Exercise per Session: Not on file   Stress:     Feeling of Stress : Not on file   Social Connections:     Frequency of Communication with Friends and Family: Not on file    Frequency of Social Gatherings with Friends and Family: Not on file    Attends Zoroastrian Services: Not on file    Active Member of Clubs or Organizations: Not on file    Attends Club or Organization Meetings: Not on file    Marital Status: Not on file   Intimate Partner Violence:     Fear of Current or Ex-Partner: Not on file    Emotionally Abused: Not on file    Physically Abused: Not on file   Parmjit Orlando Sexually Abused: Not on file   Housing Stability:     Unable to Pay for Housing in the Last Year: Not on file    Number of Places Lived in the Last Year: Not on file    Unstable Housing in the Last Year: Not on file     Family History   Problem Relation Age of Onset    Heart Attack Mother 79    Diabetes Other     Hypertension Other     OSTEOARTHRITIS Other     Heart Disease Other     Heart Attack Sister 71       Physical Exam:  Visit Vitals  Pulse 78   Temp 97.8 °F (36.6 °C) (Temporal)   Ht 5' 6\" (1.676 m)   Wt 230 lb 12.8 oz (104.7 kg)   SpO2 97%   BMI 37.25 kg/m²     Pain Scale: 6/10       has been . reviewed and is appropriate          Diagnoses and all orders for this visit:    1. Spinal stenosis of lumbar region with neurogenic claudication  -     topiramate (Topamax) 25 mg tablet; 1 tab nightly  x 5 days, then 2 tabs nightly x 5 day and then 3 tabs nightly  -     REFERRAL TO PHYSICAL THERAPY    2. Idiopathic sensorimotor axonal neuropathy  -     topiramate (Topamax) 25 mg tablet; 1 tab nightly  x 5 days, then 2 tabs nightly x 5 day and then 3 tabs nightly  -     REFERRAL TO PHYSICAL THERAPY            Follow-up and Dispositions    · Return in about 2 months (around 6/12/2022) for tania Mccormack.              We have informed Gaudencio Saavedra to notify us for immediate appointment if she has any worsening neurogical symptoms or if an emergency situation presents, then call [phone]

## 2022-04-29 NOTE — PROGRESS NOTES
AMBULATORY PROGRESS NOTE      Patient: Catalina Kitchen             MRN: [id number]     SSN: [id number] Body mass index is 37.25 kg/m². YOB: 1940     AGE: 78 y.o. EX: female    PCP: Evie Sanchez MD       IMPRESSION //  DIAGNOSIS AND TREATMENT PLAN        Catalina Kitchen has a diagnosis of: So overall, she is some knee OA medial compartment knee OA greater than patellofemoral and lateral compartment OA. But she is tender in the pes anserinus bursa of her left knee. Will be conservative and try the anti-inflammatory gel Voltaren several times a day. She also has a left FHL tendinitis, will be conservative try the anti-inflammatory cream, and also get her new diabetic shoes with a diabetic insert. She had gone to [facility] in the past, for this, as such we will have her go there for this. We will call her once we get the brace for her left knee. Anticipate seeing her again in about 5 weeks time      DIAGNOSES    1. Pes planovalgus    2. Posterior tibial tendinitis of right lower extremity    3. Sinus tarsi syndrome of right ankle    4. Pes anserinus bursitis of left knee        Orders Placed This Encounter    Generic Supply Order     Bilateral AS/AC braces    REFERRAL TO PODIATRY     Referral Priority:   Routine     Referral Type:   Consultation     Referral Reason:   Specialty Services Required     Referred to Provider:   Rica Weinberg DPM     Number of Visits Requested:   1    diclofenac (VOLTAREN) 1 % gel     Sig: Apply 4 g to affected area four (4) times daily. Dispense:  100 g     Refill:  4            PLAN:    1. I will prescribe her Voltraren gel for her RT foot pain. 2. I will refer her to Dr. Philomena Peterson for toenail plate care. 3. I will provide and place her in new AS/AC braces. 4. I will obtain a 2-view XR of her RT foot in the office today  5.  I will provide and place her in a knee brace for her LT knee    RTO 6 weeks    There are no Patient Instructions on file for this visit. Please follow up with your PCP for any health maintenance as recommended         Wilber Brooks  expresses understanding of the diagnosis, treatment plan, and all of their proposed questions were answered to their satisfaction. Patient education has been provided re the diagnoses. HPI //  Tiffany Barnett IS A 78 y.o. female who is a/an  established patient, presenting to my outpatient office for evaluation of  the following chief complaint(s):     Chief Complaint   Patient presents with    Foot Pain     bilateral       I have seen her in the past for occasional left plantar medial arch foot pain along with right anterolateral pain when wearing certain shoes. Pt stated her pain was improved when standing with her legs rotated laterally. Of note, Pt states she was unable to cut her toenails and wanted to be referred to a podiatrist. I ordered 3-view XRs of each of her feet, referred her to [facility] for toenail plate care, provided an order for bilateral medially-posted diabetic inserts (Rt side more medially-posted than the left), and prescribed her Voltaren gel as she is unable to take PO anti-inflammatories due to gastric issues. Since LOV Pt states her RT foot is still worse than her left foot. The AS/AC braces prescribed in the past do help with her pain. Pt states she no longer has pain behind her RT foot, more so on the bottom. Pt also states she has not tried any anti-inflammatory creams for her foot pain and she is unable to see Augustina Schroeder for toenail plate care because the practice no longer accepts her insurance. Of note, Pt is also experiencing pain in her left knee. She reports having pain, along the medial portion of her left knee, the very specific region. She points to the pes anserinus, the left knee. No history of trauma.   This medial knee pain, is present, when she ambulates, no night pain    Visit Vitals  Ht 5' 6\" (1.676 m)   BMI 37.25 kg/m²       Appearance: Alert, well appearing and pleasant patient who is in no distress, oriented to person, place/time, and who follows commands. Normal dress/motor activity/thought processes/memory. This patient is accompanied in the examination room by her  self. Patient arrives to office via: with assistive device: AS/AC brace and diabetic shoes with foamed inserts. Psychiatric:  Normal Affect/mood. Judgement, behavior, and conduct are appropriate. Speech normal in context and clarity, memory intact grossly, no involuntary movements - tremors. H EENT (2): Head normocephalic & atraumatic. Eye: pupils are round// EOM are intact // Neck: ROM WNL  // Hearings Intact   Respiratory: Breathing non labored     ANKLE/FOOT bilateral     Gait: uses assistive device   Tenderness: mild RT Sinus Tarsi and over the FHL on the plantar part of the midfoot going towards the hindfoot. Cutaneous: Skin mole over the bottom of the RT foot. Joint Motion: FROM in the LT foot  Joint / Tendon Stability:  No Ankle or Subtalar instability or joint laxity. No peroneal sublux ability or dislocation  Alignment: neutral Hindfoot,    Neuro Motor/Sensory: NL/NL  Vascular: NL foot/ankle pulses,   Lymphatics: No extremity lymphedema, No calf swelling, no tenderness to calf muscles. LT Knee: Tenderness over the pes bursa. No effusion, medial lateral joint lines remain nontender. No significant patellofemoral crepitus. No gross instability of the knee. Skin is intact. CHART REVIEW     Violet Fonseca has been experiencing pain and discomfort confirmed as outlined in the pain assessment outlined below.  was reviewed by Edward Milner MD on 5/2/2022.      Pain Assessment  4/12/2022   Location of Pain Back   Pain Location Comment -   Location Modifiers -   Severity of Pain 6   Quality of Pain Dull   Quality of Pain Comment -   Duration of Pain Persistent Frequency of Pain Constant   Date Pain First Started -   Aggravating Factors Other (Comment)   Aggravating Factors Comment sitting   Limiting Behavior Some   Relieving Factors Exercises   Relieving Factors Comment -   Result of Injury No   Work-Related Injury -   Type of Injury -   Type of Injury Comment -        Francheska Dickens  has a past medical history of Arthritis, Arthropathy, unspecified, site unspecified, Bruising, Diverticulitis, Diverticulosis, Essential hypertension, Fibromyalgia, GERD (gastroesophageal reflux disease), Hearing loss, Idiopathic sensory axonal neuropathy, 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 She has no past medical history of Hyperlipidemia or Liver disease. 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 She has no past medical history of Hyperlipidemia or Liver disease. has a past surgical history that includes hx hysterectomy (1984); hx cataract removal; hx vein stripping; hx cholecystectomy; hx hernia repair; hx breast reduction; hx breast biopsy; and hx other surgical (9/2010). family history includes Diabetes in an other family member; Heart Attack (age of onset: 79) in her mother; Heart Attack (age of onset: 71) in her sister; Heart Disease in an other family member; Hypertension in an other family member; OSTEOARTHRITIS in an other family member.    Current Outpatient Medications   Medication Sig    diclofenac (VOLTAREN) 1 % gel Apply 4 g to affected area four (4) times daily.  azithromycin (ZITHROMAX) 250 mg tablet  (Patient not taking: Reported on 4/12/2022)    potassium chloride SR (K-TAB) 20 mEq tablet     traMADoL (ULTRAM) 50 mg tablet     topiramate (Topamax) 25 mg tablet 1 tab nightly  x 5 days, then 2 tabs nightly x 5 day and then 3 tabs nightly    pantoprazole (PROTONIX) 40 mg tablet     diclofenac (VOLTAREN) 1 % gel Apply 4 g to affected area four (4) times daily. (Patient not taking: Reported on 4/12/2022)    gabapentin (NEURONTIN) 100 mg capsule One to two caps by mouth bid prn nerve pain  Indications: neuropathic pain (Patient not taking: Reported on 4/12/2022)    calcium carbonate (TUMS) 200 mg calcium (500 mg) chew Take 500 mg by mouth.  dicyclomine (BENTYL) 10 mg capsule     azelastine (ASTELIN) 137 mcg (0.1 %) nasal spray  (Patient not taking: Reported on 4/12/2022)    fluticasone (FLONASE) 50 mcg/actuation nasal spray  (Patient not taking: Reported on 4/12/2022)    metoprolol succinate (TOPROL-XL) 200 mg XL tablet Take 1 Tab by mouth daily.  doxazosin (CARDURA) 4 mg tablet Take 1 Tab by mouth nightly. (Patient not taking: Reported on 4/12/2022)    acetaminophen (TYLENOL EXTRA STRENGTH) 500 mg tablet Take  by mouth every six (6) hours as needed for Pain.  ACCU-CHEK SOFTCLIX LANCETS [facility] [place]     ACCU-CHEK TAMY PLUS TEST STRP strip     amLODIPine (NORVASC) 10 mg tablet Take  by mouth daily.  Hydrochlorothiazide 12.5 mg tablet Take 25 mg by mouth daily. No current facility-administered medications for this visit.      Allergies   Allergen Reactions    Aspirin Other (comments)    Codeine Other (comments)     Gi distress      Cymbalta [Duloxetine] Other (comments)     Chest pain    Darvon [Propoxyphene] Rash and Itching     Gi distress    Dextromethorphan Other (comments)     Stomach cramps    Meperidine Hives     Gi distress    Morphine Other (comments)     Neurological reaction    Other Medication Palpitations     Muscle relaxants    Pcn [Penicillins] Swelling    Propoxyphene N-Acetaminophen Hives    Sulfa (Sulfonamide Antibiotics) Hives     Social History     Occupational History    Not on file   Tobacco Use    Smoking status: Never Smoker    Smokeless tobacco: Never Used   Substance and Sexual Activity    Alcohol use: No    Drug use: No    Sexual activity: Not on file       reports that she has never smoked. She has never used smokeless tobacco. She reports that she does not drink alcohol and does not use drugs. DIAGNOSTIC LAB DATA      No results found for: HBA1C, PJX7PCTB, ELJ8RNXH // No results found for: GLU, GLUCPOC     No results found for: QXQ8ZLXR, HTG4NCGW      No results found for: VITD3, XQVID2, XQVID3, XQVID, VD3RIA, AWPT07BZCOF     Drug Screen Most Recent Result Date    No resulted procedures found. REVIEW OF SYSTEMS : 5/2/2022  ALL BELOW ARE Negative except : SEE HPI     All other systems reviewed and are negative. 12 point review of systems otherwise negative unless noted in HPI. RADIOGRAPHS// IMAGING//DIAGNOSTIC DATA     Orders Placed This Encounter    Generic Supply Order    REFERRAL TO PODIATRY    diclofenac (VOLTAREN) 1 % gel        No notes on file     X RAYS AT [address] [place] [address]  5/2/2022    LEFT KNEE    NON WEIGHT BEARING    Bones: No fractures, subluxations, or dislocations  Alignment: Normal (WNL)  Joint: Tricompartmental///medial is more affected lateral, but the patellofemoral joint, is also affected and the fact that there is joint space narrowing of peripheral fights. There is medial joint space narrowing as well  Soft Tissues: Normal  OA: Medial, and patellofemoral osteoarthritis is seen preferentially more so than the lateral compartment.   But there is tricompartmental knee OA left knee  Mineralization: suggests Osteopenia    I have personally reviewed the results of the above study. The interpretation of this study is my professional opinion. I have spent 20 minutes reviewing the previous notes, reviewing diagnostic studies [Advanced  Imaging, Diagnostic test results (x-rays)] and had a direct face to face with the patient discussing the diagnosis and importance of compliance with the treatment and plan. There is  discussion for the potential for surgery, answering all questions, as well as documenting patient care coordination for this individual on the day of the visit. Disclaimer:     Sections of this note are dictated using utilizing voice recognition software, which may have resulted in some phonetic based errors in grammar and contents. Even though attempts were made to correct all the mistakes, some may have been missed, and remained in the body of the document. If questions arise, please contact our department. An electronic signature was used to authenticate this note. Karl Lim may have a reminder for a \"due or due soon\" health maintenance. I have asked that she contact her primary care provider for follow-up on this health maintenance. There are no Patient Instructions on file for this visit. Please follow up with your PCP for any health maintenance as recommended.                 Jami Funez, as dictated by Kaylen New MD.  5/2/2022  10:15 AM

## 2022-05-02 ENCOUNTER — OFFICE VISIT (OUTPATIENT)
Dept: ORTHOPEDIC SURGERY | Age: 82
End: 2022-05-02
Payer: MEDICARE

## 2022-05-02 VITALS — HEIGHT: 66 IN | BODY MASS INDEX: 37.25 KG/M2

## 2022-05-02 DIAGNOSIS — M25.562 CHRONIC PAIN OF LEFT KNEE: ICD-10-CM

## 2022-05-02 DIAGNOSIS — M70.52 PES ANSERINUS BURSITIS OF LEFT KNEE: ICD-10-CM

## 2022-05-02 DIAGNOSIS — Q66.6 PES PLANOVALGUS: Primary | ICD-10-CM

## 2022-05-02 DIAGNOSIS — G89.29 CHRONIC PAIN OF LEFT KNEE: ICD-10-CM

## 2022-05-02 DIAGNOSIS — L60.0 IGTN (INGROWING TOE NAIL): ICD-10-CM

## 2022-05-02 DIAGNOSIS — M76.821 POSTERIOR TIBIAL TENDINITIS OF RIGHT LOWER EXTREMITY: ICD-10-CM

## 2022-05-02 DIAGNOSIS — M77.50 TENDONITIS OF FOOT: ICD-10-CM

## 2022-05-02 DIAGNOSIS — E11.42 TYPE 2 DIABETES MELLITUS WITH DIABETIC POLYNEUROPATHY, WITHOUT LONG-TERM CURRENT USE OF INSULIN (HCC): ICD-10-CM

## 2022-05-02 DIAGNOSIS — E10.10 DIABETIC ACETONEMIA (HCC): ICD-10-CM

## 2022-05-02 DIAGNOSIS — M25.571 SINUS TARSI SYNDROME OF RIGHT ANKLE: ICD-10-CM

## 2022-05-02 PROCEDURE — 73560 X-RAY EXAM OF KNEE 1 OR 2: CPT | Performed by: ORTHOPAEDIC SURGERY

## 2022-05-02 PROCEDURE — 1101F PT FALLS ASSESS-DOCD LE1/YR: CPT | Performed by: ORTHOPAEDIC SURGERY

## 2022-05-02 PROCEDURE — G8756 NO BP MEASURE DOC: HCPCS | Performed by: ORTHOPAEDIC SURGERY

## 2022-05-02 PROCEDURE — G8432 DEP SCR NOT DOC, RNG: HCPCS | Performed by: ORTHOPAEDIC SURGERY

## 2022-05-02 PROCEDURE — 99214 OFFICE O/P EST MOD 30 MIN: CPT | Performed by: ORTHOPAEDIC SURGERY

## 2022-05-02 PROCEDURE — G8417 CALC BMI ABV UP PARAM F/U: HCPCS | Performed by: ORTHOPAEDIC SURGERY

## 2022-05-02 PROCEDURE — G8399 PT W/DXA RESULTS DOCUMENT: HCPCS | Performed by: ORTHOPAEDIC SURGERY

## 2022-05-02 PROCEDURE — G8427 DOCREV CUR MEDS BY ELIG CLIN: HCPCS | Performed by: ORTHOPAEDIC SURGERY

## 2022-05-02 PROCEDURE — G8536 NO DOC ELDER MAL SCRN: HCPCS | Performed by: ORTHOPAEDIC SURGERY

## 2022-05-02 PROCEDURE — 1090F PRES/ABSN URINE INCON ASSESS: CPT | Performed by: ORTHOPAEDIC SURGERY

## 2022-05-02 RX ORDER — DICLOFENAC SODIUM 10 MG/G
4 GEL TOPICAL 4 TIMES DAILY
Qty: 100 G | Refills: 4 | Status: SHIPPED
Start: 2022-05-02 | End: 2022-08-13

## 2022-05-02 RX ORDER — LEG BRACE
EACH MISCELLANEOUS
Qty: 1 EACH | Refills: 0 | Status: SHIPPED
Start: 2022-05-02 | End: 2022-05-02 | Stop reason: CLARIF

## 2022-06-01 ENCOUNTER — OFFICE VISIT (OUTPATIENT)
Dept: ORTHOPEDIC SURGERY | Age: 82
End: 2022-06-01
Payer: MEDICARE

## 2022-06-01 VITALS
WEIGHT: 230 LBS | BODY MASS INDEX: 36.96 KG/M2 | HEART RATE: 68 BPM | HEIGHT: 66 IN | TEMPERATURE: 97.5 F | OXYGEN SATURATION: 98 %

## 2022-06-01 DIAGNOSIS — M48.062 SPINAL STENOSIS OF LUMBAR REGION WITH NEUROGENIC CLAUDICATION: Primary | ICD-10-CM

## 2022-06-01 PROCEDURE — 1123F ACP DISCUSS/DSCN MKR DOCD: CPT | Performed by: NURSE PRACTITIONER

## 2022-06-01 PROCEDURE — 1101F PT FALLS ASSESS-DOCD LE1/YR: CPT | Performed by: NURSE PRACTITIONER

## 2022-06-01 PROCEDURE — G8427 DOCREV CUR MEDS BY ELIG CLIN: HCPCS | Performed by: NURSE PRACTITIONER

## 2022-06-01 PROCEDURE — G8536 NO DOC ELDER MAL SCRN: HCPCS | Performed by: NURSE PRACTITIONER

## 2022-06-01 PROCEDURE — 1090F PRES/ABSN URINE INCON ASSESS: CPT | Performed by: NURSE PRACTITIONER

## 2022-06-01 PROCEDURE — G8399 PT W/DXA RESULTS DOCUMENT: HCPCS | Performed by: NURSE PRACTITIONER

## 2022-06-01 PROCEDURE — G8756 NO BP MEASURE DOC: HCPCS | Performed by: NURSE PRACTITIONER

## 2022-06-01 PROCEDURE — 99212 OFFICE O/P EST SF 10 MIN: CPT | Performed by: NURSE PRACTITIONER

## 2022-06-01 PROCEDURE — G8417 CALC BMI ABV UP PARAM F/U: HCPCS | Performed by: NURSE PRACTITIONER

## 2022-06-01 PROCEDURE — G8432 DEP SCR NOT DOC, RNG: HCPCS | Performed by: NURSE PRACTITIONER

## 2022-06-01 NOTE — PROGRESS NOTES
Yolanda Priyanka presents today for   Chief Complaint   Patient presents with    Back Pain       Is someone accompanying this pt? no    Is the patient using any DME equipment during OV? no    Depression Screening:  3 most recent PHQ Screens 11/6/2019   Little interest or pleasure in doing things Not at all   Feeling down, depressed, irritable, or hopeless Not at all   Total Score PHQ 2 0       Learning Assessment:  Learning Assessment 8/22/2019   PRIMARY LEARNER Patient   HIGHEST LEVEL OF EDUCATION - PRIMARY LEARNER  -   PRIMARY LANGUAGE ENGLISH   LEARNER PREFERENCE PRIMARY OTHER (COMMENT)   ANSWERED BY patient   RELATIONSHIP SELF       Abuse Screening:  No flowsheet data found. Fall Risk  Fall Risk Assessment, last 12 mths 5/3/2021   Able to walk? Yes   Fall in past 12 months? 0   Do you feel unsteady? 0   Are you worried about falling 0       OPIOID RISK TOOL  No flowsheet data found. Coordination of Care:  1. Have you been to the ER, urgent care clinic since your last visit? no  Hospitalized since your last visit? no    2. Have you seen or consulted any other health care providers outside of the [address] [place] [address] since your last visit? no Include any pap smears or colon screening.  no

## 2022-06-01 NOTE — PROGRESS NOTES
Chief complaint   Chief Complaint   Patient presents with    Back Pain       History of Present Illness: Amalia Araujo is a  78 y.o.  female  who comes in today of back pain with bilateral buttock pain right greater than left. She states the right can radiate down to her foot but both of them radiated to the anterior thighs. Her pain is increased with walking. She states she gets burning in her feet. She states that the Topamax 75 mg that we put her on last visit did help with her leg pain but not her back or buttock pain. She is reporting she is having some cognitive slowness since taking the Topamax. She has tried and failed Cymbalta in the past.  She was afraid to take gabapentin. Last visit we also ordered physical therapy but she has not started that yet. She is scheduled to start next week. She is a non-smoker. She denies fever bowel bladder dysfunction. Physical Exam: Patient is a 41-year-old female well-developed well-nourished who is alert and oriented with a normal mood and affect. She has a full weightbearing nonantalgic gait utilizing a single-point cane. She has 4 out of 5 strength bilateral lower extremities. Negative straight leg raise. She has pain with hyperextension lumbar spine. Assessment and Plan: This patient with lumbar stenosis and neuropathy. She will wean off the Topamax since she is having side effects from it. I did give her a weaning schedule. Offered to try her on Lyrica but she did not want to try that at this moment. She states she would rather try the physical therapy first.  She will finish out her physical therapy and we will see her back afterwards. If she is not improving we will get a new MRI. I will see her back in 2 months sooner if needed.       Medications:  Current Outpatient Medications   Medication Sig Dispense Refill    azithromycin (ZITHROMAX) 250 mg tablet       potassium chloride SR (K-TAB) 20 mEq tablet       traMADoL (ULTRAM) 50 mg tablet       topiramate (Topamax) 25 mg tablet 1 tab nightly  x 5 days, then 2 tabs nightly x 5 day and then 3 tabs nightly 90 Tablet 1    calcium carbonate (TUMS) 200 mg calcium (500 mg) chew Take 500 mg by mouth.  dicyclomine (BENTYL) 10 mg capsule       metoprolol succinate (TOPROL-XL) 200 mg XL tablet Take 1 Tab by mouth daily. 90 Tab 2    acetaminophen (TYLENOL EXTRA STRENGTH) 500 mg tablet Take  by mouth every six (6) hours as needed for Pain.  ACCU-CHEK SOFTCLIX LANCETS [facility]       ACCU-CHEK TAMY PLUS TEST STRP strip       amLODIPine (NORVASC) 10 mg tablet Take  by mouth daily.  Hydrochlorothiazide 12.5 mg tablet Take 25 mg by mouth daily.  diclofenac (VOLTAREN) 1 % gel Apply 4 g to affected area four (4) times daily. (Patient not taking: Reported on 6/1/2022) 100 g 4    pantoprazole (PROTONIX) 40 mg tablet  (Patient not taking: Reported on 6/1/2022)      diclofenac (VOLTAREN) 1 % gel Apply 4 g to affected area four (4) times daily. (Patient not taking: Reported on 4/12/2022) 4 g 0    gabapentin (NEURONTIN) 100 mg capsule One to two caps by mouth bid prn nerve pain  Indications: neuropathic pain (Patient not taking: Reported on 4/12/2022) 120 Cap 2    azelastine (ASTELIN) 137 mcg (0.1 %) nasal spray  (Patient not taking: Reported on 4/12/2022)      fluticasone (FLONASE) 50 mcg/actuation nasal spray  (Patient not taking: Reported on 4/12/2022)      doxazosin (CARDURA) 4 mg tablet Take 1 Tab by mouth nightly. 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 9)     Postoperative respiratory failure ([facility] [address] 9/30/10    Pre-diabetes     11/16 diet controlled    Renal cell cancer (HCC)     Stage [id number]    Respiratory failure, post-operative ([facility] [address]     Wears glasses      Past Surgical History:   Procedure Laterality Date    HX BREAST BIOPSY      HX BREAST REDUCTION      HX CATARACT REMOVAL      HX CHOLECYSTECTOMY      HX HERNIA REPAIR      HX HYSTERECTOMY  1984     total vaginal    HX OTHER SURGICAL  9/2010    SIGMOIDECTOMY    HX VEIN STRIPPING       Social History     Socioeconomic History    Marital status:      Spouse name: Not on file    Number of children: Not on file    Years of education: Not on file    Highest education level: Not on file   Occupational History    Not on file   Tobacco Use    Smoking status: Never Smoker    Smokeless tobacco: Never Used   Substance and Sexual Activity    Alcohol use: No    Drug use: No    Sexual activity: Not on file   Other Topics Concern    Not on file   Social History Narrative    Not on file     Social Determinants of Health     Financial Resource Strain:     Difficulty of Paying Living Expenses: Not on file   Food Insecurity:     Worried About Running Out of Food in the Last Year: Not on file    Trista of Food in the Last Year: Not on file   Transportation Needs:     Lack of Transportation (Medical): Not on file    Lack of Transportation (Non-Medical):  Not on file   Physical Activity:     Days of Exercise per Week: Not on file    Minutes of Exercise per Session: Not on file   Stress:     Feeling of Stress : Not on file   Social Connections:     Frequency of Communication with Friends and Family: Not on file    Frequency of Social Gatherings with Friends and Family: Not on file    Attends Orthodoxy Services: Not on file    Active Member of Clubs or Organizations: Not on file    Attends Club or Organization Meetings: Not on file    Marital Status: Not on file   Intimate Partner Violence:     Fear of Current or Ex-Partner: Not on file    Emotionally Abused: Not on file    Physically Abused: Not on file    Sexually Abused: Not on file   Housing Stability:     Unable to Pay for Housing in the Last Year: Not on file    Number of [place] in the Last Year: Not on file    Unstable Housing in the Last Year: Not on file     Family History   Problem Relation Age of Onset    Heart Attack Mother 79    Diabetes Other     Hypertension Other     OSTEOARTHRITIS Other     Heart Disease Other     Heart Attack Sister 71       Physical Exam:  Visit Vitals  Pulse 68   Temp 97.5 °F (36.4 °C) (Temporal)   Ht 5' 6\" (1.676 m)   Wt 230 lb (104.3 kg)   SpO2 98%   BMI 37.12 kg/m²     Pain Scale: 4/10       has been . reviewed and is appropriate          Diagnoses and all orders for this visit:    1. Spinal stenosis of lumbar region with neurogenic claudication            Follow-up and Dispositions    · Return in about 2 years (around 6/1/2024) for With nurse practitioner Fer Lujan. We have informed Aidan Ascencio to notify us for immediate appointment if she has any worsening neurogical symptoms or if an emergency situation presents, then call 911  Please note that this dictation was completed with Mitchell Older, the computer voice recognition software. Quite often unanticipated grammatical, syntax, homophones, and other interpretive errors are inadvertently transcribed by the computer software. Please disregard these errors. Please excuse any errors that have escaped final proofreading.

## 2022-06-08 ENCOUNTER — DOCUMENTATION ONLY (OUTPATIENT)
Dept: ORTHOPEDIC SURGERY | Age: 82
End: 2022-06-08

## 2022-06-09 ENCOUNTER — TELEPHONE (OUTPATIENT)
Dept: ORTHOPEDIC SURGERY | Age: 82
End: 2022-06-09

## 2022-06-09 DIAGNOSIS — M48.062 SPINAL STENOSIS OF LUMBAR REGION WITH NEUROGENIC CLAUDICATION: ICD-10-CM

## 2022-06-09 DIAGNOSIS — M54.41 CHRONIC BILATERAL LOW BACK PAIN WITH BILATERAL SCIATICA: Primary | ICD-10-CM

## 2022-06-09 DIAGNOSIS — M54.42 CHRONIC BILATERAL LOW BACK PAIN WITH BILATERAL SCIATICA: Primary | ICD-10-CM

## 2022-06-09 DIAGNOSIS — G89.29 CHRONIC BILATERAL LOW BACK PAIN WITH BILATERAL SCIATICA: Primary | ICD-10-CM

## 2022-06-09 NOTE — TELEPHONE ENCOUNTER
Patient is asking if we can create an order for in home physical therapy versus outpatient because she cannot drive herself and has to pay someone to drive her and then a copay on top of that once she gets there. Please advise.      Patient [phone]

## 2022-06-29 ENCOUNTER — TELEPHONE (OUTPATIENT)
Dept: ORTHOPEDIC SURGERY | Age: 82
End: 2022-06-29

## 2022-06-29 NOTE — TELEPHONE ENCOUNTER
Patient called and stated her leg is in extreme pain and the veins are swollen after her therapy session today. She has another session tomorrow morning but she can barely walk so she wants to know if she should attend tomorrow's session or not. Please advise. Patient can be reached at [phone] or if you can't reach her contact her daughter Ms. Monica Neville at  [phone].

## 2022-06-29 NOTE — TELEPHONE ENCOUNTER
If her veins are swollen then she needs to see her PCP. Have her call PT and explain her pain to them to see if they want her to continue or adjust her therapy.

## 2022-06-29 NOTE — TELEPHONE ENCOUNTER
Called patient [phone] and verified her name and date of birth. I informed her of the message below and also added if she can not go to her PCP to find the nearest urgent care or ER to be evaluated. Patient verbalized understanding and no further action needed at this time.

## 2022-07-01 NOTE — TELEPHONE ENCOUNTER
Patient called again and said that she went to see her PCP and was told that she has Phlebitis in the legs. That she mainly has it in the left leg. Patient would like to know if NP Jeffry Campoverde wants her to continue physical therapy or if she wants her to wait until she sees a Specialist that will help her with the Phlebitis in the legs. Patient tel. [phone].

## 2022-07-07 NOTE — TELEPHONE ENCOUNTER
Called patient [phone] and left voice mail asking patient to call our office back. I was calling to inform her per the provider that she can put physical therapy on hold until her phlebitis clears up since it is causing her pain. No further action is needed at this time.

## 2022-08-10 ENCOUNTER — OFFICE VISIT (OUTPATIENT)
Dept: ORTHOPEDIC SURGERY | Age: 82
End: 2022-08-10
Payer: MEDICARE

## 2022-08-10 VITALS
WEIGHT: 230 LBS | HEIGHT: 66 IN | HEART RATE: 67 BPM | TEMPERATURE: 96.8 F | BODY MASS INDEX: 36.96 KG/M2 | OXYGEN SATURATION: 99 %

## 2022-08-10 DIAGNOSIS — M48.062 SPINAL STENOSIS OF LUMBAR REGION WITH NEUROGENIC CLAUDICATION: Primary | ICD-10-CM

## 2022-08-10 PROCEDURE — G8399 PT W/DXA RESULTS DOCUMENT: HCPCS | Performed by: PHYSICAL MEDICINE & REHABILITATION

## 2022-08-10 PROCEDURE — G8432 DEP SCR NOT DOC, RNG: HCPCS | Performed by: PHYSICAL MEDICINE & REHABILITATION

## 2022-08-10 PROCEDURE — 1101F PT FALLS ASSESS-DOCD LE1/YR: CPT | Performed by: PHYSICAL MEDICINE & REHABILITATION

## 2022-08-10 PROCEDURE — 1123F ACP DISCUSS/DSCN MKR DOCD: CPT | Performed by: PHYSICAL MEDICINE & REHABILITATION

## 2022-08-10 PROCEDURE — 1090F PRES/ABSN URINE INCON ASSESS: CPT | Performed by: PHYSICAL MEDICINE & REHABILITATION

## 2022-08-10 PROCEDURE — G8756 NO BP MEASURE DOC: HCPCS | Performed by: PHYSICAL MEDICINE & REHABILITATION

## 2022-08-10 PROCEDURE — G8427 DOCREV CUR MEDS BY ELIG CLIN: HCPCS | Performed by: PHYSICAL MEDICINE & REHABILITATION

## 2022-08-10 PROCEDURE — G8536 NO DOC ELDER MAL SCRN: HCPCS | Performed by: PHYSICAL MEDICINE & REHABILITATION

## 2022-08-10 PROCEDURE — G8417 CALC BMI ABV UP PARAM F/U: HCPCS | Performed by: PHYSICAL MEDICINE & REHABILITATION

## 2022-08-10 PROCEDURE — 99213 OFFICE O/P EST LOW 20 MIN: CPT | Performed by: PHYSICAL MEDICINE & REHABILITATION

## 2022-08-10 RX ORDER — OLOPATADINE HYDROCHLORIDE 665 UG/1
2 SPRAY NASAL DAILY
COMMUNITY
Start: 2022-07-29 | End: 2022-09-06

## 2022-08-10 RX ORDER — TOPIRAMATE 50 MG/1
50 TABLET, FILM COATED ORAL
Qty: 30 TABLET | Refills: 2 | Status: SHIPPED
Start: 2022-08-10 | End: 2022-11-03 | Stop reason: SINTOL

## 2022-08-10 RX ORDER — BLOOD-GLUCOSE METER
EACH MISCELLANEOUS
COMMUNITY
Start: 2022-06-30

## 2022-08-10 NOTE — PROGRESS NOTES
[facility] [address]  [address], [address]  [facility], [address]  Phone: [phone]  Fax: [phone]        Sid Campos  : 1940  PCP: Otf Sotomayor DO    PROGRESS NOTE      ASSESSMENT AND PLAN    Diagnoses and all orders for this visit:    1. Spinal stenosis of lumbar region with neurogenic claudication  -     topiramate (TOPAMAX) 50 mg tablet; Take 1 Tablet by mouth nightly. King Stauffer is a 78 y.o. female with symptomatic lumbar stenosis. She has had to stop physical therapy due to phlebitis. Topamax 75 was effective but caused her to have cognitive issues. We will going to reduce this down to 50 mg.. Restart Topamax  Given instructions on lumbar exercises. Perform as tolerated. Advised to continue HEP as tolerated. Follow-up and Dispositions    Return in about 6 weeks (around 2022) for medication management. HISTORY OF PRESENT ILLNESS      King Stauffer is a 78 y.o. female presents for follow up of back pain. LV with NP [place] 2022 weaned Topamax, patient declined Lyrica and Gabapentin, referred to PT. She continues to have low back pain radiating into her buttocks and thighs. R > L. Her sciatic pain is exacerbated with walking. Pt states she is able to stand long enough to wash the dishes. Pt describes a burning sensation BLE. Pt states Topamax helped relieved her pain, though it caused cognitive slowness. Pt had to stop physical therapy, promoted phlebitis. Pain Assessment  8/10/2022   Location of Pain Back;Leg   Pain Location Comment -   Location Modifiers -   Severity of Pain 7   Quality of Pain Aching   Quality of Pain Comment -   Duration of Pain Persistent   Frequency of Pain Constant   Date Pain First Started -   Aggravating Factors Walking; Other (Comment)   Aggravating Factors Comment lying down   Limiting Behavior Yes   Relieving Factors Rest;Nothing   Relieving Factors Comment -   Result of Injury No   Work-Related Injury -   Type of Injury -   Type of Injury Comment -     Investigations:  Last L MRI 2019: Mild stenosis L3-4    Treatments patient has tried:  Physical therapy:Yes 7/2022 for low back - caused phlebitis, low back 2017  Beneficial medications: Tylenol, Topamax   Failed medications: Cymbalta - chest pain. Unable to take MDP. Gabapentin-fear of side effects,   Spinal injections: No     Spinal surgery: No.     Hx of renal cell carcinoma, neuropathy, pre-DM. Pt reports she has a fallen arch and uses a brace to prevent it from turning excessively. This is a pt of Dr. Nellie Corona, has a Dx of fibromyalgia and chronic ankle pain. She also consults with Dr. Sandy Norman. Pt has a -aid come out 3 times per week.      PHYSICAL EXAMINATION    Visit Vitals  Pulse 67   Temp 96.8 °F (36 °C) (Temporal)   Ht 5' 6\" (1.676 m)   Wt 230 lb (104.3 kg)   SpO2 99% Comment: RA   BMI 37.12 kg/m²     Ambulates with single point cane  LE strength intact  SLR negative                Written by Shell Melara, as dictated by Arin Pierre MD.

## 2022-08-10 NOTE — PATIENT INSTRUCTIONS
Spondylolysis and Spondylolisthesis: Exercises  Introduction  Here are some examples of exercises for you to try. The exercises may be suggested for a condition or for rehabilitation. Start each exercise slowly. Ease off the exercises if you start to have pain. You will be told when to start these exercises and which ones will work best for you. How to do the exercises  Single knee-to-chest    Lie on your back with your knees bent and your feet flat on the floor. You can put a small pillow under your head and neck if it is more comfortable. Bring one knee to your chest, keeping the other foot flat on the floor. Keep your lower back pressed to the floor. Hold for 15 to 30 seconds. Relax, and lower the knee to the starting position. Repeat with the other leg. Repeat 2 to 4 times with each leg. To get more stretch, put your other leg flat on the floor while pulling your knee to your chest.  Double knee-to-chest    Lie on your back with your knees bent and your feet flat on the floor. You can put a small pillow under your head and neck if it is more comfortable. Bring both knees to your chest.  Keep your lower back pressed to the floor. Hold for 15 to 30 seconds. Relax, and lower your knees to the starting position. Repeat 2 to 4 times. Alternate arm and leg (bird dog) exercise    Do this exercise slowly. Try to keep your body straight at all times. Start on the floor, on your hands and knees. Tighten your belly muscles by pulling your belly button in toward your spine. Be sure you continue to breathe normally and do not hold your breath. Raise one arm off the floor, and hold it straight out in front of you. Be careful not to let your shoulder drop down, because that will twist your trunk. Hold for about 6 seconds, then lower your arm and switch to your other arm. Repeat 8 to 12 times on each arm. When you can do this exercise with ease and no pain, repeat steps 1 through 5.  But this time do it with one leg raised off the floor, holding your leg straight out behind you. Be careful not to let your hip drop down, because that will twist your trunk. When holding your leg straight out becomes easier, try raising your opposite arm at the same time, and repeat steps 1 through 5. Bridging    Lie on your back with both knees bent. Your knees should be bent about 90 degrees. Then push your feet into the floor, squeeze your buttocks, and lift your hips off the floor until your shoulders, hips, and knees are all in a straight line. Hold for about 6 seconds as you continue to breathe normally, and then slowly lower your hips back down to the floor and rest for up to 10 seconds. Repeat 8 to 12 times. Curl-ups    Lie on the floor on your back with your knees bent at a 90-degree angle. Your feet should be flat on the floor, about 12 inches from your buttocks. Cross your arms over your chest. If this bothers your neck, try putting your hands behind your neck (not your head), with your elbows spread apart. Slowly tighten your belly muscles and raise your shoulder blades off the floor. Keep your head in line with your body, and do not press your chin to your chest.  Hold this position for 1 or 2 seconds, then slowly lower yourself back down to the floor. Repeat 8 to 12 times. Plank    Do this exercise slowly. Try to keep your body straight at all times, and do not let one hip drop lower than the other. Lie on your stomach, resting your upper body on your forearms. Tighten your belly muscles by pulling your belly button in toward your spine. Keeping your knees on the floor, press down with your forearms to lift your upper body off the floor. Hold for about 6 seconds, then lower your body to the floor. Rest for up to 10 seconds. Repeat 8 to 12 times. Over time, work up to holding for 15 to 30 seconds each time.   If this exercise is easy to do with your knees on the floor, try doing this exercise with your knees and legs straight, supported by your toes on the floor. Follow-up care is a key part of your treatment and safety. Be sure to make and go to all appointments, and call your doctor if you are having problems. It's also a good idea to know your test results and keep a list of the medicines you take. Where can you learn more? Go to http://www.bernstein.com/  Enter M245 in the search box to learn more about \"Spondylolysis and Spondylolisthesis: Exercises. \"  Current as of: July 1, 2021               Content Version: 13.2  © 2006-2022 [employer]. Care instructions adapted under license by [employer] (which disclaims liability or warranty for this information). If you have questions about a medical condition or this instruction, always ask your healthcare professional. [address] any warranty or liability for your use of this information.     g

## 2022-08-10 NOTE — PROGRESS NOTES
Allison Jordan presents today for   Chief Complaint   Patient presents with    Back Pain     Lower      Leg Pain     Bilateral        Is someone accompanying this pt? no    Is the patient using any DME equipment during OV? Yes, cane    Depression Screening:  3 most recent PHQ Screens 11/6/2019   Little interest or pleasure in doing things Not at all   Feeling down, depressed, irritable, or hopeless Not at all   Total Score PHQ 2 0       Learning Assessment:  Learning Assessment 8/22/2019   PRIMARY LEARNER Patient   HIGHEST LEVEL OF EDUCATION - PRIMARY LEARNER  -   PRIMARY LANGUAGE ENGLISH   LEARNER PREFERENCE PRIMARY OTHER (COMMENT)   ANSWERED BY patient   RELATIONSHIP SELF         Fall Risk  Fall Risk Assessment, last 12 mths 5/3/2021   Able to walk? Yes   Fall in past 12 months? 0   Do you feel unsteady? 0   Are you worried about falling 0       Coordination of Care:  1. Have you been to the ER, urgent care clinic since your last visit? no  Hospitalized since your last visit? no    2. Have you seen or consulted any other health care providers outside of the [address] [place] [address] since your last visit? Yes, vascular, physical therapy, neurology Include any pap smears or colon screening.  no

## 2022-08-10 NOTE — LETTER
8/13/2022    Patient: Lucas Borden   YOB: 1940   Date of Visit: 8/10/2022     Tracy Arcos, [address] [facility] [address]  Via Fax: [phone]    Dear Tracy Arcos DO,      Thank you for referring Ms. Praveen Weir to [facility] for evaluation. My notes for this consultation are attached. If you have questions, please do not hesitate to call me. I look forward to following your patient along with you.       Sincerely,    Waynard Claude, MD

## 2022-09-21 ENCOUNTER — OFFICE VISIT (OUTPATIENT)
Dept: ORTHOPEDIC SURGERY | Age: 82
End: 2022-09-21
Payer: MEDICARE

## 2022-09-21 VITALS
HEART RATE: 67 BPM | HEIGHT: 66 IN | DIASTOLIC BLOOD PRESSURE: 62 MMHG | SYSTOLIC BLOOD PRESSURE: 136 MMHG | WEIGHT: 228 LBS | OXYGEN SATURATION: 98 % | BODY MASS INDEX: 36.64 KG/M2 | TEMPERATURE: 97.9 F

## 2022-09-21 DIAGNOSIS — R20.2 NUMBNESS AND TINGLING OF BOTH FEET: ICD-10-CM

## 2022-09-21 DIAGNOSIS — M48.062 SPINAL STENOSIS OF LUMBAR REGION WITH NEUROGENIC CLAUDICATION: Primary | ICD-10-CM

## 2022-09-21 DIAGNOSIS — R20.0 NUMBNESS AND TINGLING OF BOTH FEET: ICD-10-CM

## 2022-09-21 DIAGNOSIS — M76.821 POSTERIOR TIBIAL TENDINITIS OF RIGHT LOWER EXTREMITY: ICD-10-CM

## 2022-09-21 PROCEDURE — G8417 CALC BMI ABV UP PARAM F/U: HCPCS | Performed by: PHYSICAL MEDICINE & REHABILITATION

## 2022-09-21 PROCEDURE — 1090F PRES/ABSN URINE INCON ASSESS: CPT | Performed by: PHYSICAL MEDICINE & REHABILITATION

## 2022-09-21 PROCEDURE — G8427 DOCREV CUR MEDS BY ELIG CLIN: HCPCS | Performed by: PHYSICAL MEDICINE & REHABILITATION

## 2022-09-21 PROCEDURE — G8536 NO DOC ELDER MAL SCRN: HCPCS | Performed by: PHYSICAL MEDICINE & REHABILITATION

## 2022-09-21 PROCEDURE — G8399 PT W/DXA RESULTS DOCUMENT: HCPCS | Performed by: PHYSICAL MEDICINE & REHABILITATION

## 2022-09-21 PROCEDURE — 1123F ACP DISCUSS/DSCN MKR DOCD: CPT | Performed by: PHYSICAL MEDICINE & REHABILITATION

## 2022-09-21 PROCEDURE — 99214 OFFICE O/P EST MOD 30 MIN: CPT | Performed by: PHYSICAL MEDICINE & REHABILITATION

## 2022-09-21 PROCEDURE — G8754 DIAS BP LESS 90: HCPCS | Performed by: PHYSICAL MEDICINE & REHABILITATION

## 2022-09-21 PROCEDURE — G8752 SYS BP LESS 140: HCPCS | Performed by: PHYSICAL MEDICINE & REHABILITATION

## 2022-09-21 PROCEDURE — 1101F PT FALLS ASSESS-DOCD LE1/YR: CPT | Performed by: PHYSICAL MEDICINE & REHABILITATION

## 2022-09-21 PROCEDURE — G8432 DEP SCR NOT DOC, RNG: HCPCS | Performed by: PHYSICAL MEDICINE & REHABILITATION

## 2022-09-21 RX ORDER — BECLOMETHASONE DIPROPIONATE 80 UG/1
AEROSOL, METERED NASAL
COMMUNITY
Start: 2022-09-10

## 2022-09-21 NOTE — PATIENT INSTRUCTIONS
Low Back Pain: Exercises  Introduction  Here are some examples of exercises for you to try. The exercises may be suggested for a condition or for rehabilitation. Start each exercise slowly. Ease off the exercises if you start to have pain. You will be told when to start these exercises and which ones will work best for you. How to do the exercises  Press-up    Lie on your stomach, supporting your body with your forearms. Press your elbows down into the floor to raise your upper back. As you do this, relax your stomach muscles and allow your back to arch without using your back muscles. As your press up, do not let your hips or pelvis come off the floor. Hold for 15 to 30 seconds, then relax. Repeat 2 to 4 times. Alternate arm and leg (bird dog) exercise    Do this exercise slowly. Try to keep your body straight at all times, and do not let one hip drop lower than the other. Start on the floor, on your hands and knees. Tighten your belly muscles. Raise one leg off the floor, and hold it straight out behind you. Be careful not to let your hip drop down, because that will twist your trunk. Hold for about 6 seconds, then lower your leg and switch to the other leg. Repeat 8 to 12 times on each leg. Over time, work up to holding for 10 to 30 seconds each time. If you feel stable and secure with your leg raised, try raising the opposite arm straight out in front of you at the same time. Knee-to-chest exercise    Lie on your back with your knees bent and your feet flat on the floor. Bring one knee to your chest, keeping the other foot flat on the floor (or keeping the other leg straight, whichever feels better on your lower back). Keep your lower back pressed to the floor. Hold for at least 15 to 30 seconds. Relax, and lower the knee to the starting position. Repeat with the other leg. Repeat 2 to 4 times with each leg.   To get more stretch, put your other leg flat on the floor while pulling your knee to your chest.  Curl-ups    Lie on the floor on your back with your knees bent at a 90-degree angle. Your feet should be flat on the floor, about 12 inches from your buttocks. Cross your arms over your chest. If this bothers your neck, try putting your hands behind your neck (not your head), with your elbows spread apart. Slowly tighten your belly muscles and raise your shoulder blades off the floor. Keep your head in line with your body, and do not press your chin to your chest.  Hold this position for 1 or 2 seconds, then slowly lower yourself back down to the floor. Repeat 8 to 12 times. Pelvic tilt exercise    Lie on your back with your knees bent. \"Brace\" your stomach. This means to tighten your muscles by pulling in and imagining your belly button moving toward your spine. You should feel like your back is pressing to the floor and your hips and pelvis are rocking back. Hold for about 6 seconds while you breathe smoothly. Repeat 8 to 12 times. Heel dig bridging    Lie on your back with both knees bent and your ankles bent so that only your heels are digging into the floor. Your knees should be bent about 90 degrees. Then push your heels into the floor, squeeze your buttocks, and lift your hips off the floor until your shoulders, hips, and knees are all in a straight line. Hold for about 6 seconds as you continue to breathe normally, and then slowly lower your hips back down to the floor and rest for up to 10 seconds. Do 8 to 12 repetitions. Hamstring stretch in doorway    Lie on your back in a doorway, with one leg through the open door. Slide your leg up the wall to straighten your knee. You should feel a gentle stretch down the back of your leg. Hold the stretch for at least 15 to 30 seconds. Do not arch your back, point your toes, or bend either knee. Keep one heel touching the floor and the other heel touching the wall. Repeat with your other leg. Do 2 to 4 times for each leg.   Hip flexor stretch    Kneel on the floor with one knee bent and one leg behind you. Place your forward knee over your foot. Keep your other knee touching the floor. Slowly push your hips forward until you feel a stretch in the upper thigh of your rear leg. Hold the stretch for at least 15 to 30 seconds. Repeat with your other leg. Do 2 to 4 times on each side. Wall sit    Stand with your back 10 to 12 inches away from a wall. Lean into the wall until your back is flat against it. Slowly slide down until your knees are slightly bent, pressing your lower back into the wall. Hold for about 6 seconds, then slide back up the wall. Repeat 8 to 12 times. Follow-up care is a key part of your treatment and safety. Be sure to make and go to all appointments, and call your doctor if you are having problems. It's also a good idea to know your test results and keep a list of the medicines you take. Where can you learn more? Go to http://www.gray.com/  Enter R361 in the search box to learn more about \"Low Back Pain: Exercises. \"  Current as of: July 1, 2021               Content Version: 13.2  © 2006-2022 [employer]. Care instructions adapted under license by [employer] (which disclaims liability or warranty for this information). If you have questions about a medical condition or this instruction, always ask your healthcare professional. [address] any warranty or liability for your use of this information.

## 2022-09-21 NOTE — LETTER
9/21/2022    Patient: Cristi David   YOB: 1940   Date of Visit: 9/21/2022     Chriss Montejo, [address] [facility] [address]  Via Fax: [phone]    Dear Chriss Montejo, DO,      Thank you for referring Ms. Ashley Oliver to [facility] for evaluation. My notes for this consultation are attached. If you have questions, please do not hesitate to call me. I look forward to following your patient along with you.       Sincerely,    Manoj Hartley MD

## 2022-09-21 NOTE — PROGRESS NOTES
Carol Cottrell presents today for   Chief Complaint   Patient presents with    Back Pain     Lower      Hip Pain     right    Leg Pain     right       Is someone accompanying this pt? no    Is the patient using any DME equipment during OV? no    Depression Screening:  3 most recent PHQ Screens 11/6/2019   Little interest or pleasure in doing things Not at all   Feeling down, depressed, irritable, or hopeless Not at all   Total Score PHQ 2 0       Learning Assessment:  Learning Assessment 8/22/2019   PRIMARY LEARNER Patient   HIGHEST LEVEL OF EDUCATION - PRIMARY LEARNER  -   PRIMARY LANGUAGE ENGLISH   LEARNER PREFERENCE PRIMARY OTHER (COMMENT)   ANSWERED BY patient   RELATIONSHIP SELF       Abuse Screening:  Abuse Screening Questionnaire 9/21/2022   Do you ever feel afraid of your partner? N   Are you in a relationship with someone who physically or mentally threatens you? N   Is it safe for you to go home? Y       Fall Risk  Fall Risk Assessment, last 12 mths 9/21/2022   Able to walk? Yes   Fall in past 12 months? 0   Do you feel unsteady? 0   Are you worried about falling 0         Coordination of Care:  1. Have you been to the ER, urgent care clinic since your last visit? Yes, pt states that she went to the ER at [facility] for her pain. Hospitalized since your last visit? no    2. Have you seen or consulted any other health care providers outside of the [address] [place] [address] since your last visit? no Include any pap smears or colon screening.  no

## 2022-09-21 NOTE — PROGRESS NOTES
[address], [address]  [place], [facility] [address]  Phone: [phone]  Fax: [phone]        Isra Cox Monett  : 1940  PCP: Pipe De Anda,     PROGRESS NOTE      ASSESSMENT AND PLAN    Diagnoses and all orders for this visit:    1. Spinal stenosis of lumbar region with neurogenic claudication    2. Posterior tibial tendinitis of right lower extremity    3. Type 2 diabetes mellitus with diabetic polyneuropathy, without long-term current use of insulin ([facility] [address]      Shell Markham is a 78 y.o. female with lumbar stenosis, recent flare of full body fibromyalgia. Gradually resume topiramate which was helping with her symptoms. Given instructions on hamstring exercises. Perform as tolerated. Patient will start taking Topamax 25 mg QHS x 1 week then increase to 50 mg QHS. Discussed using Voltaren Gel as needed  Continue PRN Tylenol  Follow-up vascular for her phlebitis. Follow-up and Dispositions    Return in about 6 weeks (around 2022) for medication management. HISTORY OF PRESENT ILLNESS      Shell Markham is a 78 y.o. female presents for follow up of back pain. LV restart Topamax, decreased 50 mg BID. Patient was seen in the ED 2022 for generalized pain, diagnosed with a flare of FM. She was given Tramadol. Pt reports low back pain radiating into her anterolateral thighs. R > L. Her pain is exacerbated with prolonged sitting and standing. She continues to have a burning sensation BLE. She tells me she is due to have a nerve test next month. Pt reports some benefit with her Topamax. She stopped taking her Topamax after her ED visit. Pt initially believed it was the cause of her flare. Dose has been reduced at the August 10 visit. Her ED visit was a month later. She has seen vascular, they are recommending several procedures. Uncertain of specific diagnosis. Reports she did not have a DVT.   Was diagnosed with phlebitis. Pain Assessment  9/21/2022   Location of Pain Back;Hip;Leg   Pain Location Comment -   Location Modifiers Right   Severity of Pain 5   Quality of Pain Burning;Dull   Quality of Pain Comment -   Duration of Pain Persistent   Frequency of Pain Constant   Date Pain First Started -   Aggravating Factors Other (Comment)   Aggravating Factors Comment lying down, sitting too long   Limiting Behavior -   Relieving Factors Other (Comment)   Relieving Factors Comment getting up and moving around   Result of Injury -   Work-Related Injury -   Type of Injury -   Type of Injury Comment -            Investigations:  Last L MRI 2019: Mild stenosis L3-4     Treatments patient has tried:  Physical therapy:Yes 7/2022 for low back - \"caused phlebitis\" discontinued, low back 2017  Beneficial medications: Tylenol, Topamax   Failed medications: Cymbalta - chest pain. Unable to take MDP. Gabapentin-fear of side effects,   Spinal injections: No     Spinal surgery: No.     Hx of renal cell carcinoma, neuropathy, pre-DM. Pt reports she has a fallen arch and uses a brace to prevent it from turning excessively. This is a pt of Dr. Nathan Cortés, has a Dx of fibromyalgia and chronic ankle pain. She also consults with Dr. Chandni Darby. Pt has a -aid come out 3 times per week.      PHYSICAL EXAMINATION    Visit Vitals  /62 (BP 1 Location: Right upper arm, BP Patient Position: Sitting, BP Cuff Size: Large adult)   Pulse 67   Temp 97.9 °F (36.6 °C) (Oral)   Ht 5' 6\" (1.676 m)   Wt 228 lb (103.4 kg)   SpO2 98% Comment: RA   BMI 36.80 kg/m²       TTP B/L L4-5, right SIJ  Ambulates with single point cane  LE strength 4/5  B/L Pedal Edema   No hyperreflexia              Written by Sarah Orantes, as dictated by Laurell Closs, MD.

## 2022-11-03 ENCOUNTER — OFFICE VISIT (OUTPATIENT)
Dept: ORTHOPEDIC SURGERY | Age: 82
End: 2022-11-03
Payer: MEDICARE

## 2022-11-03 VITALS
DIASTOLIC BLOOD PRESSURE: 68 MMHG | TEMPERATURE: 97.8 F | HEIGHT: 66 IN | OXYGEN SATURATION: 98 % | SYSTOLIC BLOOD PRESSURE: 130 MMHG | WEIGHT: 225.4 LBS | HEART RATE: 79 BPM | RESPIRATION RATE: 18 BRPM | BODY MASS INDEX: 36.22 KG/M2

## 2022-11-03 DIAGNOSIS — M54.31 RIGHT SIDED SCIATICA: ICD-10-CM

## 2022-11-03 DIAGNOSIS — M48.062 SPINAL STENOSIS OF LUMBAR REGION WITH NEUROGENIC CLAUDICATION: Primary | ICD-10-CM

## 2022-11-03 PROCEDURE — 3078F DIAST BP <80 MM HG: CPT | Performed by: PHYSICAL MEDICINE & REHABILITATION

## 2022-11-03 PROCEDURE — 99214 OFFICE O/P EST MOD 30 MIN: CPT | Performed by: PHYSICAL MEDICINE & REHABILITATION

## 2022-11-03 PROCEDURE — G8427 DOCREV CUR MEDS BY ELIG CLIN: HCPCS | Performed by: PHYSICAL MEDICINE & REHABILITATION

## 2022-11-03 PROCEDURE — 1090F PRES/ABSN URINE INCON ASSESS: CPT | Performed by: PHYSICAL MEDICINE & REHABILITATION

## 2022-11-03 PROCEDURE — G8417 CALC BMI ABV UP PARAM F/U: HCPCS | Performed by: PHYSICAL MEDICINE & REHABILITATION

## 2022-11-03 PROCEDURE — G8399 PT W/DXA RESULTS DOCUMENT: HCPCS | Performed by: PHYSICAL MEDICINE & REHABILITATION

## 2022-11-03 PROCEDURE — G8432 DEP SCR NOT DOC, RNG: HCPCS | Performed by: PHYSICAL MEDICINE & REHABILITATION

## 2022-11-03 PROCEDURE — 1101F PT FALLS ASSESS-DOCD LE1/YR: CPT | Performed by: PHYSICAL MEDICINE & REHABILITATION

## 2022-11-03 PROCEDURE — G8754 DIAS BP LESS 90: HCPCS | Performed by: PHYSICAL MEDICINE & REHABILITATION

## 2022-11-03 PROCEDURE — G8536 NO DOC ELDER MAL SCRN: HCPCS | Performed by: PHYSICAL MEDICINE & REHABILITATION

## 2022-11-03 PROCEDURE — G8752 SYS BP LESS 140: HCPCS | Performed by: PHYSICAL MEDICINE & REHABILITATION

## 2022-11-03 PROCEDURE — 1123F ACP DISCUSS/DSCN MKR DOCD: CPT | Performed by: PHYSICAL MEDICINE & REHABILITATION

## 2022-11-03 PROCEDURE — 3074F SYST BP LT 130 MM HG: CPT | Performed by: PHYSICAL MEDICINE & REHABILITATION

## 2022-11-03 RX ORDER — HYDROCHLOROTHIAZIDE 25 MG/1
TABLET ORAL
COMMUNITY
Start: 2022-10-13

## 2022-11-03 RX ORDER — LIDOCAINE 50 MG/G
PATCH TOPICAL
COMMUNITY
Start: 2022-10-25

## 2022-11-03 RX ORDER — PREDNISONE 20 MG/1
TABLET ORAL
COMMUNITY
Start: 2022-10-27

## 2022-11-03 NOTE — PROGRESS NOTES
Ilsa Hinton presents today for   Chief Complaint   Patient presents with    Back Pain       Is someone accompanying this pt? no    Is the patient using any DME equipment during OV? Yes cane    Depression Screening:  3 most recent PHQ Screens 11/6/2019   Little interest or pleasure in doing things Not at all   Feeling down, depressed, irritable, or hopeless Not at all   Total Score PHQ 2 0       Learning Assessment:  Learning Assessment 8/22/2019   PRIMARY LEARNER Patient   HIGHEST LEVEL OF EDUCATION - PRIMARY LEARNER  -   PRIMARY LANGUAGE ENGLISH   LEARNER PREFERENCE PRIMARY OTHER (COMMENT)   ANSWERED BY patient   RELATIONSHIP SELF       Abuse Screening:  Abuse Screening Questionnaire 9/21/2022   Do you ever feel afraid of your partner? N   Are you in a relationship with someone who physically or mentally threatens you? N   Is it safe for you to go home? Y       Fall Risk  Fall Risk Assessment, last 12 mths 11/3/2022   Able to walk? Yes   Fall in past 12 months? 0   Do you feel unsteady? 1   Are you worried about falling 0       OPIOID RISK TOOL  No flowsheet data found. Coordination of Care:  1. Have you been to the ER, urgent care clinic since your last visit? Yes 2 weeks ago back pain  Hospitalized since your last visit? no    2. Have you seen or consulted any other health care providers outside of the [address] [place] [address] since your last visit? no Include any pap smears or colon screening.  no

## 2022-11-03 NOTE — PROGRESS NOTES
[address]  [address], [address]  [place], [facility] [address]  Phone: [phone]  Fax: [phone]        Eldon Naylor  : 1940  PCP: Clifton Duong, DO    PROGRESS NOTE      ASSESSMENT AND PLAN    Diagnoses and all orders for this visit:    1. Spinal stenosis of lumbar region with neurogenic claudication  -     MRI LUMB SPINE WO CONT; Future    2. Right sided sciatica  -     MRI LUMB SPINE WO CONT; Future      Lety Bowman is a 78 y.o. female w/LSS and sciatica. Failed PT, various medications. Pt interested in injections, needs new MRI. DC Topamax due to confusion  Trial of Voltaren Gel, pt has at home. Discussed cardiac risk, low compared to oral agents. L MRI for increasing low back pain, R > L sciatica, failed PT/meds, possible injections  Advised to continue HEP as tolerated. Follow-up and Dispositions    Return in about 4 weeks (around 2022) for MRI/CT fu. HISTORY OF PRESENT ILLNESS      Lety Bowman is a 78 y.o. female presents for follow up of back pain. LV restarted Topamax 50 mg QHS. Pt seen at the ED for right hip pain and sciatica, given Lidoderm patches and Percocet # 18. Hip CT benign. She reports increasing low back pain radiating into her lateral thighs and calves. R > L. Her pain is increased with standing and walking. Pt was unable to tolerate Topamax due to cognitive issues. She states Lidoderm patches are ineffective. She states Percocet did not help. Pt was started on  Prednisone, which helped. Denies side effects. She is compliant with her HEP. She has had steroid joint injections w/benefit, denies side effects.     Pain Assessment  11/3/2022   Location of Pain Back   Pain Location Comment -   Location Modifiers -   Severity of Pain 4   Quality of Pain Dull;Aching   Quality of Pain Comment -   Duration of Pain Persistent   Frequency of Pain Constant   Date Pain First Started -   Aggravating Factors Bending;Standing;Walking   Aggravating Factors Comment -   Limiting Behavior Yes   Relieving Factors Rest;Ice   Relieving Factors Comment -   Result of Injury No   Work-Related Injury -   Type of Injury -   Type of Injury Comment -         Investigations:  Right hip XR 10/2022: mild OA  RLE CT 10/2022: benign   Last L MRI 2019: Mild stenosis L3-4     Treatments patient has tried:  Physical therapy:Yes 7/2022 for low back - \"caused phlebitis\" discontinued, low back 2017  Beneficial medications: Tylenol, MDP  Failed medications: Cymbalta - chest pain. Unable to take MDP. Gabapentin-fear of side effects, Topamax - cognitive issues, Percocet - ineffective  Spinal injections: No     Spinal surgery: No.     Hx of renal cell carcinoma, neuropathy, pre-DM. Pt reports she has a fallen arch and uses a brace to prevent it from turning excessively. This is a pt of Dr. Erik Lopez, has a Dx of fibromyalgia and chronic ankle pain. She also consults with Dr. Jasson Up. Pt has a -aid come out 3 times per week.      PHYSICAL EXAMINATION    Visit Vitals  /68 (BP 1 Location: Right arm, BP Patient Position: Sitting, BP Cuff Size: Adult)   Pulse 79   Temp 97.8 °F (36.6 °C) (Temporal)   Resp 18   Ht 5' 6\" (1.676 m)   Wt 225 lb 6.4 oz (102.2 kg)   SpO2 98% Comment: RA   BMI 36.38 kg/m²       TTP right sciatic notch  LE strength intact  SLR negative  No edema              Written by Lizz Woodard, as dictated by Tamera Schultz MD.

## 2022-11-03 NOTE — LETTER
11/3/2022    Patient: Theresa Wren   YOB: 1940   Date of Visit: 11/3/2022     Imelda Weiss, [address] [facility] [address]  Via Fax: [phone]    Dear Imelda Weiss DO,      Thank you for referring Ms. Anand Do to [facility] for evaluation. My notes for this consultation are attached. If you have questions, please do not hesitate to call me. I look forward to following your patient along with you.       Sincerely,    Pretty Sanabria MD

## 2022-11-10 DIAGNOSIS — M54.31 RIGHT SIDED SCIATICA: ICD-10-CM

## 2022-11-10 DIAGNOSIS — M48.062 SPINAL STENOSIS OF LUMBAR REGION WITH NEUROGENIC CLAUDICATION: ICD-10-CM

## 2022-11-14 ENCOUNTER — OFFICE VISIT (OUTPATIENT)
Dept: ORTHOPEDIC SURGERY | Age: 82
End: 2022-11-14
Payer: MEDICARE

## 2022-11-14 VITALS — BODY MASS INDEX: 36.15 KG/M2 | WEIGHT: 224 LBS

## 2022-11-14 DIAGNOSIS — M76.61 ACHILLES TENDINITIS OF RIGHT LOWER EXTREMITY: Primary | ICD-10-CM

## 2022-11-14 DIAGNOSIS — M76.821 POSTERIOR TIBIAL TENDINITIS OF RIGHT LOWER EXTREMITY: ICD-10-CM

## 2022-11-14 PROCEDURE — 99213 OFFICE O/P EST LOW 20 MIN: CPT | Performed by: ORTHOPAEDIC SURGERY

## 2022-11-14 PROCEDURE — G8432 DEP SCR NOT DOC, RNG: HCPCS | Performed by: ORTHOPAEDIC SURGERY

## 2022-11-14 PROCEDURE — 1090F PRES/ABSN URINE INCON ASSESS: CPT | Performed by: ORTHOPAEDIC SURGERY

## 2022-11-14 PROCEDURE — 1123F ACP DISCUSS/DSCN MKR DOCD: CPT | Performed by: ORTHOPAEDIC SURGERY

## 2022-11-14 PROCEDURE — G8399 PT W/DXA RESULTS DOCUMENT: HCPCS | Performed by: ORTHOPAEDIC SURGERY

## 2022-11-14 PROCEDURE — G8417 CALC BMI ABV UP PARAM F/U: HCPCS | Performed by: ORTHOPAEDIC SURGERY

## 2022-11-14 PROCEDURE — G8427 DOCREV CUR MEDS BY ELIG CLIN: HCPCS | Performed by: ORTHOPAEDIC SURGERY

## 2022-11-14 PROCEDURE — G8536 NO DOC ELDER MAL SCRN: HCPCS | Performed by: ORTHOPAEDIC SURGERY

## 2022-11-14 PROCEDURE — 1101F PT FALLS ASSESS-DOCD LE1/YR: CPT | Performed by: ORTHOPAEDIC SURGERY

## 2022-11-14 PROCEDURE — G8756 NO BP MEASURE DOC: HCPCS | Performed by: ORTHOPAEDIC SURGERY

## 2022-11-14 NOTE — PROGRESS NOTES
AMBULATORY PROGRESS NOTE      Patient: Tiara Trinidad             MRN: [id number]     SSN: [id number] Body mass index is 36.15 kg/m². YOB: 1940     AGE: 78 y.o. EX: female    PCP: Reji Brown DO       IMPRESSION //  DIAGNOSIS AND TREATMENT PLAN      Tiara Trinidad has a diagnosis of:      DIAGNOSES    1. Achilles tendinitis of right lower extremity    2. Posterior tibial tendinitis of right lower extremity        Orders Placed This Encounter    Generic Supply Order     Neoprene figure-of-eight ankle compression, ankle supportive brace    [facility] [phone]    REFERRAL TO PHYSICAL THERAPY          PLAN:    1. Formal PT, for Achilles, right Achilles tendinitis  2. RTO 1/23/2023    There are no Patient Instructions on file for this visit. Follow-up and Dispositions    Return in about 10 weeks (around 1/23/2023). Please follow up with your PCP for any health maintenance as recommended         Tiara Trinidad  expresses understanding of the diagnosis, treatment plan, and all of their proposed questions were answered to their satisfaction. Patient education has been provided re the diagnoses. HPI //  Elizabeth Barros IS A 78 y.o. female who is a/an  established patient, presenting to my outpatient office for evaluation of  the following chief complaint(s):     Chief Complaint   Patient presents with    Ankle Pain     right       At LOV, Tiara Trinidad presented with right posterior tibial tendinitis and sinus tarsi syndrome and left pes anserinus bursitis. I obtained right foot 2V XR in the office. I referred her to Dr. Kassandra Leal for toenail plate care. I prescribed Voltaren gel. Bilateral AS/AC braces and a left knee brace were given and placed on the patient.      Since Martha Miller, she has been seen by Dr. Dickson Judge, and was diagnosed with phlebitis, he recommended knee-high and thigh-high compression stockings that she use initially but states is hard to use and she has more pain when she takes it off. She has seen Dr. Nette Campos, for lower extremity neuropathy, but she is not taking any medications, currently, at this time. She is in formal physical therapy, currently, for right-sided sciatica, and has been under the care of Dr. Lucina Shaw, for 2 months, and a physical therapy has helped her quite a bit for her right lower extremity sciatica. Visit Vitals  Wt 224 lb (101.6 kg)   BMI 36.15 kg/m²       Appearance: Alert, well appearing and pleasant patient who is in no distress, oriented to person, place/time, and who follows commands. Normal dress/motor activity/thought processes/memory. This patient is accompanied in the examination room by her  self. Patient arrives to office via: with assistive device: cane. Footwear: Athletic shoe    Psychiatric:  Normal Affect/mood. Judgement, behavior, and conduct are appropriate. Speech normal in context and clarity, memory intact grossly, no involuntary movements - tremors. H EENT (2): Head normocephalic & atraumatic. Eye: pupils are round// EOM are intact // Neck: ROM WNL  // Hearings Intact   Respiratory: Breathing non labored     ANKLE/FOOT right    Gait: slow  Tenderness: mild    Achilles tendon  Cutaneous: Small increase venules to her supramalleolar region of her right ankle are present  Joint Motion:   WNL   Joint / Tendon Stability:  No Ankle or Subtalar instability or joint laxity. No peroneal sublux ability or dislocation  Alignment: neutral Hindfoot,    Neuro Motor/Sensory: NL/NL  Vascular: NL foot/ankle pulses,   Lymphatics: No extremity lymphedema, No calf swelling, no tenderness to calf muscles. CHART REVIEW     Jodi Jameson has been experiencing pain and discomfort confirmed as outlined in the pain assessment outlined below.  was reviewed by Francheska Cortes MD on 11/14/2022.      Pain Assessment  11/14/2022   Location of Pain Ankle   Pain Location Comment -   Location Modifiers Right   Severity of Pain 4   Quality of Pain Dull;Aching   Quality of Pain Comment -   Duration of Pain -   Frequency of Pain Intermittent   Date Pain First Started -   Aggravating Factors Walking;Standing   Aggravating Factors Comment -   Limiting Behavior -   Relieving Factors Rest   Relieving Factors Comment -   Result of Injury No   Work-Related Injury -   Type of Injury -   Type of Injury Comment -        Brody López  has a past medical history of Arthritis, Arthropathy, unspecified, site unspecified, Bruising, Diverticulitis, Diverticulosis, Essential hypertension, Fibromyalgia, GERD (gastroesophageal reflux disease), Hearing loss, Idiopathic sensory axonal neuropathy, 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 She has no past medical history of Hyperlipidemia or Liver disease. 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 She has no past medical history of Hyperlipidemia or Liver disease. has a past surgical history that includes hx hysterectomy (1984); hx cataract removal; hx vein stripping; hx cholecystectomy; hx hernia repair; hx breast reduction; hx breast biopsy; and hx other surgical (9/2010). family history includes Diabetes in an other family member; Heart Attack (age of onset: 79) in her mother; Heart Attack (age of onset: 71) in her sister;  Heart Disease in an other family member; Hypertension in an other family member; OSTEOARTHRITIS in an other family member. Current Outpatient Medications   Medication Sig    lidocaine (LIDODERM) 5 %     predniSONE (DELTASONE) 20 mg tablet     hydroCHLOROthiazide (HYDRODIURIL) 25 mg tablet     True Metrix Air Glucose Meter monitoring kit     potassium chloride SR (K-TAB) 20 mEq tablet Take 20 mEq by mouth daily. traMADoL (ULTRAM) 50 mg tablet Take 50 mg by mouth daily as needed. pantoprazole (PROTONIX) 40 mg tablet Take 40 mg by mouth two (2) times a day. calcium carbonate (TUMS) 200 mg calcium (500 mg) chew Take 500 mg by mouth. dicyclomine (BENTYL) 10 mg capsule Take 10 mg by mouth daily as needed. metoprolol succinate (TOPROL-XL) 200 mg XL tablet Take 1 Tab by mouth daily. doxazosin (CARDURA) 4 mg tablet Take 1 Tab by mouth nightly. acetaminophen (TYLENOL) 500 mg tablet Take  by mouth every six (6) hours as needed for Pain. ACCU[place]CL[place] [facility] [place]     ACCU-CHEK TAMY PLUS TEST STRP strip     amLODIPine (NORVASC) 10 mg tablet Take  by mouth daily. Hydrochlorothiazide 12.5 mg tablet Take 25 mg by mouth daily. QNASL 80 mcg/actuation HFAA  (Patient not taking: No sig reported)    estradioL (ESTRACE) 0.01 % (0.1 mg/gram) vaginal cream Apply a fingertip amount to vagina twice a week (Patient not taking: No sig reported)     No current facility-administered medications for this visit.      Allergies   Allergen Reactions    Aspirin Other (comments)    Codeine Other (comments)     Gi distress      Cymbalta [Duloxetine] Other (comments)     Chest pain    Darvon [Propoxyphene] Rash and Itching     Gi distress    Dextromethorphan Other (comments)     Stomach cramps    Meperidine Hives     Gi distress    Morphine Other (comments)     Neurological reaction    Other Medication Palpitations     Muscle relaxants    Pcn [Penicillins] Swelling    Propoxyphene N-Acetaminophen Hives    Sulfa (Sulfonamide Antibiotics) Hives    Topamax [Topiramate] Other (comments)     Confusion even w/25mg     Social History     Occupational History    Not on file   Tobacco Use    Smoking status: Never    Smokeless tobacco: Never   Substance and Sexual Activity    Alcohol use: No    Drug use: No    Sexual activity: Not on file       reports that she has never smoked. She has never used smokeless tobacco. She reports that she does not drink alcohol and does not use drugs. DIAGNOSTIC LAB DATA      No results found for: HBA1C, LVD0XVKS, YDT9KXTC // No results found for: GLU, GLUCPOC     No results found for: ZIO7CQNU, IFY9UAUU      No results found for: VITD3, XQVID2, XQVID3, XQVID, VD3RIA, QHYF42SBWMZ     Drug Screen Most Recent Result Date    No resulted procedures found. REVIEW OF SYSTEMS : 11/14/2022  ALL BELOW ARE Negative except : SEE HPI     All other systems reviewed and are negative. 12 point review of systems otherwise negative unless noted in HPI. RADIOGRAPHS// IMAGING//DIAGNOSTIC DATA     Orders Placed This Encounter    Generic Supply Order    REFERRAL TO PHYSICAL THERAPY        I have personally reviewed the images of the above study. The interpretation of this study is my professional opinion           I have spent 30 minutes reviewing the previous notes, reviewing diagnostic studies [Advanced  Imaging, Diagnostic test results (x-rays)] and had a direct face to face with the patient discussing the diagnosis and importance of compliance with the treatment and plan. There is  discussion for the potential for surgery, answering all questions, as well as documenting patient care coordination for this individual on the day of the visit. Disclaimer:     Sections of this note are dictated using utilizing voice recognition software, which may have resulted in some phonetic based errors in grammar and contents.  Even though attempts were made to correct all the mistakes, some may have been missed, and remained in the body of the document. If questions arise, please contact our department. An electronic signature was used to authenticate this note. David Sanchez may have a reminder for a \"due or due soon\" health maintenance. I have asked that she contact her primary care provider for follow-up on this health maintenance. There are no Patient Instructions on file for this visit. Follow-up and Dispositions    Return in about 10 weeks (around 1/23/2023). Please follow up with your PCP for any health maintenance as recommended.                 Adrián Fagan  11/14/2022  9:23 AM

## 2022-11-18 ENCOUNTER — TELEPHONE (OUTPATIENT)
Dept: ORTHOPEDIC SURGERY | Age: 82
End: 2022-11-18

## 2022-11-18 DIAGNOSIS — M54.31 RIGHT SIDED SCIATICA: ICD-10-CM

## 2022-11-18 DIAGNOSIS — M48.062 SPINAL STENOSIS OF LUMBAR REGION WITH NEUROGENIC CLAUDICATION: Primary | ICD-10-CM

## 2022-11-18 NOTE — TELEPHONE ENCOUNTER
Patient called stating she can not continue Physical therapy w/o a new order. Patient requests Dr. Kulwinder Raza to prescribe additional PT seessions (2xweek). Patient requests a call back.     Patient can be reached at [phone]

## 2022-11-21 NOTE — TELEPHONE ENCOUNTER
I called and spoke to the pt. The pt was identified using 2 pt identifiers. She states that she went to the ER a couple of weeks ago for her back pain. They ordered a couple of weeks of physical therapy. She would like to continue on with it and says that it has been helping her. She is currently using a [facility] on [address] in [place]. I asked the pt if she has had the MRI done yet of her back. The pt states that she has not heard from anyone regarding this order. The pt wants to use [facility]. The order will need to be faxed over to their scheduling dept. A message has been sent to the referral coordinator to send the order there. This message will be routed back to the provider to see if a new order for PT will be approved. Pt is aware. No questions or concerns voiced at this time.

## 2022-11-21 NOTE — TELEPHONE ENCOUNTER
Can pend up a new PT order and have Dr Flower Maurice sign. Her insurance will not likely cover the MRI until she completes PT and is re-evaluated.

## 2022-11-21 NOTE — TELEPHONE ENCOUNTER
According to Dr Cheryle Mcclendon note she has failed PT and has been ordered a MRI. See what is going on.

## 2022-11-22 NOTE — TELEPHONE ENCOUNTER
Order has been pended for physical therapy.  Not sure what diagnosis or directions you would like to attach to the referral.

## 2022-12-15 ENCOUNTER — OFFICE VISIT (OUTPATIENT)
Dept: ORTHOPEDIC SURGERY | Age: 82
End: 2022-12-15
Payer: MEDICARE

## 2022-12-15 VITALS
HEART RATE: 75 BPM | TEMPERATURE: 97.6 F | OXYGEN SATURATION: 96 % | BODY MASS INDEX: 36.64 KG/M2 | SYSTOLIC BLOOD PRESSURE: 133 MMHG | WEIGHT: 228 LBS | DIASTOLIC BLOOD PRESSURE: 71 MMHG | HEIGHT: 66 IN

## 2022-12-15 DIAGNOSIS — G60.8 IDIOPATHIC SENSORIMOTOR AXONAL NEUROPATHY: ICD-10-CM

## 2022-12-15 DIAGNOSIS — M48.062 SPINAL STENOSIS OF LUMBAR REGION WITH NEUROGENIC CLAUDICATION: Primary | ICD-10-CM

## 2022-12-15 DIAGNOSIS — M54.31 RIGHT SIDED SCIATICA: ICD-10-CM

## 2022-12-15 NOTE — PROGRESS NOTES
[facility] [address]  [address], [address]  [facility], [address]  Phone: [phone]  Fax: [phone]        Pricilla Madden  : 1940  PCP: Virgilio Segovia DO    PROGRESS NOTE      ASSESSMENT AND PLAN    Diagnoses and all orders for this visit:    1. Spinal stenosis of lumbar region with neurogenic claudication    2. Right sided sciatica    3. Idiopathic sensorimotor axonal neuropathy      Taylor Holland is a 78 y.o. female with symptomatic lumbar stenosis and right sciatica. Other limitations include chronic right ankle pain, fibromyalgia, and neuropathy. .   Continue Voltaren Gel as needed  Patient offered Toradol injection. She is not interested at this time. As needed Tylenol during the day. Given instructions on bursa exercises. Perform as tolerated. Follow-up and Dispositions    Return in about 4 weeks (around 2023) for MRI/CT fu. HISTORY OF PRESENT ILLNESS      Taylor Holland is a 78 y.o. female presents for follow up of back pain. LV trial of Voltaren Gel, L MRI ordered, discontinued Topamax. Her lumbar MRI is scheduled for 2022. Pt reports left-sided low back pain radiating into her buttocks, right posterior thigh, and right posterior calf. Her symptoms are aggravated with standing and walking. She also complains of groin pain exacerbated with walking. Recently saw Dr. Brittny Huddleston for right Achilles tendinitis. She states Voltaren Gel helps some. Denies side effects. Pt denies any recent GI ulcers, bleeds or renal dysfunction. Pain Assessment  12/15/2022   Location of Pain Hip;Leg   Pain Location Comment -   Location Modifiers Right;Left   Severity of Pain 6   Quality of Pain Dull; Sharp   Quality of Pain Comment -   Duration of Pain Persistent   Frequency of Pain Constant   Date Pain First Started -   Aggravating Factors Other (Comment)   Aggravating Factors Comment doing too much in one day   Limiting Behavior Yes Relieving Factors Rest;Other (Comment)   Relieving Factors Comment stretches   Result of Injury No   Work-Related Injury -   Type of Injury -   Type of Injury Comment -         Investigations:  Right hip XR 10/2022: mild OA  RLE CT 10/2022: benign   Last L MRI 2019: Mild stenosis L3-4     Treatments patient has tried:  Physical therapy:Yes 7/2022 for low back - \"caused phlebitis\" discontinued, low back 2017  Beneficial medications: Tylenol, MDP  Failed medications: Cymbalta - chest pain. Unable to take MDP. Gabapentin-fear of side effects, Topamax - cognitive issues, Percocet - ineffective  Spinal injections: No     Spinal surgery: No.     Hx of renal cell carcinoma, neuropathy, pre-DM. Pt reports she has a fallen arch and uses a brace to prevent it from turning excessively. Pt of Dr. Ziegler, has a Dx of fibromyalgia and chronic ankle pain. She also consults with Dr. Angela Head. Pt has a -aid come out 3 times per week.      PHYSICAL EXAMINATION    Visit Vitals  /71 (BP 1 Location: Right upper arm, BP Patient Position: Sitting, BP Cuff Size: Large adult)   Pulse 75   Temp 97.6 °F (36.4 °C) (Temporal)   Ht 5' 6\" (1.676 m)   Wt 228 lb (103.4 kg)   SpO2 96% Comment: RA   BMI 36.80 kg/m²       Tender midline sacrum, B/L SIJ, right sciatic notch, B/L trochanteric bursa  LE strength intact  SLR negative  Edema R > L ankle  Ambulates with mild limp  Flat footed on the right  Bursa pain with left hip ROM, no increased pain with right hip ROM              Written by Ruthann Smallwood, as dictated by Katherine Diaz MD.

## 2022-12-15 NOTE — LETTER
12/15/2022    Patient: Rosario Dougherty   YOB: 1940   Date of Visit: 12/15/2022     Chandan Braga, [address] [facility] [address]  Via Fax: [phone]    Dear Chandan Braga DO,      Thank you for referring Ms. Cece Christine to [facility] for evaluation. My notes for this consultation are attached. If you have questions, please do not hesitate to call me. I look forward to following your patient along with you.       Sincerely,    Effie Meek MD

## 2022-12-15 NOTE — PROGRESS NOTES
Brody López presents today for   Chief Complaint   Patient presents with    Hip Pain     Bilateral     Leg Pain     Left         Is someone accompanying this pt? no    Is the patient using any DME equipment during OV? no    Depression Screening:  3 most recent PHQ Screens 12/15/2022   Little interest or pleasure in doing things Not at all   Feeling down, depressed, irritable, or hopeless Not at all   Total Score PHQ 2 0       Learning Assessment:  Learning Assessment 8/22/2019   PRIMARY LEARNER Patient   HIGHEST LEVEL OF EDUCATION - PRIMARY LEARNER  -   PRIMARY LANGUAGE ENGLISH   LEARNER PREFERENCE PRIMARY OTHER (COMMENT)   ANSWERED BY patient   RELATIONSHIP SELF       Abuse Screening:  Abuse Screening Questionnaire 9/21/2022   Do you ever feel afraid of your partner? N   Are you in a relationship with someone who physically or mentally threatens you? N   Is it safe for you to go home? Y       Fall Risk  Fall Risk Assessment, last 12 mths 11/3/2022   Able to walk? Yes   Fall in past 12 months? 0   Do you feel unsteady? 1   Are you worried about falling 0       Coordination of Care:  1. Have you been to the ER, urgent care clinic since your last visit? no  Hospitalized since your last visit? no    2. Have you seen or consulted any other health care providers outside of the [address] [place] [address] since your last visit? Yes, Dr. Vicie Claude any pap smears or colon screening.  no

## 2022-12-15 NOTE — PATIENT INSTRUCTIONS
Hip Bursitis: Exercises  Introduction  Here are some examples of exercises for you to try. The exercises may be suggested for a condition or for rehabilitation. Start each exercise slowly. Ease off the exercises if you start to have pain. You will be told when to start these exercises and which ones will work best for you. How to do the exercises  Hip rotator stretch  Lie on your back with both knees bent and your feet flat on the floor. Put the ankle of your affected leg on your opposite thigh near your knee. Use your hand to gently push your knee away from your body until you feel a gentle stretch around your hip. Hold the stretch for 15 to 30 seconds. Repeat 2 to 4 times. Repeat steps 1 through 5, but this time use your hand to gently pull your knee toward your opposite shoulder. Iliotibial band stretch  Lean sideways against a wall. If you are not steady on your feet, hold on to a chair or counter. Stand on the leg with the affected hip, with that leg close to the wall. Then cross your other leg in front of it. Let your affected hip drop out to the side of your body and against wall. Then lean away from your affected hip until you feel a stretch. Hold the stretch for 15 to 30 seconds. Repeat 2 to 4 times. Straight-leg raises to the outside  Lie on your side, with your affected hip on top. Tighten the front thigh muscles of your top leg to keep your knee straight. Keep your hip and your leg straight in line with the rest of your body, and keep your knee pointing forward. Do not drop your hip back. Lift your top leg straight up toward the ceiling, about 12 inches off the floor. Hold for about 6 seconds, then slowly lower your leg. Repeat 8 to 12 times. Clamshell  Lie on your side, with your affected hip on top and your head propped on a pillow. Keep your feet and knees together and your knees bent. Raise your top knee, but keep your feet together. Do not let your hips roll back.  Your legs should open up like a clamshell. Hold for 6 seconds. Slowly lower your knee back down. Rest for 10 seconds. Repeat 8 to 12 times. Follow-up care is a key part of your treatment and safety. Be sure to make and go to all appointments, and call your doctor if you are having problems. It's also a good idea to know your test results and keep a list of the medicines you take. Current as of: March 9, 2022               Content Version: 13.4  © 2006-2022 [employer], [employer]. Care instructions adapted under license by [employer] (which disclaims liability or warranty for this information). If you have questions about a medical condition or this instruction, always ask your healthcare professional. [address] any warranty or liability for your use of this information.

## 2023-02-01 ENCOUNTER — TRANSCRIBE ORDERS (OUTPATIENT)
Facility: HOSPITAL | Age: 83
End: 2023-02-01

## 2023-02-01 DIAGNOSIS — M48.062 SPINAL STENOSIS OF LUMBAR REGION WITH NEUROGENIC CLAUDICATION: Primary | ICD-10-CM

## 2023-02-01 DIAGNOSIS — M54.31 RIGHT SIDED SCIATICA: ICD-10-CM

## 2023-02-27 PROBLEM — D12.2 BENIGN NEOPLASM OF ASCENDING COLON: Status: ACTIVE | Noted: 2023-02-27

## 2023-02-27 PROBLEM — R14.3 FLATULENCE: Status: ACTIVE | Noted: 2023-02-27

## 2023-02-27 PROBLEM — K29.90 GASTRODUODENITIS: Status: ACTIVE | Noted: 2023-02-27

## 2023-02-27 PROBLEM — K31.7 GASTRIC POLYP: Status: ACTIVE | Noted: 2023-02-27

## 2023-02-27 PROBLEM — R07.9 CHEST PAIN: Status: ACTIVE | Noted: 2023-02-27

## 2023-02-27 PROBLEM — R10.13 EPIGASTRIC PAIN: Status: ACTIVE | Noted: 2023-02-27

## 2023-02-27 PROBLEM — R13.10 DYSPHAGIA: Status: ACTIVE | Noted: 2023-02-27

## 2023-02-27 PROBLEM — R10.84 GENERALIZED ABDOMINAL PAIN: Status: ACTIVE | Noted: 2023-02-27

## 2023-02-27 PROBLEM — K44.9 DIAPHRAGMATIC HERNIA: Status: ACTIVE | Noted: 2023-02-27

## 2023-02-27 PROBLEM — K64.0 FIRST DEGREE HEMORRHOIDS: Status: ACTIVE | Noted: 2023-02-27

## 2023-03-01 ENCOUNTER — OFFICE VISIT (OUTPATIENT)
Age: 83
End: 2023-03-01

## 2023-03-01 ENCOUNTER — HOSPITAL ENCOUNTER (OUTPATIENT)
Facility: HOSPITAL | Age: 83
Discharge: HOME | End: 2023-03-04
Payer: MEDICARE

## 2023-03-01 VITALS — BODY MASS INDEX: 36.64 KG/M2 | WEIGHT: 227 LBS

## 2023-03-01 DIAGNOSIS — M76.61 ACHILLES TENDINITIS, RIGHT LEG: Primary | ICD-10-CM

## 2023-03-01 DIAGNOSIS — M48.062 SPINAL STENOSIS OF LUMBAR REGION WITH NEUROGENIC CLAUDICATION: ICD-10-CM

## 2023-03-01 DIAGNOSIS — M54.31 RIGHT SIDED SCIATICA: ICD-10-CM

## 2023-03-01 PROCEDURE — 72148 MRI LUMBAR SPINE W/O DYE: CPT

## 2023-03-01 NOTE — PROGRESS NOTES
AMBULATORY PROGRESS NOTE      Patient: Adalid Alcocer             MRN: [id number]     SSN: [id number] Body mass index is 36.64 kg/m². YOB: 1940     AGE: 78 y.o. EX: female    PCP: Deborah Oropeza DO       IMPRESSION //  DIAGNOSIS AND TREATMENT PLAN      Adalid Alcocer has a diagnosis of:      Focal tenderness to the focal region of the medial gastroc right and left, without evidence of DVT or thrombophlebitis. Regions of tenderness, or about 3 cm x 3 cm focal on the medial gastroc and only bilaterally. DIAGNOSES    1. Achilles tendinitis, right leg            PLAN:    1. I referred the patient to physical therapy for Graston technique. 2. DME: knee high compression socks    RTO 6 weeks    Orders Placed This Encounter    Diabetic Shoe    [facility] - In [address] [place] [address]     Referral Priority:   Routine     Referral Type:   Eval and Treat     Referral Reason:   Specialty Services Required     Requested Specialty:   Physical Therapist     Number of Visits Requested:   1    Compression Stocking     10-15 compression        Patient Instructions   [facility] [address]  [phone]   DME: knee high compression socks  DME: neoprene figure 8 brace     Types of Shoes from One Foot Two Foot:  -Propet  -Dr. Che Zambrano  -Dr. Jackman Memos      Please follow up with your PCP for any health maintenance as recommended. Adalid Alcocer  expresses understanding of the diagnosis, treatment plan, and all of their proposed questions were answered to their satisfaction. Patient education has been provided re the diagnoses.          HPI //  Becky Carlos IS A 78 y.o. female who is a/an  established patient, presenting to my outpatient office for evaluation of  the following chief complaint(s):     Chief Complaint   Patient presents with    Foot Pain     right       At [facility] [place], Adalid Alcocer presented with Achilles tendinitis of right lower extremity and posterior tibial tendinitis of right lower extremity. I recommended patient to formal physical therapy for her Achilles tendinitis. Since Antonio Gonzalez presents today with Achilles tendinitis. She reports that wearing shoes regardless of the type, cause her significant pain and discomfort. She reports that she was diagnosed with phlebitis and neuropathy. She states that she was engaged in physical therapy but was withdrawn by Dr. Chaparro Hayes until her MRI results are back for reading. Of note, Hiral Campo reports that she has compression stockings at home that helps her with her pain. She reports that whenever she takes the compression stockings off, her pain and discomfort increases. Wt 227 lb (103 kg)   BMI 36.64 kg/m²     Appearance: Alert, well appearing and pleasant patient who is in no distress, oriented to person, place/time, and who follows commands. Normal dress/motor activity/thought processes/memory. This patient is accompanied in the examination room by her self. Patient arrives to office via: without assistive device. Footwear: strap up slides    Psychiatric:  Normal Affect/mood. Judgement, behavior, and conduct are appropriate. Speech normal in context and clarity, memory intact grossly, no involuntary movements - tremors. H EENT (2): Head normocephalic & atraumatic. Eye: pupils are round// EOM are intact // Neck: ROM WNL  // Hearings Intact  Respiratory: Breathing non labored     ANKLE/FOOT Right    Gait:  normal  Tenderness: mild tenderness to the medial graston gastroc focal region on the right and on the left. Nontender the tibial shaft bilaterally. Nontender to the Achilles bilaterally. Cutaneous: Mild collection of varicose veins to the anterior portion of each ankle  Joint Motion: Ankle bilaterally, has range of motion that are that is within normal limits.   Joint / Tendon Stability:  No Ankle or Subtalar instability or joint laxity. No peroneal sublux ability or dislocation  Alignment: neutral Hindfoot,    Neuro Motor/Sensory: NL/NL  Vascular: NL foot/ankle pulses,   Lymphatics: No extremity lymphedema, No calf swelling, no tenderness to calf muscles. RADIOGRAPHS// IMAGING//DIAGNOSTIC DATA     Orders Placed This Encounter   Procedures    Diabetic Shoe    [facility] Physical [address] [place] [address]     Referral Priority:   Routine     Referral Type:   Eval and Treat     Referral Reason:   Specialty Services Required     Requested Specialty:   Physical Therapist     Number of Visits Requested:   1    Compression Stocking     10-15 compression          I have personally reviewed the images of the above study. The interpretation of this study is my professional opinion           CHART REVIEW     Wes Samuel has been experiencing pain and discomfort confirmed as outlined in the pain assessment outlined below.  was reviewed by Edward Hurd. Alan Valdez MD  on 3/1/2023. No flowsheet data found. PAST MEDICAL HISTORY:   Past Medical History:   Diagnosis Date    Arthritis     Arthropathy, unspecified, site unspecified     Bruising     bruising and bleeding    Diverticulitis     Diverticulosis     Essential hypertension     Fibromyalgia     GERD (gastroesophageal reflux disease)     Hearing loss     Idiopathic sensorimotor axonal neuropathy 4/15/2018    Obesity (BMI 30-39. 9)     Postoperative respiratory failure ([address] 9/30/10    Pre-diabetes     11/16 diet controlled    Renal cell cancer ([address]     Stage T1NxMx    Respiratory failure, post-operative ([address]     Wears glasses      PAST SURGICAL HISTORY:   Past Surgical History:   Procedure Laterality Date    BREAST BIOPSY      BREAST REDUCTION SURGERY      CATARACT REMOVAL      CHOLECYSTECTOMY      HERNIA REPAIR      HYSTERECTOMY (CERVIX STATUS UNKNOWN)  1984     total vaginal    OTHER SURGICAL HISTORY  9/2010    SIGMOIDECTOMY    VEIN SURGERY ALLERGIES:   Allergies   Allergen Reactions    Aspirin Other (See Comments)    Codeine Other (See Comments)     Gi distress    Dexbrompheniramine      Other reaction(s): unknown    Dextromethorphan Other (See Comments)     Stomach cramps    Dextromethorphan-Guaifenesin      Other reaction(s): stomach upset    Duloxetine Other (See Comments)     Chest pain    Meperidine Hives     Gi distress    Morphine Other (See Comments)     Neurological reaction    Penicillins Swelling    Sulfa Antibiotics Hives     Other reaction(s): rash    Topiramate Other (See Comments)     Confusion even w/25mg    Propoxyphene Itching and Rash     Gi distress      FAMILY HISTORY:   Family History   Problem Relation Age of Onset    Heart Attack Sister 71    Heart Disease Other     Osteoarthritis Other     Hypertension Other     Diabetes Other     Heart Attack Mother 79     SOCIAL HISTORY:   Social History     Socioeconomic History    Marital status:      Spouse name: None    Number of children: None    Years of education: None    Highest education level: None   Tobacco Use    Smoking status: Never    Smokeless tobacco: Never   Vaping Use    Vaping Use: Never used   Substance and Sexual Activity    Alcohol use: No    Drug use: No    Sexual activity: Not Currently       CURRENT MEDICATIONS:  A list of medications prior to the time of admission include:  Prior to Admission medications    Medication Sig Start Date End Date Taking? Authorizing Provider   lidocaine (LIDODERM) 5 % Apply 1 patch as directed for 12 hours every 24 hours (12 hours on, 12 hours off) 10/24/22  Yes Historical Provider, MD   potassium chloride (KLOR-CON M) 20 MEQ TBCR extended release tablet  1/22/23  Yes Historical Provider, MD Sullivanu-Margaretk Softclix Lancets MISC E clarify this medication.  Outside name: ACCU-CHEK SOFTCLIX LANCETS misc 9/9/16  Yes Ar Automatic Reconciliation   acetaminophen (TYLENOL) 500 MG tablet Take by mouth every 6 hours as needed   Yes Ar Automatic Reconciliation   amLODIPine (NORVASC) 10 MG tablet Take 10 mg by mouth daily   Yes Ar Automatic Reconciliation   dicyclomine (BENTYL) 10 MG capsule Take 10 mg by mouth daily as needed 3/17/20  Yes Ar Automatic Reconciliation   doxazosin (CARDURA) 4 MG tablet Take 4 mg by mouth 3/2/17  Yes Ar Automatic Reconciliation   hydroCHLOROthiazide (HYDRODIURIL) 25 MG tablet Take 25 mg by mouth daily 10/13/22  Yes Ar Automatic Reconciliation   metoprolol succinate (TOPROL XL) 200 MG extended release tablet Take 200 mg by mouth daily 3/2/17  Yes Ar Automatic Reconciliation   potassium chloride (KLOR-CON M) 20 MEQ extended release tablet Take 20 mEq by mouth daily 1/21/22  Yes Ar Automatic Reconciliation   traMADol (ULTRAM) 50 MG tablet Take 50 mg by mouth daily as needed. 4/8/22  Yes Ar Automatic Reconciliation   omeprazole (PRILOSEC) 20 MG delayed release capsule Take 80 mg by mouth every morning (before breakfast)  Patient not taking: No sig reported    Historical Provider, MD   pantoprazole (PROTONIX) 40 MG tablet Take 40 mg by mouth 2 times daily  Patient not taking: No sig reported 3/4/21   Ar Automatic Reconciliation       Current Outpatient Medications   Medication Sig    lidocaine (LIDODERM) 5 % Apply 1 patch as directed for 12 hours every 24 hours (12 hours on, 12 hours off)    potassium chloride (KLOR-CON M) 20 MEQ TBCR extended release tablet     Accu-Chek Softclix Lancets MISC E clarify this medication.  Outside name: ACCU-CHEK SOFTCLIX LANCETS misc    acetaminophen (TYLENOL) 500 MG tablet Take by mouth every 6 hours as needed    amLODIPine (NORVASC) 10 MG tablet Take 10 mg by mouth daily    dicyclomine (BENTYL) 10 MG capsule Take 10 mg by mouth daily as needed    doxazosin (CARDURA) 4 MG tablet Take 4 mg by mouth    hydroCHLOROthiazide (HYDRODIURIL) 25 MG tablet Take 25 mg by mouth daily    metoprolol succinate (TOPROL XL) 200 MG extended release tablet Take 200 mg by mouth daily    potassium chloride (KLOR-CON M) 20 MEQ extended release tablet Take 20 mEq by mouth daily    traMADol (ULTRAM) 50 MG tablet Take 50 mg by mouth daily as needed. omeprazole (PRILOSEC) 20 MG delayed release capsule Take 80 mg by mouth every morning (before breakfast) (Patient not taking: No sig reported)    pantoprazole (PROTONIX) 40 MG tablet Take 40 mg by mouth 2 times daily (Patient not taking: No sig reported)     No current facility-administered medications for this visit. DIAGNOSTIC LAB DATA      No results found for this or any previous visit from the past 3650 days. No results found for this or any previous visit (from the past 4464 hour(s)). No results found for: WBC, HGB, HCT, MCV, PLT, LABLYMP, MID, GRAN, LYMPHOPCT, MIDPERCENT, GRANULOCYTES, RBC, MCH, MCHC, RDW  No results found for: LABA1C,  No results found for: NA, K, CL, CO2, BUN, CREATININE, GLUCOSE, CALCIUM, PROT, LABALBU, BILITOT, ALKPHOS, AST, ALT, LABGLOM, GFRAA, AGRATIO, GLOB  No results found for: VITD25   No results found for: INR, PROTIME  No results found for: APTT       REVIEW OF SYSTEMS : 3/1/2023  ALL BELOW ARE Negative except : SEE HPI     All other systems reviewed and are negative. 14 point review of systems otherwise negative unless noted in HPI. I have spent 20 minutes reviewing the previous notes, reviewing diagnostic studies [Advanced  Imaging, Diagnostic test results (x-rays)] and had a direct face to face with the patient discussing the diagnosis and importance of compliance with the treatment and plan. The treatment plan is listed in the above plan section of this Office encounter. I  answered all of her questions, as well as documenting patient care coordination for this individual on the day of the visit. Disclaimer:     Sections of this note are dictated using utilizing voice recognition software, which may have resulted in some phonetic based errors in grammar and contents.  Even though attempts were made to correct all the mistakes, some may have been missed, and remained in the body of the document. If questions arise, please contact our department. An electronic signature was used to authenticate this note. Abdullahi Whiteside may have a reminder for a \"due or due soon\" health maintenance. I have asked that she contact her primary care provider for follow-up on this health maintenance.            Scribed by Marylee Re ([address]), as dictated by MD nader Lara  3/1/2023  4:29 PM

## 2023-03-01 NOTE — PATIENT INSTRUCTIONS
[facility]  [address] [address], [address] [place] [address]  [phone]   DME: knee high compression socks  DME: neoprene figure 8 brace     Types of Shoes from One Foot Two Foot:  -Propet  -Dr. Sandie Araya  -Dr. Zaragoza Socks

## 2023-03-02 ENCOUNTER — TELEPHONE (OUTPATIENT)
Age: 83
End: 2023-03-02

## 2023-03-02 NOTE — TELEPHONE ENCOUNTER
Patient called and said she saw Caitlyn Gurrola yesterday for both of her feet. Patient said that Caitlyn Gurrola forgot to give her an order for a Light Weight Brace for her Right Knee. That he gave her the order for the Diabetic Shoe and a Compression stocking , but not one for a Light Weight Brace. Patient said she will be getting all the items from [facility]. Patient tel. [phone].

## 2023-03-07 ENCOUNTER — OFFICE VISIT (OUTPATIENT)
Age: 83
End: 2023-03-07

## 2023-03-07 VITALS — HEIGHT: 66 IN | WEIGHT: 226 LBS | TEMPERATURE: 98.4 F | BODY MASS INDEX: 36.32 KG/M2

## 2023-03-07 DIAGNOSIS — M75.102 TEAR OF LEFT ROTATOR CUFF, UNSPECIFIED TEAR EXTENT, UNSPECIFIED WHETHER TRAUMATIC: Primary | ICD-10-CM

## 2023-03-07 DIAGNOSIS — M25.512 CHRONIC LEFT SHOULDER PAIN: ICD-10-CM

## 2023-03-07 DIAGNOSIS — G89.29 CHRONIC LEFT SHOULDER PAIN: ICD-10-CM

## 2023-03-07 RX ORDER — TRIAMCINOLONE ACETONIDE 40 MG/ML
40 INJECTION, SUSPENSION INTRA-ARTICULAR; INTRAMUSCULAR ONCE
Status: COMPLETED | OUTPATIENT
Start: 2023-03-07 | End: 2023-03-07

## 2023-03-07 RX ADMIN — TRIAMCINOLONE ACETONIDE 40 MG: 40 INJECTION, SUSPENSION INTRA-ARTICULAR; INTRAMUSCULAR at 15:18

## 2023-03-07 NOTE — PROGRESS NOTES
Cheyanne Ny  1940   Chief Complaint   Patient presents with    Shoulder Pain     Left shoulder          HISTORY OF PRESENT ILLNESS  Cheyanne Ny is a 78 y.o. female who presents today for evaluation of left shoulder pain. Pain is a 6/10. Pain has been present for awhile and has been worsening. No specific injury. No previous surgeries. She also has pain in the neck. She has pain with using the arm and laying on her side. She has had previous cortisone injections. Has tried following treatments: Injections:Yes; Brace:No; Therapy:No; Cane/Crutch:No      Allergies   Allergen Reactions    Aspirin Other (See Comments)    Codeine Other (See Comments)     Gi distress    Dexbrompheniramine      Other reaction(s): unknown    Dextromethorphan Other (See Comments)     Stomach cramps    Dextromethorphan-Guaifenesin      Other reaction(s): stomach upset    Duloxetine Other (See Comments)     Chest pain    Meperidine Hives     Gi distress    Morphine Other (See Comments)     Neurological reaction    Penicillins Swelling    Sulfa Antibiotics Hives     Other reaction(s): rash    Topiramate Other (See Comments)     Confusion even w/25mg    Propoxyphene Itching and Rash     Gi distress        Past Medical History:   Diagnosis Date    Arthritis     Arthropathy, unspecified, site unspecified     Bruising     bruising and bleeding    Diverticulitis     Diverticulosis     Essential hypertension     Fibromyalgia     GERD (gastroesophageal reflux disease)     Hearing loss     Idiopathic sensorimotor axonal neuropathy 4/15/2018    Obesity (BMI 30-39. 9)     Postoperative respiratory failure ([address] 9/30/10    Pre-diabetes     11/16 diet controlled    Renal cell cancer ([address]     Stage T1NxMx    Respiratory failure, post-operative ([address]     Wears glasses       Social History       Tobacco History       Smoking Status  Never      Smokeless Tobacco Use  Never              Alcohol History       Alcohol Use Status  No Drug Use       Drug Use Status  No              Sexual Activity       Sexually Active  Not Currently                   Past Surgical History:   Procedure Laterality Date    BREAST BIOPSY      BREAST REDUCTION SURGERY      CATARACT REMOVAL      CHOLECYSTECTOMY      HERNIA REPAIR      HYSTERECTOMY (CERVIX STATUS UNKNOWN)  1984     total vaginal    OTHER SURGICAL HISTORY  9/2010    SIGMOIDECTOMY    VEIN SURGERY        Family History   Problem Relation Age of Onset    Heart Attack Sister 71    Heart Disease Other     Osteoarthritis Other     Hypertension Other     Diabetes Other     Heart Attack Mother 79     Current Outpatient Medications   Medication Sig    lidocaine (LIDODERM) 5 % Apply 1 patch as directed for 12 hours every 24 hours (12 hours on, 12 hours off)    omeprazole (PRILOSEC) 20 MG delayed release capsule Take 80 mg by mouth every morning (before breakfast) (Patient not taking: No sig reported)    potassium chloride (KLOR-CON M) 20 MEQ TBCR extended release tablet     Accu-Chek Softclix Lancets MISC E clarify this medication. Outside name: ACCU-CHEK SOFTCLIX LANCETS misc    acetaminophen (TYLENOL) 500 MG tablet Take by mouth every 6 hours as needed    amLODIPine (NORVASC) 10 MG tablet Take 10 mg by mouth daily    dicyclomine (BENTYL) 10 MG capsule Take 10 mg by mouth daily as needed    doxazosin (CARDURA) 4 MG tablet Take 4 mg by mouth    hydroCHLOROthiazide (HYDRODIURIL) 25 MG tablet Take 25 mg by mouth daily    metoprolol succinate (TOPROL XL) 200 MG extended release tablet Take 200 mg by mouth daily    pantoprazole (PROTONIX) 40 MG tablet Take 40 mg by mouth 2 times daily (Patient not taking: No sig reported)    potassium chloride (KLOR-CON M) 20 MEQ extended release tablet Take 20 mEq by mouth daily    traMADol (ULTRAM) 50 MG tablet Take 50 mg by mouth daily as needed. No current facility-administered medications for this visit.        REVIEW OF SYSTEM   Patient denies: Weight loss, Fever/Chills, HA, Visual changes, Fatigue, Chest pain, SOB, Abdominal pain, N/V/D/C, Blood in stool or urine, Edema. Pertinent positive as above in HPI. All others were negative    PHYSICAL EXAM:   Temp 98.4 °F (36.9 °C) (Temporal)   Ht 5' 6\" (1.676 m)   Wt 226 lb (102.5 kg)   BMI 36.48 kg/m²   The patient is a well-developed, well-nourished female   in no acute distress. The patient is alert and oriented times three. The patient is alert and oriented times three. Mood and affect are normal.  LYMPHATIC: lymph nodes are not enlarged and are within normal limits  SKIN: normal in color and non tender to palpation. There are no bruises or abrasions noted. NEUROLOGICAL: Motor sensory exam is within normal limits. Reflexes are equal bilaterally. There is normal sensation to pinprick and light touch  MUSCULOSKELETAL:  Examination Left shoulder   Skin Intact   AC joint tenderness -   Biceps tenderness -   Forward flexion/Elevation    Active abduction    Glenohumeral abduction 90   External rotation ROM 45   Internal rotation ROM 30   Apprehension -   Aquiless Relocation -   Jerk -   Load and Shift -   Obriens -   Speeds -   Impingement sign +   Supraspinatus/Empty Can -, 5/5   External Rotation Strength -, 5/5   Lift Off/Belly Press -, 5/5   Neurovascular Intact        PROCEDURE: left shoulder Injection with Ultrasound Guidance    Indication:  Left shoulder pain/swelling    After sterile prep, 6mL lidocaine with 1mL 40mg Kenalog were injected into the left  shoulder intrabursal under ultrasound guidance. Ultrasound images captured and scanned into patient's chart.         [facility]  OFFICE PROCEDURE PROGRESS NOTE        Chart reviewed for the following:  Nicholas Guadalupe MD, have reviewed the History, Physical and updated the Allergic reactions for [address] performed immediately prior to start of procedure:  Nicholas Guadalupe MD have performed the following reviews on Hayes Rose prior to the start of the procedure:            * Patient was identified by name and date of birth   * Agreement on procedure being performed was verified  * Risks and Benefits explained to the patient  * Procedure site verified and marked as necessary  * Patient was positioned for comfort  * Consent was signed and verified     Time: 3:14 PM    Date of procedure: 3/7/2023    Procedure performed by:  Elicia Linares MD    Provider assisted by: (see medication administration)    How tolerated by patient: tolerated the procedure well with no complications    Comments: none      IMAGING: XR of the left shoulder with 3 views obtained in the office dated 3/07/2023 reviewed and read by Dr. Yumiko Arias: Moderate degenerative changes in the glenohumeral joint      IMPRESSION:      ICD-10-CM    1. Tear of left rotator cuff, unspecified tear extent, unspecified whether traumatic  M75.102       2. Chronic left shoulder pain  M25.512 AMB POC XRAY, SHOULDER; COMPLETE, 2+    G89.29            PLAN:   1. Pt presents today with left shoulder pain c/w possible RCT and I am hopeful a cortisone injection will provide relief. Patient was provided with physician-directed home exercise program in the office today. Risk factors include: htn, BMI>35  2. Yes cortisone injection indicated today L SHOULDER US  3. No Physical/Occupational Therapy indicated today  4. No diagnostic test indicated today   5. No durable medical equipment indicated today  6. No referral indicated today   7. No medications indicated today:   8. No Narcotic indicated today     RTC 3 weeks if pain continues     Scribed by Juma Willoughby) as dictated by Kyle Cowan MD    I, Dr. Kyle Cowan, confirm that all documentation is accurate.     Kyle Cowan M.D.   [facility]

## 2023-03-30 ENCOUNTER — OFFICE VISIT (OUTPATIENT)
Age: 83
End: 2023-03-30
Payer: MEDICARE

## 2023-03-30 ENCOUNTER — TELEPHONE (OUTPATIENT)
Age: 83
End: 2023-03-30

## 2023-03-30 VITALS
DIASTOLIC BLOOD PRESSURE: 69 MMHG | OXYGEN SATURATION: 97 % | BODY MASS INDEX: 36.4 KG/M2 | HEART RATE: 67 BPM | HEIGHT: 66 IN | WEIGHT: 226.5 LBS | TEMPERATURE: 97.8 F | RESPIRATION RATE: 18 BRPM | SYSTOLIC BLOOD PRESSURE: 131 MMHG

## 2023-03-30 DIAGNOSIS — M47.816 LUMBAR FACET ARTHROPATHY: Primary | ICD-10-CM

## 2023-03-30 DIAGNOSIS — M54.31 RIGHT SIDED SCIATICA: ICD-10-CM

## 2023-03-30 PROCEDURE — G8427 DOCREV CUR MEDS BY ELIG CLIN: HCPCS | Performed by: PHYSICAL MEDICINE & REHABILITATION

## 2023-03-30 PROCEDURE — G8417 CALC BMI ABV UP PARAM F/U: HCPCS | Performed by: PHYSICAL MEDICINE & REHABILITATION

## 2023-03-30 PROCEDURE — G8484 FLU IMMUNIZE NO ADMIN: HCPCS | Performed by: PHYSICAL MEDICINE & REHABILITATION

## 2023-03-30 PROCEDURE — G8400 PT W/DXA NO RESULTS DOC: HCPCS | Performed by: PHYSICAL MEDICINE & REHABILITATION

## 2023-03-30 PROCEDURE — 3078F DIAST BP <80 MM HG: CPT | Performed by: PHYSICAL MEDICINE & REHABILITATION

## 2023-03-30 PROCEDURE — 99213 OFFICE O/P EST LOW 20 MIN: CPT | Performed by: PHYSICAL MEDICINE & REHABILITATION

## 2023-03-30 PROCEDURE — 1090F PRES/ABSN URINE INCON ASSESS: CPT | Performed by: PHYSICAL MEDICINE & REHABILITATION

## 2023-03-30 PROCEDURE — 3075F SYST BP GE 130 - 139MM HG: CPT | Performed by: PHYSICAL MEDICINE & REHABILITATION

## 2023-03-30 PROCEDURE — 1036F TOBACCO NON-USER: CPT | Performed by: PHYSICAL MEDICINE & REHABILITATION

## 2023-03-30 PROCEDURE — 1123F ACP DISCUSS/DSCN MKR DOCD: CPT | Performed by: PHYSICAL MEDICINE & REHABILITATION

## 2023-03-30 RX ORDER — BLOOD SUGAR DIAGNOSTIC
STRIP MISCELLANEOUS
COMMUNITY
Start: 2023-03-25

## 2023-03-30 RX ORDER — KETOCONAZOLE 20 MG/ML
SHAMPOO TOPICAL
COMMUNITY
Start: 2023-03-27

## 2023-03-30 NOTE — PROGRESS NOTES
Karen Mcfarland presents today for   Chief Complaint   Patient presents with    Back Problem    Pain    Back Pain    Follow-up       Is someone accompanying this pt? NO    Is the patient using any DME equipment during OV? NO    Depression Screening:  No flowsheet data found. Learning Assessment:  No flowsheet data found. Abuse Screening:  No flowsheet data found. Fall Risk  No flowsheet data found. OPIOID RISK TOOL  No flowsheet data found. Coordination of Care:  1. Have you been to the ER, urgent care clinic since your last visit? NO  Hospitalized since your last visit? NO    2. Have you seen or consulted any other health care providers outside of the [address] [place] [address] since your last visit? NO Include any pap smears or colon screening.  NO
discontinued, low back 2017  Beneficial medications: Tylenol,  Failed medications: Cymbalta - chest pain. Unable to take MDP. Gabapentin-fear of side effects, Topamax - cognitive issues, Percocet - ineffective. Lidoderm patches  Spinal injections: No      Spinal surgery: No.      Hx of renal cell carcinoma, neuropathy, pre-DM. Pt reports she has a fallen arch and uses a brace to prevent it from turning excessively. Pt of Dr. Ashley Oropeza, has a Dx of fibromyalgia and chronic ankle pain. She also consults with Dr. Iris Lopez. Pt has  a -aid come out 3 times per week. PHYSICAL EXAMINATION    /69 (Site: Left Upper Arm, Position: Sitting, Cuff Size: Medium Adult)   Pulse 67   Temp 97.8 °F (36.6 °C) (Temporal)   Resp 18   Ht 5' 6\" (1.676 m)   Wt 226 lb 8 oz (102.7 kg)   SpO2 97% Comment: RA  BMI 36.56 kg/m²     Tender midline sacrum, B/L SIJ, B/L trochanteric bursa  LE motor intact  SLR neg  No clonus                Written by Maira Sears, as dictated by Rolando Novoa MD.  This note was created using Dragon transcription software and may contain unintended errors.

## 2023-04-27 ENCOUNTER — OFFICE VISIT (OUTPATIENT)
Age: 83
End: 2023-04-27

## 2023-04-27 VITALS — BODY MASS INDEX: 36.32 KG/M2 | WEIGHT: 226 LBS | HEIGHT: 66 IN

## 2023-04-27 DIAGNOSIS — M25.562 PAIN IN BOTH KNEES, UNSPECIFIED CHRONICITY: ICD-10-CM

## 2023-04-27 DIAGNOSIS — M17.12 PRIMARY OSTEOARTHRITIS OF LEFT KNEE: Primary | ICD-10-CM

## 2023-04-27 DIAGNOSIS — M75.102 TEAR OF LEFT ROTATOR CUFF, UNSPECIFIED TEAR EXTENT, UNSPECIFIED WHETHER TRAUMATIC: ICD-10-CM

## 2023-04-27 DIAGNOSIS — M17.11 PRIMARY OSTEOARTHRITIS OF RIGHT KNEE: ICD-10-CM

## 2023-04-27 DIAGNOSIS — M25.561 PAIN IN BOTH KNEES, UNSPECIFIED CHRONICITY: ICD-10-CM

## 2023-04-27 RX ORDER — TRIAMCINOLONE ACETONIDE 40 MG/ML
40 INJECTION, SUSPENSION INTRA-ARTICULAR; INTRAMUSCULAR ONCE
Status: COMPLETED | OUTPATIENT
Start: 2023-04-27 | End: 2023-04-27

## 2023-04-27 RX ADMIN — TRIAMCINOLONE ACETONIDE 40 MG: 40 INJECTION, SUSPENSION INTRA-ARTICULAR; INTRAMUSCULAR at 12:01

## 2023-04-27 RX ADMIN — TRIAMCINOLONE ACETONIDE 40 MG: 40 INJECTION, SUSPENSION INTRA-ARTICULAR; INTRAMUSCULAR at 12:00

## 2023-04-27 NOTE — PROGRESS NOTES
Rigo Jessica  1940   Chief Complaint   Patient presents with    Shoulder Pain     Lt     Knee Pain     Bilat         HISTORY OF PRESENT ILLNESS  Rigo Jessica is a 78 y.o. female who presents today for reevaluation of left shoulder. Pain is a 1/10. At last OV on 3/07/2023, patient had a left shoulder cortisone injection which provided some relief. Her shoulder is feeling better after the injection. She has new c/o bilateral knee pain today. Reports swelling. No injuries. Pain is a 7/10. Patient denies any fever, chills, chest pain, shortness of breath or calf pain. The remainder of the review of systems is negative. There are no new illness or injuries to report since last seen in the office. No changes in medications, allergies, social or family history. PHYSICAL EXAM:   Ht 5' 6\" (1.676 m)   Wt 226 lb (102.5 kg)   BMI 36.48 kg/m²   The patient is a well-developed, well-nourished female   in no acute distress. The patient is alert and oriented times three. The patient is alert and oriented times three. Mood and affect are normal.  LYMPHATIC: lymph nodes are not enlarged and are within normal limits  SKIN: normal in color and non tender to palpation. There are no bruises or abrasions noted. NEUROLOGICAL: Motor sensory exam is within normal limits. Reflexes are equal bilaterally.  There is normal sensation to pinprick and light touch  MUSCULOSKELETAL:  Examination Left shoulder   Skin Intact   AC joint tenderness -   Biceps tenderness -   Forward flexion/Elevation    Active abduction    Glenohumeral abduction 90   External rotation ROM 45   Internal rotation ROM 30   Apprehension -   Aquiless Relocation -   Jerk -   Load and Shift -   Obriens -   Speeds -   Impingement sign +   Supraspinatus/Empty Can -, 5/5   External Rotation Strength -, 5/5   Lift Off/Belly Press -, 5/5   Neurovascular Intact          Examination Left knee Right knee   Skin Intact Intact   Range of motion

## 2023-05-15 ENCOUNTER — OFFICE VISIT (OUTPATIENT)
Age: 83
End: 2023-05-15

## 2023-05-15 DIAGNOSIS — M76.61 ACHILLES TENDINITIS, RIGHT LEG: Primary | ICD-10-CM

## 2023-05-15 NOTE — PROGRESS NOTES
AMBULATORY PROGRESS NOTE      Patient: Willem Santacruz             MRN: [id number]     SSN: [id number] There is no height or weight on file to calculate BMI. YOB: 1940     AGE: 78 y.o. EX: female    PCP: Namita Mojica DO       IMPRESSION //  DIAGNOSIS AND TREATMENT PLAN      Willem Santacruz has a diagnosis of:      DIAGNOSES    1. Achilles tendinitis, right leg          PLAN:    1. Activities as tolerated. 2. Recommended Tuli Heel Cup and/or AchilloTrain Ankle Brace     RTO 3 months    No orders of the defined types were placed in this encounter. Patient Instructions   It was nice seeing you today! As discussed, please have your daughter order a [employer] from [employer]. You may consider AchilloTrain Ankle Brace from [employer]. com/b2c/Products/Ankle-Braces/c/ankle-braces although these are pricier. For this ankle brace, you will need a size 5; nothing smaller than size 5 with your 9-9.5\" measurements. Please follow up with your PCP for any health maintenance as recommended. Willem Santacruz  expresses understanding of the diagnosis, treatment plan, and all of their proposed questions were answered to their satisfaction. Patient education has been provided re the diagnoses. HPI //  Kavya Mata IS A 78 y.o. female who is a/an  established patient, presenting to my outpatient office for evaluation of  the following chief complaint(s):     Chief Complaint   Patient presents with    Foot Pain     right       Patient states that she is doing well aside from not being able to wear shoes as she would like. She notes that her ankle often inverts when wearing certain shoes and she did not get a prescription for the figure 8 brace. She has a specialist that comes to her home to check her feet, shoes, and can likely make a brace. Additionally, patient is experiencing left calf pain which is new.  She is followed by

## 2023-05-15 NOTE — PATIENT INSTRUCTIONS
It was nice seeing you today! As discussed, please have your daughter order a XAVI [employer] from [employer]. You may consider AchilloTrain Ankle Brace from [email or web address] com/b2c/Products/Ankle-Braces/c/ankle-braces although these are pricier. For this ankle brace, you will need a size 5; nothing smaller than size 5 with your 9-9.5\" measurements.

## 2023-05-30 ENCOUNTER — TELEPHONE (OUTPATIENT)
Age: 83
End: 2023-05-30

## 2023-05-30 NOTE — TELEPHONE ENCOUNTER
Patient is requesting a call back patient states it had something do do with her getting a brace and a lift chair.         [phone]

## 2023-05-31 NOTE — TELEPHONE ENCOUNTER
Spoke with patient in regards to achilliotrain brace. Patient was instructed where to purchase brace. Will discuss with Dr. Trish Pearson in regards to lift chair.

## 2023-06-01 DIAGNOSIS — M76.61 ACHILLES TENDINITIS, RIGHT LEG: Primary | ICD-10-CM

## 2023-06-01 DIAGNOSIS — M75.102 TEAR OF LEFT ROTATOR CUFF, UNSPECIFIED TEAR EXTENT, UNSPECIFIED WHETHER TRAUMATIC: ICD-10-CM

## 2023-08-10 ENCOUNTER — OFFICE VISIT (OUTPATIENT)
Age: 83
End: 2023-08-10
Payer: MEDICARE

## 2023-08-10 VITALS
SYSTOLIC BLOOD PRESSURE: 130 MMHG | HEIGHT: 66 IN | RESPIRATION RATE: 18 BRPM | OXYGEN SATURATION: 95 % | WEIGHT: 234.2 LBS | TEMPERATURE: 97.9 F | DIASTOLIC BLOOD PRESSURE: 60 MMHG | BODY MASS INDEX: 37.64 KG/M2 | HEART RATE: 68 BPM

## 2023-08-10 DIAGNOSIS — M17.12 PRIMARY OSTEOARTHRITIS OF LEFT KNEE: ICD-10-CM

## 2023-08-10 DIAGNOSIS — I73.9 PVD (PERIPHERAL VASCULAR DISEASE) (HCC): ICD-10-CM

## 2023-08-10 DIAGNOSIS — M17.11 PRIMARY OSTEOARTHRITIS OF RIGHT KNEE: ICD-10-CM

## 2023-08-10 DIAGNOSIS — M47.816 LUMBAR FACET ARTHROPATHY: Primary | ICD-10-CM

## 2023-08-10 DIAGNOSIS — M75.102 TEAR OF LEFT ROTATOR CUFF, UNSPECIFIED TEAR EXTENT, UNSPECIFIED WHETHER TRAUMATIC: ICD-10-CM

## 2023-08-10 DIAGNOSIS — M79.7 FIBROMYALGIA: ICD-10-CM

## 2023-08-10 PROCEDURE — G8399 PT W/DXA RESULTS DOCUMENT: HCPCS | Performed by: PHYSICAL MEDICINE & REHABILITATION

## 2023-08-10 PROCEDURE — G8417 CALC BMI ABV UP PARAM F/U: HCPCS | Performed by: PHYSICAL MEDICINE & REHABILITATION

## 2023-08-10 PROCEDURE — G8427 DOCREV CUR MEDS BY ELIG CLIN: HCPCS | Performed by: PHYSICAL MEDICINE & REHABILITATION

## 2023-08-10 PROCEDURE — 1090F PRES/ABSN URINE INCON ASSESS: CPT | Performed by: PHYSICAL MEDICINE & REHABILITATION

## 2023-08-10 PROCEDURE — 1036F TOBACCO NON-USER: CPT | Performed by: PHYSICAL MEDICINE & REHABILITATION

## 2023-08-10 PROCEDURE — 1123F ACP DISCUSS/DSCN MKR DOCD: CPT | Performed by: PHYSICAL MEDICINE & REHABILITATION

## 2023-08-10 PROCEDURE — 99214 OFFICE O/P EST MOD 30 MIN: CPT | Performed by: PHYSICAL MEDICINE & REHABILITATION

## 2023-08-10 PROCEDURE — 3075F SYST BP GE 130 - 139MM HG: CPT | Performed by: PHYSICAL MEDICINE & REHABILITATION

## 2023-08-10 PROCEDURE — 3078F DIAST BP <80 MM HG: CPT | Performed by: PHYSICAL MEDICINE & REHABILITATION

## 2023-08-10 RX ORDER — ATORVASTATIN CALCIUM 10 MG/1
TABLET, FILM COATED ORAL
COMMUNITY
Start: 2023-07-28

## 2023-08-10 RX ORDER — PANTOPRAZOLE SODIUM 40 MG/1
TABLET, DELAYED RELEASE ORAL
COMMUNITY
Start: 2023-07-27

## 2023-08-10 RX ORDER — GABAPENTIN 100 MG/1
CAPSULE ORAL
Qty: 90 CAPSULE | Refills: 2 | Status: SHIPPED | OUTPATIENT
Start: 2023-08-10 | End: 2023-11-10

## 2023-08-11 ENCOUNTER — TELEPHONE (OUTPATIENT)
Age: 83
End: 2023-08-11

## 2023-08-11 NOTE — TELEPHONE ENCOUNTER
Patient was advised by her insurance that we need to pre-authorize the lift chair we ordered yesterday. She doesn't have a company in mind that can assist with filling this order and is asking for any help we can provide to get this done.

## 2023-08-11 NOTE — TELEPHONE ENCOUNTER
If there is paperwork that her insurance has to authorize the lift chair then we can look at it to see if we can help her out with this.

## 2023-08-18 NOTE — TELEPHONE ENCOUNTER
Called patient [phone] and verified her name and date of birth. I informed her that we have located a company for her lift chair and they deliver and it is called [facility]. I have fax the order and her information to them and we have received the confirmation. Fax number [phone] and phone number [phone]. Patient also inquired about the physical therapy because she had not heard anything yet. I informed her that the referral had been fax to [facility] and their number is [phone] and she can call them. Patient also stated she has heard about the tens unit and she should be getting that in the mail soon. No further action needed at this time.

## 2023-08-24 ENCOUNTER — TELEPHONE (OUTPATIENT)
Age: 83
End: 2023-08-24

## 2023-08-24 NOTE — TELEPHONE ENCOUNTER
Patient called for Elvi Iyer. Patient said that Elvi Iyer had placed an order for her to get a Tens Unit, and a Lift Chair. That Elvi Iyer also placed a referral for her to get Physical Therapy. Patient said that she got the Tens Unit, but had not heard from anyone in regards to the Lift Chair. Patient also said that she has not heard from [facility]. That she is going to call them to see if she could be scheduled for an appt. Patient tel. [phone]. Note : order for the Lift Chair is from 8/10/23 from Elvi Iyer.

## 2023-08-24 NOTE — TELEPHONE ENCOUNTER
Attempted to contact the pt regarding her message. She was not able to be reached. A message was left for the pt letting her know that below information. Gaby Schroeder LPN     HB     2:16 PM  Note     Called patient [phone] and verified her name and date of birth. I informed her that we have located a company for her lift chair and they deliver and it is called [facility]. I have fax the order and her information to them and we have received the confirmation. Fax number [phone] and phone number [phone]. Patient also inquired about the physical therapy because she had not heard anything yet. I informed her that the referral had been fax to [facility] and their number is [phone] and she can call them. Patient also stated she has heard about the tens unit and she should be getting that in the mail soon. No further action needed at this time. I left the phone numbers on the pt's voicemail and what orders were sent to which locations. I asked the pt to have the companies fax us any necessary paperwork needed for an authorization if necessary. I also provided our office fax number and phone number in case she has any questions or concerns.

## 2023-08-30 ENCOUNTER — OFFICE VISIT (OUTPATIENT)
Age: 83
End: 2023-08-30
Payer: MEDICARE

## 2023-08-30 DIAGNOSIS — M76.61 ACHILLES TENDINITIS OF RIGHT LOWER EXTREMITY: ICD-10-CM

## 2023-08-30 DIAGNOSIS — M76.821 POSTERIOR TIBIAL TENDINITIS OF RIGHT LOWER EXTREMITY: Primary | ICD-10-CM

## 2023-08-30 PROCEDURE — G8427 DOCREV CUR MEDS BY ELIG CLIN: HCPCS | Performed by: ORTHOPAEDIC SURGERY

## 2023-08-30 PROCEDURE — 1036F TOBACCO NON-USER: CPT | Performed by: ORTHOPAEDIC SURGERY

## 2023-08-30 PROCEDURE — G8399 PT W/DXA RESULTS DOCUMENT: HCPCS | Performed by: ORTHOPAEDIC SURGERY

## 2023-08-30 PROCEDURE — 1123F ACP DISCUSS/DSCN MKR DOCD: CPT | Performed by: ORTHOPAEDIC SURGERY

## 2023-08-30 PROCEDURE — 1090F PRES/ABSN URINE INCON ASSESS: CPT | Performed by: ORTHOPAEDIC SURGERY

## 2023-08-30 PROCEDURE — G8417 CALC BMI ABV UP PARAM F/U: HCPCS | Performed by: ORTHOPAEDIC SURGERY

## 2023-08-30 PROCEDURE — 99214 OFFICE O/P EST MOD 30 MIN: CPT | Performed by: ORTHOPAEDIC SURGERY

## 2023-08-30 NOTE — PROGRESS NOTES
for (Specify) as OP             Posterior tibial tendinitis of right lower extremity  Plan: DME Order for (Specify) as OP             - Length of Need: Lifetime    This item, is of medical necessity, in order to support and rebalance the ankle and hindfoot, thus improving overall gait mechanics, allowing for less pain, and improved time duration of walking standing. This will provide better medial lateral support, and left foot for improved pushoff during the gait cycle. This will raise the arch, improve the mechanics, and balance her posterior tibial tendon and peroneal brevis tendons as these are antagonistic tendons. Osman Cartagena MD  8/30/2023 3:14 PM    DME Order for (Specify) as OP     - CUSTOM DME device ordered -  Right MEDIAL POSTED SMO  - Diagnosis: Achilles tendinitis of right lower extremity  (primary encounter diagnosis)  Plan: DME Order for (Specify) as OP             Posterior tibial tendinitis of right lower extremity  Plan: DME Order for (Specify) as OP             - Length of Need: Lifetime    This item, is of medical necessity, in order to support the medial column medial hindfoot in this individual, who has progressive collapsing foot deformity/posterior tibial tendinitis/posterior tibial dysfunction. This will raise the arch, improve her mechanics, and balance her posterior tibial tendon and peroneal brevis tendons as these are antagonistic tendons. This custom orthotic will accommodate deformity and functional deficits, to improve the excessive pronation and pes planovalgus. This will also allow for multiplane control over 1 or more joint segments of the ankle, and hindfoot regions. Osman Cartagena MD  8/30/2023 3:14 PM            No new radiographs today. CHART REVIEW     Rekha Ansari has been experiencing pain and discomfort confirmed as outlined in the pain assessment outlined below.  was reviewed by Geo Bridges. MD Livia on 8/30/2023.      PAST MEDICAL

## 2023-09-19 ENCOUNTER — TELEPHONE (OUTPATIENT)
Age: 83
End: 2023-09-19

## 2023-09-19 NOTE — TELEPHONE ENCOUNTER
Boy Chinchilla with [employer] [facility] [employer] is wanting to know if Dr. Flory Harris would like him to use any special material on this brace since patient is a diabetic?

## 2023-09-21 NOTE — TELEPHONE ENCOUNTER
Spoke with Boy Chinchilla today and advised him of Dr. Flory Harris' recommendations. He is wonder if he can give her a \"deacon\" brace. She just bought two pairs of diabetic shoes and doesn't want to have to buy a 3rd pair. Boy Chinchilla states that a deacon brace would keep her from having to go up a size in shoes.

## 2023-09-25 DIAGNOSIS — M76.61 ACHILLES TENDINITIS OF RIGHT LOWER EXTREMITY: Primary | ICD-10-CM

## 2023-09-25 DIAGNOSIS — M76.821 POSTERIOR TIBIAL TENDINITIS OF RIGHT LOWER EXTREMITY: ICD-10-CM

## 2023-09-25 NOTE — PROGRESS NOTES
Nayely Rivera   [address] [place] [address]    Medications below, sent to the pharmacy      Orders Placed This Encounter    DME Order for (Specify) as GENIA     JAVIER CUSTOM DME device ordered -   AFO CUSTOM  - Diagnosis:    - Length of Need: Lifetime    This item, is of medical necessity, in order to support and rebalance the ankle and hindfoot, thus improving overall gait mechanics, allowing for less pain, and improved time duration of walking standing. This will provide better medial lateral support, and left foot for improved pushoff during the gait cycle. This will raise the arch, improve the mechanics, and balance her posterior tibial tendon and peroneal brevis tendons as these are antagonistic tendons.     Jonathan Lorenz MD  9/25/2023 9:26 AM           Litzy Lorenzo MD  9/25/2023  9:28 AM

## 2023-09-25 NOTE — TELEPHONE ENCOUNTER
Dr. Jenny Pizarro approved the [address] [place] [address] notified and new order faxed to him at [phone].

## 2023-11-16 ENCOUNTER — OFFICE VISIT (OUTPATIENT)
Age: 83
End: 2023-11-16

## 2023-11-16 VITALS — TEMPERATURE: 97.8 F | BODY MASS INDEX: 37.28 KG/M2 | WEIGHT: 232 LBS | HEIGHT: 66 IN

## 2023-11-16 DIAGNOSIS — M65.839 INTERSECTION SYNDROME: ICD-10-CM

## 2023-11-16 DIAGNOSIS — G89.29 CHRONIC RIGHT SHOULDER PAIN: ICD-10-CM

## 2023-11-16 DIAGNOSIS — M25.511 CHRONIC RIGHT SHOULDER PAIN: ICD-10-CM

## 2023-11-16 DIAGNOSIS — M25.531 RIGHT WRIST PAIN: ICD-10-CM

## 2023-11-16 DIAGNOSIS — M17.12 PRIMARY OSTEOARTHRITIS OF LEFT KNEE: ICD-10-CM

## 2023-11-16 DIAGNOSIS — M17.11 PRIMARY OSTEOARTHRITIS OF RIGHT KNEE: ICD-10-CM

## 2023-11-16 DIAGNOSIS — M25.562 PAIN IN BOTH KNEES, UNSPECIFIED CHRONICITY: ICD-10-CM

## 2023-11-16 DIAGNOSIS — M47.812 CERVICAL SPONDYLOSIS: ICD-10-CM

## 2023-11-16 DIAGNOSIS — M25.561 PAIN IN BOTH KNEES, UNSPECIFIED CHRONICITY: ICD-10-CM

## 2023-11-16 DIAGNOSIS — M75.102 TEAR OF LEFT ROTATOR CUFF, UNSPECIFIED TEAR EXTENT, UNSPECIFIED WHETHER TRAUMATIC: Primary | ICD-10-CM

## 2023-11-16 RX ORDER — TRIAMCINOLONE ACETONIDE 40 MG/ML
40 INJECTION, SUSPENSION INTRA-ARTICULAR; INTRAMUSCULAR ONCE
Status: COMPLETED | OUTPATIENT
Start: 2023-11-16 | End: 2023-11-16

## 2023-11-16 RX ADMIN — TRIAMCINOLONE ACETONIDE 40 MG: 40 INJECTION, SUSPENSION INTRA-ARTICULAR; INTRAMUSCULAR at 10:47

## 2023-11-27 NOTE — PROGRESS NOTES
[facility]    [address] [place] [address] [address]  Phone: [phone]  Fax: [phone]        Richie Rey  : 1940  PCP: Anali Jarrett, DO    PROGRESS NOTE      ASSESSMENT AND PLAN    Lars Clemente was seen today for lower back pain. Diagnoses and all orders for this visit:    Cervical spondylosis  -     AMB POC XRAY, SPINE, CERVICAL; 2 OR 3  -     Amb External Referral To Physical Therapy    Tear of left rotator cuff, unspecified tear extent, unspecified whether traumatic  -     Amb External Referral To Physical Therapy    Lumbar facet arthropathy  -     Amb External Referral To Physical Therapy    Fibromyalgia  -     Amb External Referral To Physical Therapy        Ann Hampton is a 78 y.o. female presenting with neck pain radiating into her bilateral shoulders and chronic low back pain. She wants to avoid medication. Referral for physical therapy. Trigger point injection left upper trapezius if not improving. OTC APAP. Has Gabapentin at home, wants to avoid. Follow-up and Dispositions    Return in about 8 weeks (around 2024). HISTORY OF PRESENT ILLNESS      Ann Hampton is a 78 y.o. female presents with neck pain and for a follow up of diffuse lumbar pain. LV 2023 Trial of very low-dose ramp of Gabapentin, referred to PT, given DME prescription for a lift chair, and given contact information for [email or web address] for her E stimulation unit. She saw Dr. Jan Aguirre two weeks ago, and she received a cortisone injection in her right forearm. She states that Dr. Jan Aguirre attributed her left wrist pain was due to her neck. No history of cervical imaging. Patient presents today with a single point cane and with neck and low back pain. She denies numbness, tingling, and weakness in her arms, but she complains of bilateral shoulder pain.   History of rotator cuff disease, symptoms improved post steroid injection by

## 2023-11-29 ENCOUNTER — OFFICE VISIT (OUTPATIENT)
Age: 83
End: 2023-11-29
Payer: MEDICARE

## 2023-11-29 VITALS
BODY MASS INDEX: 36.48 KG/M2 | HEART RATE: 68 BPM | TEMPERATURE: 97 F | OXYGEN SATURATION: 96 % | HEIGHT: 66 IN | WEIGHT: 227 LBS

## 2023-11-29 DIAGNOSIS — M75.102 TEAR OF LEFT ROTATOR CUFF, UNSPECIFIED TEAR EXTENT, UNSPECIFIED WHETHER TRAUMATIC: ICD-10-CM

## 2023-11-29 DIAGNOSIS — M47.816 LUMBAR FACET ARTHROPATHY: ICD-10-CM

## 2023-11-29 DIAGNOSIS — M47.812 CERVICAL SPONDYLOSIS: Primary | ICD-10-CM

## 2023-11-29 DIAGNOSIS — M79.7 FIBROMYALGIA: ICD-10-CM

## 2023-11-29 PROCEDURE — G8428 CUR MEDS NOT DOCUMENT: HCPCS | Performed by: PHYSICAL MEDICINE & REHABILITATION

## 2023-11-29 PROCEDURE — 1090F PRES/ABSN URINE INCON ASSESS: CPT | Performed by: PHYSICAL MEDICINE & REHABILITATION

## 2023-11-29 PROCEDURE — G8399 PT W/DXA RESULTS DOCUMENT: HCPCS | Performed by: PHYSICAL MEDICINE & REHABILITATION

## 2023-11-29 PROCEDURE — 1036F TOBACCO NON-USER: CPT | Performed by: PHYSICAL MEDICINE & REHABILITATION

## 2023-11-29 PROCEDURE — G8484 FLU IMMUNIZE NO ADMIN: HCPCS | Performed by: PHYSICAL MEDICINE & REHABILITATION

## 2023-11-29 PROCEDURE — 1123F ACP DISCUSS/DSCN MKR DOCD: CPT | Performed by: PHYSICAL MEDICINE & REHABILITATION

## 2023-11-29 PROCEDURE — 72040 X-RAY EXAM NECK SPINE 2-3 VW: CPT | Performed by: PHYSICAL MEDICINE & REHABILITATION

## 2023-11-29 PROCEDURE — G8417 CALC BMI ABV UP PARAM F/U: HCPCS | Performed by: PHYSICAL MEDICINE & REHABILITATION

## 2023-11-29 PROCEDURE — 99213 OFFICE O/P EST LOW 20 MIN: CPT | Performed by: PHYSICAL MEDICINE & REHABILITATION

## 2023-11-29 RX ORDER — POTASSIUM CHLORIDE 1500 MG/1
TABLET, EXTENDED RELEASE ORAL
COMMUNITY
Start: 2023-08-29

## 2023-11-29 RX ORDER — METHYLPREDNISOLONE 4 MG/1
TABLET ORAL
COMMUNITY
Start: 2023-11-22

## 2023-11-29 RX ORDER — MECLIZINE HYDROCHLORIDE 25 MG/1
TABLET ORAL
COMMUNITY
Start: 2023-11-27

## 2023-11-29 RX ORDER — AZITHROMYCIN 500 MG/1
TABLET, FILM COATED ORAL
COMMUNITY
Start: 2023-11-22

## 2023-11-29 NOTE — PROGRESS NOTES
Jade العلي presents today for   Chief Complaint   Patient presents with    Lower Back Pain       Is someone accompanying this pt? no    Is the patient using any DME equipment during OV? Yes, cane    Coordination of Care:  1. Have you been to the ER, urgent care clinic since your last visit? Yes, sinus infection  Hospitalized since your last visit? no    2. Have you seen or consulted any other health care providers outside of the [address] [place] [address] Avenue since your last visit? Yes, multiple Include any pap smears or colon screening.  no

## 2023-12-04 ENCOUNTER — OFFICE VISIT (OUTPATIENT)
Age: 83
End: 2023-12-04
Payer: MEDICARE

## 2023-12-04 ENCOUNTER — TELEPHONE (OUTPATIENT)
Age: 83
End: 2023-12-04

## 2023-12-04 VITALS — WEIGHT: 227 LBS | BODY MASS INDEX: 36.48 KG/M2 | HEIGHT: 66 IN

## 2023-12-04 DIAGNOSIS — M75.102 TEAR OF LEFT ROTATOR CUFF, UNSPECIFIED TEAR EXTENT, UNSPECIFIED WHETHER TRAUMATIC: Primary | ICD-10-CM

## 2023-12-04 DIAGNOSIS — M65.839 INTERSECTION SYNDROME: ICD-10-CM

## 2023-12-04 DIAGNOSIS — M17.12 PRIMARY OSTEOARTHRITIS OF LEFT KNEE: ICD-10-CM

## 2023-12-04 DIAGNOSIS — M17.11 PRIMARY OSTEOARTHRITIS OF RIGHT KNEE: ICD-10-CM

## 2023-12-04 DIAGNOSIS — M25.562 PAIN IN BOTH KNEES, UNSPECIFIED CHRONICITY: ICD-10-CM

## 2023-12-04 DIAGNOSIS — G89.29 CHRONIC RIGHT SHOULDER PAIN: ICD-10-CM

## 2023-12-04 DIAGNOSIS — M25.511 CHRONIC RIGHT SHOULDER PAIN: ICD-10-CM

## 2023-12-04 DIAGNOSIS — M25.561 PAIN IN BOTH KNEES, UNSPECIFIED CHRONICITY: ICD-10-CM

## 2023-12-04 DIAGNOSIS — M25.531 RIGHT WRIST PAIN: ICD-10-CM

## 2023-12-04 DIAGNOSIS — M47.812 CERVICAL SPONDYLOSIS: ICD-10-CM

## 2023-12-04 PROCEDURE — 1090F PRES/ABSN URINE INCON ASSESS: CPT | Performed by: ORTHOPAEDIC SURGERY

## 2023-12-04 PROCEDURE — G8399 PT W/DXA RESULTS DOCUMENT: HCPCS | Performed by: ORTHOPAEDIC SURGERY

## 2023-12-04 PROCEDURE — G8484 FLU IMMUNIZE NO ADMIN: HCPCS | Performed by: ORTHOPAEDIC SURGERY

## 2023-12-04 PROCEDURE — G8427 DOCREV CUR MEDS BY ELIG CLIN: HCPCS | Performed by: ORTHOPAEDIC SURGERY

## 2023-12-04 PROCEDURE — 1123F ACP DISCUSS/DSCN MKR DOCD: CPT | Performed by: ORTHOPAEDIC SURGERY

## 2023-12-04 PROCEDURE — 20611 DRAIN/INJ JOINT/BURSA W/US: CPT | Performed by: ORTHOPAEDIC SURGERY

## 2023-12-04 PROCEDURE — 99214 OFFICE O/P EST MOD 30 MIN: CPT | Performed by: ORTHOPAEDIC SURGERY

## 2023-12-04 PROCEDURE — G8417 CALC BMI ABV UP PARAM F/U: HCPCS | Performed by: ORTHOPAEDIC SURGERY

## 2023-12-04 PROCEDURE — 1036F TOBACCO NON-USER: CPT | Performed by: ORTHOPAEDIC SURGERY

## 2023-12-04 RX ORDER — TRIAMCINOLONE ACETONIDE 40 MG/ML
40 INJECTION, SUSPENSION INTRA-ARTICULAR; INTRAMUSCULAR ONCE
Status: COMPLETED | OUTPATIENT
Start: 2023-12-04 | End: 2023-12-04

## 2023-12-04 RX ADMIN — TRIAMCINOLONE ACETONIDE 40 MG: 40 INJECTION, SUSPENSION INTRA-ARTICULAR; INTRAMUSCULAR at 16:59

## 2023-12-04 NOTE — TELEPHONE ENCOUNTER
Patient is requesting a call back states, she has acid reflux and would like to know if the medication in her injection today going to affect her acid reflux.       Please nelsy and advise patient at   [phone]

## 2023-12-04 NOTE — TELEPHONE ENCOUNTER
Spoke with patient and relayed message. Patient verbalized understanding and gratitude for the call back.

## 2023-12-27 ENCOUNTER — TELEPHONE (OUTPATIENT)
Age: 83
End: 2023-12-27

## 2023-12-27 NOTE — TELEPHONE ENCOUNTER
I attempted to contact Khoi regarding her request. She was not able to be reached. A message was left for her letting her know that Dr. Glen Cook has been out of the office since 12/15/23 and will not return until 01/03/24. I provided 2 office fax numbers for her to send the document to so that we can provide this to her when she returns. I left the office number in case there were any questions or concerns.

## 2023-12-27 NOTE — TELEPHONE ENCOUNTER
Khoi from [facility] PT office called and has requested a signed plan of care for Dr. Hermon Moritz  patient Carrie Emerson. She stated she has sent the document by fax numerous times.      Carrie Emerson   #[id number]    She requests a call back if the office did not receive the document    She can be reached at [phone]  Fax the document to [phone]

## 2024-02-06 ENCOUNTER — TELEPHONE (OUTPATIENT)
Age: 84
End: 2024-02-06

## 2024-02-06 NOTE — TELEPHONE ENCOUNTER
Patient called and canceled her appt for today because she is still waiting for the brace.    She states that she's been waiting for about 2 months for a peer to peer to be done with [facility] and she really needs the brace.      Please contact the patient with an update on the status of her brace.    Please contact Robert Barajas at [facility].    Ph: [phone]  Fax: [phone]

## 2024-02-19 NOTE — TELEPHONE ENCOUNTER
Patient is requesting an update about the peer to peer to her insurance to get approval for her brace, the insurance company wants to know why Dr. Bhakta order that type of brace.    Patient tel: [phone]

## 2024-02-29 ENCOUNTER — OFFICE VISIT (OUTPATIENT)
Age: 84
End: 2024-02-29

## 2024-02-29 VITALS — BODY MASS INDEX: 36.48 KG/M2 | HEIGHT: 66 IN | WEIGHT: 227 LBS

## 2024-02-29 DIAGNOSIS — M25.562 CHRONIC PAIN OF BOTH KNEES: Primary | ICD-10-CM

## 2024-02-29 DIAGNOSIS — M25.561 CHRONIC PAIN OF BOTH KNEES: Primary | ICD-10-CM

## 2024-02-29 DIAGNOSIS — M17.11 PATELLOFEMORAL ARTHRITIS OF RIGHT KNEE: ICD-10-CM

## 2024-02-29 DIAGNOSIS — M17.12 PATELLOFEMORAL ARTHRITIS OF LEFT KNEE: ICD-10-CM

## 2024-02-29 DIAGNOSIS — G89.29 CHRONIC PAIN OF BOTH KNEES: Primary | ICD-10-CM

## 2024-02-29 RX ORDER — TRIAMCINOLONE ACETONIDE 40 MG/ML
40 INJECTION, SUSPENSION INTRA-ARTICULAR; INTRAMUSCULAR ONCE
Status: COMPLETED | OUTPATIENT
Start: 2024-02-29 | End: 2024-02-29

## 2024-02-29 RX ORDER — LIDOCAINE HYDROCHLORIDE 10 MG/ML
4 INJECTION, SOLUTION INFILTRATION; PERINEURAL ONCE
Status: COMPLETED | OUTPATIENT
Start: 2024-02-29 | End: 2024-02-29

## 2024-02-29 RX ADMIN — LIDOCAINE HYDROCHLORIDE 4 ML: 10 INJECTION, SOLUTION INFILTRATION; PERINEURAL at 11:52

## 2024-02-29 RX ADMIN — TRIAMCINOLONE ACETONIDE 40 MG: 40 INJECTION, SUSPENSION INTRA-ARTICULAR; INTRAMUSCULAR at 11:52

## 2024-02-29 NOTE — PROGRESS NOTES
Holley Rolle  1940   Chief Complaint   Patient presents with    Knee Pain     Bilateral knee pain        HISTORY OF PRESENT ILLNESS  Holley Rolle is a 83 y.o. female who presents today for reevaluation of bilateral knee pain (R>L). Pain is a 3/10. The patient was last seen in office by me on 12/04/2023 for reevaluation of left shoulder pain. Pt notes swelling when she is sitting without elevating her legs. She notes when she elevates her legs, her knees lock when attempts to bring her legs down.     Patient denies any fever, chills, chest pain, shortness of breath or calf pain. The remainder of the review of systems is negative. There are no new illness or injuries to report since last seen in the office. No changes in medications, allergies, social or family history.      PHYSICAL EXAM:   Ht 1.676 m (5' 6\")   Wt 103 kg (227 lb)   BMI 36.64 kg/m²   The patient is a well-developed, well-nourished female   in no acute distress.  The patient is alert and oriented times three.  The patient is alert and oriented times three. Mood and affect are normal.  LYMPHATIC: lymph nodes are not enlarged and are within normal limits  SKIN: normal in color and non tender to palpation. There are no bruises or abrasions noted.   NEUROLOGICAL: Motor sensory exam is within normal limits. Reflexes are equal bilaterally. There is normal sensation to pinprick and light touch  MUSCULOSKELETAL:  Examination Left knee Right knee   Skin Intact Intact   Range of motion     Effusion + +   Medial joint line tenderness + +   Lateral joint line tenderness + +   Tenderness Pes Bursa - -   Tenderness insertion MCL - -   Tenderness insertion LCL - -   Marky’s - -   Patella crepitus + +   Patella grind + +   Lachman - -   Pivot shift - -   Anterior drawer - -   Posterior drawer - -   Varus stress - -   Valgus stress - -   Neurovascular Intact Intact   Calf Swelling and Tenderness to Palpation - -   Raymond's Test - -

## 2024-03-15 NOTE — TELEPHONE ENCOUNTER
Juan Chung is a 59 year old male patient.  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)   • Coronary artery disease involving native coronary artery of native heart with unstable angina pectoris (CMS/HCC)   • Ischemic cardiomyopathy   • Essential hypertension, benign   • Hyperkalemia     Past Medical History:   Diagnosis Date   • Alcohol dependence (CMS/HCC)    • Anemia    • Anxiety    • Atrial fibrillation (CMS/HCC)    • Congestive cardiac failure (CMS/HCC)    • Coronary artery disease    • Essential (primary) hypertension    • Tobacco dependency      Current Facility-Administered Medications   Medication Dose Route Frequency Provider Last Rate Last Admin   • potassium CHLORIDE (KLOR-CON M) manisha ER tablet 40 mEq  40 mEq Oral Once Gretchen Cruz MD       • metoPROLOL tartrate (LOPRESSOR) tablet 25 mg  25 mg Oral 2 times per day Juan ACOSTA MD   25 mg at 10/06/21 1037   • magnesium sulfate 2 g in 50 mL premix IVPB  2 g Intravenous Once Naima Kwok CNP 25 mL/hr at 10/06/21 1141 2 g at 10/06/21 1141   • insulin regular (human) (HumuLIN R) 100 units in sodium chloride 0.9% 100 mL infusion  0-15 Units/hr Intravenous Continuous Kimmy Rm CNP       • insulin lispro (ADMELOG, HumaLOG) injection 2-14 Units  2-14 Units Subcutaneous TID PC Kimmy Rm CNP       • dextrose 50 % injection 25 g  25 g Intravenous PRN Kimmy Rm CNP       • acetaminophen (TYLENOL) tablet 650 mg  650 mg Oral Q4H PRN Kimmy Rm CNP   650 mg at 10/05/21 2029   • HYDROcodone-acetaminophen (NORCO) 5-325 MG per tablet 1 tablet  1 tablet Oral Q4H PRN Kimmy Rm CNP   1 tablet at 10/06/21 0525   • ondansetron (ZOFRAN ODT) disintegrating tablet 4 mg  4 mg Oral Q12H PRN Kimmy Rm CNP        Or   • ondansetron  Spoke with [employer] and reinitiated an auth request for the custom AFO brace.  Approved per \"Pati\" with [employer] - valid 03/15/24-04/14/25 - auth #[id number].  Advised Olivia with [employer] as well as notifying patient.   (ZOFRAN) injection 4 mg  4 mg Intravenous Q12H PRN Kimmy Rm CNP       • aspirin (ECOTRIN) EC tablet 325 mg  325 mg Oral Daily Kimmy Rm CNP   325 mg at 10/06/21 0755   • mupirocin (BACTROBAN) 2 % ointment 1 application  1 application Each Nare 2 times per day Kimmy Rm CNP   1 application at 10/06/21 0755   • chlorhexidine gluconate (PERIDEX) 0.12 % solution 15 mL  15 mL Swish & Spit 2 times per day Kimmy Rm CNP   15 mL at 10/06/21 0755   • aluminum-magnesium hydroxide-simethicone (MAALOX) 200-200-20 MG/5ML suspension 30 mL  30 mL Oral Q4H PRN Kimmy Rm CNP       • polyethylene glycol (MIRALAX) packet 17 g  17 g Oral Daily PRN Kimmy Rm CNP       • docusate sodium-sennosides (SENOKOT S) 50-8.6 MG 2 tablet  2 tablet Oral BID Kimmy Rm CNP   2 tablet at 10/06/21 0754   • bisacodyl (DULCOLAX) suppository 10 mg  10 mg Rectal Daily PRN Kimmy Rm CNP       • magnesium hydroxide (MILK OF MAGNESIA) 400 MG/5ML suspension 30 mL  30 mL Oral Daily PRN Kimmy mR CNP       • sodium chloride 0.9 % flush bag 25 mL  25 mL Intravenous PRN Kimmy Rm CNP       • sodium chloride (PF) 0.9 % injection 2 mL  2 mL Intracatheter 2 times per day Kimmy Rm CNP   2 mL at 10/06/21 0755   • sodium chloride 0.9% infusion   Intravenous Continuous PRN Naima Kwok CNP 50 mL/hr at 10/05/21 1010 Rate Verify at 10/05/21 1010   • heparin (porcine) injection 5,000 Units  5,000 Units Subcutaneous 2 times per day Kimmy Rm CNP   5,000 Units at 10/06/21 0756   • [START ON 10/7/2021] insulin lispro (ADMELOG, HumaLOG) injection 0-9 Units  0-9 Units Subcutaneous TID AC Kimmy Rm CNP       • Potassium Standard Replacement Protocol   Does not apply See Admin Instructions Kimmy L Rm, CNP       • Magnesium Standard Replacement Protocol   Does not apply See Admin Instructions Kimmy Rm, MIKA       •  traMADol (ULTRAM) tablet 50 mg  50 mg Oral Q6H PRN Kimmy SANTIAGO Jag, CNP   50 mg at 10/05/21 1614   • atorvastatin (LIPITOR) tablet 40 mg  40 mg Oral Nightly Naima Stacy Kwok, CNP   40 mg at 10/05/21 1958   • escitalopram (LEXAPRO) tablet 10 mg  10 mg Oral Daily Melanie Frazier MD   10 mg at 10/06/21 0755   • gabapentin (NEURONTIN) capsule 300 mg  300 mg Oral 3 times per day Renetta Hernández CNP   300 mg at 10/06/21 0526   • nicotine (NICODERM) 21 MG/24HR patch 1 patch  1 patch Transdermal Daily Renetta Hernández CNP   1 patch at 10/06/21 0754     ALLERGIES:  No Known Allergies  Principal Problem:    Acute on chronic combined systolic and diastolic congestive heart failure (CMS/HCC)  Active Problems:    Paroxysmal atrial fibrillation (CMS/HCC)    Coronary artery disease involving native coronary artery of native heart with unstable angina pectoris (CMS/HCC)    Ischemic cardiomyopathy    Essential hypertension, benign    Blood pressure 128/69, pulse 76, temperature 98.2 °F (36.8 °C), temperature source Temporal, resp. rate 14, height 6' (1.829 m), weight 77.2 kg (170 lb 3.1 oz), SpO2 100 %.    Subjective:  Symptoms:  Stable.  (Doing well, sitting up in a chair.  POD #2 S/P CABG.  Hemodynamically stable, no events overnight.  Also seen earlier this am by Dr. Lora.  ).    Diet:  Adequate intake.    Activity level: Normal with assistance.    Pain:  He complains of pain that is mild.      Objective:  General Appearance:  Comfortable and in no acute distress.    Vital signs: (most recent): Blood pressure 128/69, pulse 76, temperature 98.2 °F (36.8 °C), temperature source Temporal, resp. rate 14, height 6' (1.829 m), weight 77.2 kg (170 lb 3.1 oz), SpO2 100 %.  Vital signs are normal.    Output: Producing urine.  Stool output assessment: passing flatus.    Lungs:  Normal effort and normal respiratory rate.  (On NC)  Heart: Normal rate.  Regular rhythm.    Abdomen: Abdomen is soft.  There is no abdominal tenderness.      Neurological: Patient is alert and oriented to person, place and time.    Skin:  Warm and dry.      Assessment:    Condition: In serious condition.  Improving.       Plan:   Encourage ambulation.  Start/continue incentive spirometry.  Consults: cardiology.  Diet Plan: cardiac.  Chest x-ray.  (Pt also seen earlier this am by Dr. Lora- plan to continue to advance activity and continue aggressive coughing & deep breathing exercises.  MT's to water seal (200ml/last 24hrs), plan to remove MT's today.  Will give 1 dose of IV lasix today. Anticipate discharge in the next 48-72hrs.   ).       Kimmy Rm, CNP

## 2024-03-29 ENCOUNTER — TELEPHONE (OUTPATIENT)
Age: 84
End: 2024-03-29

## 2024-03-29 NOTE — TELEPHONE ENCOUNTER
Patient is scheduled for 4/24/24 for Dr. Bhakta to look at her  right ankle because she almost fell down three times yesterday and once today.    Patient is requesting a sooner appt than 4/24 sophie.    Patient tel [phone]

## 2024-04-03 NOTE — PROGRESS NOTES
Vaping Use: Never used   Substance and Sexual Activity    Alcohol use: No    Drug use: No    Sexual activity: Not Currently       CURRENT MEDICATIONS:   Current Outpatient Medications   Medication Sig    azithromycin (ZITHROMAX) 500 MG tablet     meclizine (ANTIVERT) 25 MG tablet     methylPREDNISolone (MEDROL DOSEPACK) 4 MG tablet     potassium chloride (KLOR-CON M) 20 MEQ TBCR extended release tablet     pantoprazole (PROTONIX) 40 MG tablet     gabapentin (NEURONTIN) 100 MG capsule Start 1 tablet p.o. nightly x 1 week.  Increase to 1 tablet p.o. twice daily x 1 week.  Then increase to 1 tablet every morning and 2 tablets nightly thereafter (Patient not taking: Reported on 10/16/2023)    ACCU-CHEK BIBIANA PLUS strip     ketoconazole (NIZORAL) 2 % shampoo     Accu-Chek Softclix Lancets MISC E clarify this medication. Outside name: ACCU-CHEK SOFTCLIX LANCETS misc    acetaminophen (TYLENOL) 500 MG tablet Take by mouth every 6 hours as needed    amLODIPine (NORVASC) 10 MG tablet Take 1 tablet by mouth daily    dicyclomine (BENTYL) 10 MG capsule Take 1 capsule by mouth daily as needed    doxazosin (CARDURA) 4 MG tablet Take 1 tablet by mouth    hydroCHLOROthiazide (HYDRODIURIL) 25 MG tablet Take 1 tablet by mouth daily    metoprolol succinate (TOPROL XL) 200 MG extended release tablet Take 1 tablet by mouth daily    potassium chloride (KLOR-CON M) 20 MEQ extended release tablet Take 1 tablet by mouth daily (Patient not taking: Reported on 11/29/2023)    traMADol (ULTRAM) 50 MG tablet Take 1 tablet by mouth daily as needed.     No current facility-administered medications for this visit.        DIAGNOSTIC LAB DATA      XR CHEST STANDARD TWO VW 12/28/2022    Narrative  EXAM: CHEST PA AND LATERAL    CLINICAL INDICATION/HISTORY: CHEST PAIN  > Additional: None.    COMPARISON: 05/19/2019    TECHNIQUE: PA and lateral views of the chest    _______________    FINDINGS:    HEART AND MEDIASTINUM: Cardiac silhouette is normal in

## 2024-04-04 ENCOUNTER — OFFICE VISIT (OUTPATIENT)
Age: 84
End: 2024-04-04

## 2024-04-04 DIAGNOSIS — M21.42 PES PLANUS OF BOTH FEET: ICD-10-CM

## 2024-04-04 DIAGNOSIS — G89.29 CHRONIC PAIN OF RIGHT ANKLE: ICD-10-CM

## 2024-04-04 DIAGNOSIS — M19.071 PRIMARY OSTEOARTHRITIS OF BOTH FEET: Primary | ICD-10-CM

## 2024-04-04 DIAGNOSIS — M21.41 PES PLANUS OF BOTH FEET: ICD-10-CM

## 2024-04-04 DIAGNOSIS — M19.072 PRIMARY OSTEOARTHRITIS OF BOTH FEET: Primary | ICD-10-CM

## 2024-04-04 DIAGNOSIS — M25.571 CHRONIC PAIN OF RIGHT ANKLE: ICD-10-CM

## 2024-04-04 NOTE — PATIENT INSTRUCTIONS
WRAPUP INSTRUCTIONS FOR OJSE TEAM         [x] Follow-up with Tj Bhakta MD   in 6 weeks.

## 2024-05-30 ENCOUNTER — TELEPHONE (OUTPATIENT)
Age: 84
End: 2024-05-30

## 2024-05-30 DIAGNOSIS — M17.11 PATELLOFEMORAL ARTHRITIS OF RIGHT KNEE: ICD-10-CM

## 2024-05-30 DIAGNOSIS — M17.12 PATELLOFEMORAL ARTHRITIS OF LEFT KNEE: Primary | ICD-10-CM

## 2024-05-30 DIAGNOSIS — M25.562 CHRONIC PAIN OF BOTH KNEES: ICD-10-CM

## 2024-05-30 DIAGNOSIS — G89.29 CHRONIC PAIN OF BOTH KNEES: ICD-10-CM

## 2024-05-30 DIAGNOSIS — M25.561 CHRONIC PAIN OF BOTH KNEES: ICD-10-CM

## 2024-05-30 NOTE — TELEPHONE ENCOUNTER
Patient is requesting a referral to have physical therapy @ [facility] in [address]

## 2024-06-19 ENCOUNTER — TELEPHONE (OUTPATIENT)
Age: 84
End: 2024-06-19

## 2024-06-19 DIAGNOSIS — M19.072 PRIMARY OSTEOARTHRITIS OF BOTH FEET: Primary | ICD-10-CM

## 2024-06-19 DIAGNOSIS — M21.42 PES PLANUS OF BOTH FEET: ICD-10-CM

## 2024-06-19 DIAGNOSIS — M19.071 PRIMARY OSTEOARTHRITIS OF BOTH FEET: Primary | ICD-10-CM

## 2024-06-19 DIAGNOSIS — M21.41 PES PLANUS OF BOTH FEET: ICD-10-CM

## 2024-06-19 NOTE — TELEPHONE ENCOUNTER
Patient states that she received the brace for her right ankle that Dr. Bhakta ordered, but the shoes that was ordered to fit the brace, she have not received the shoes and its been almost two months since the order has been placed.     When she call to [facility], she left a message but she has not heard back from them and on the answering machine it ask for you not to leave multiple messages.    Patient wants to know if someone can please help her figure out what is taking so long to receive these shoes for her, or if the shoes were even ordered.     Patient is requesting a call back   Patient tel [phone]

## 2024-06-19 NOTE — TELEPHONE ENCOUNTER
I called and spoke to the pt. The pt was identified using 2 pt identifiers. I asked the pt what she is doing at physical therapy right now. She states that she is doing physical therapy for her knee and her vertigo. I explained to the pt that she will need to have an appt with Dr. Frey in order to be evaluated for physical therapy. She has not been seen since November 2023. I did let the pt know that usually physical therapy will only focus on 1-2 areas at a time. She verbalized understanding and states that she will wait until her August appt. I let the pt know that we can always move her appt to a sooner date if she feels like she needs to be seen sooner. No questions voiced from the pt at this time.

## 2024-07-02 ENCOUNTER — TELEPHONE (OUTPATIENT)
Age: 84
End: 2024-07-02

## 2024-07-02 NOTE — TELEPHONE ENCOUNTER
Patient is requesting a refill on       gabapentin (NEURONTIN) 100 MG capsule         [facility]   [address] [place], [place] [id number]  Phone: [phone]  Fax: [phone]

## 2024-07-10 DIAGNOSIS — M47.816 LUMBAR FACET ARTHROPATHY: ICD-10-CM

## 2024-07-14 RX ORDER — GABAPENTIN 100 MG/1
CAPSULE ORAL
Qty: 90 CAPSULE | OUTPATIENT
Start: 2024-07-14

## 2024-07-30 NOTE — PROGRESS NOTES
[facility]    [facility] [facility], [address]  [place] [address]  Phone: [phone]  Fax: [phone]        Holley Rolle  : 1940  PCP: Aguila Adame DO    PROGRESS NOTE      ASSESSMENT AND PLAN    Holley was seen today for back pain.    Diagnoses and all orders for this visit:    Cervical spondylosis  -     gabapentin (NEURONTIN) 100 MG capsule; One po bid prn pain.  -     Amb External Referral To Physical Therapy    Lumbar facet arthropathy  -     gabapentin (NEURONTIN) 100 MG capsule; One po bid prn pain.  -     Amb External Referral To Physical Therapy        Holley Rolle is a 83 y.o. female with cervicalgia, intermittent upper extremity paresthesias, chronic low back pain.   Start Gabapentin 100mg BID as needed.  Cautioned about sedation as well as possible lower extremity edema.  Referral to PT for cervical spondylosis and lumbar facet arthropathy. Pt provided with referral and phone number.  Patient prefers to go to a [facility] facility.  I have given her paper referral    Follow-up and Dispositions    Return in about 6 months (around 2025) for if we are RX Gabapentin.           HISTORY OF PRESENT ILLNESS    Holley Rolle is a 83 y.o. female presents for follow up of neck and lumbar pain. At her last OV (23), Referral to PT. Consider TPI for left upper trapezius. Pt called 24 requesting referral for PT sent to [facility]. Was advised she would have to be reevaluated prior to referral.    Pt currently attending PT for bilateral knee pain with benefit. Pt is now able to climb stairs with less difficulty.     Pt continues with HEP at home with mild benefit to low back pain. Pt continues with intermittent numbness/tingling into both legs.  Has neck pain with intermittent paresthesias in her upper extremities.  Pt notes she never started taking Gabapentin as she has concerns for memory issues associated with the medication.    Pt notes she no longer

## 2024-08-05 ENCOUNTER — OFFICE VISIT (OUTPATIENT)
Age: 84
End: 2024-08-05
Payer: MEDICARE

## 2024-08-05 VITALS
HEART RATE: 66 BPM | SYSTOLIC BLOOD PRESSURE: 134 MMHG | OXYGEN SATURATION: 97 % | DIASTOLIC BLOOD PRESSURE: 65 MMHG | WEIGHT: 226.85 LBS | HEIGHT: 66 IN | TEMPERATURE: 97.4 F | BODY MASS INDEX: 36.46 KG/M2

## 2024-08-05 DIAGNOSIS — M47.816 LUMBAR FACET ARTHROPATHY: ICD-10-CM

## 2024-08-05 DIAGNOSIS — M47.812 CERVICAL SPONDYLOSIS: Primary | ICD-10-CM

## 2024-08-05 PROCEDURE — 99214 OFFICE O/P EST MOD 30 MIN: CPT | Performed by: PHYSICAL MEDICINE & REHABILITATION

## 2024-08-05 PROCEDURE — G8428 CUR MEDS NOT DOCUMENT: HCPCS | Performed by: PHYSICAL MEDICINE & REHABILITATION

## 2024-08-05 PROCEDURE — G8399 PT W/DXA RESULTS DOCUMENT: HCPCS | Performed by: PHYSICAL MEDICINE & REHABILITATION

## 2024-08-05 PROCEDURE — G8417 CALC BMI ABV UP PARAM F/U: HCPCS | Performed by: PHYSICAL MEDICINE & REHABILITATION

## 2024-08-05 PROCEDURE — 3075F SYST BP GE 130 - 139MM HG: CPT | Performed by: PHYSICAL MEDICINE & REHABILITATION

## 2024-08-05 PROCEDURE — 1123F ACP DISCUSS/DSCN MKR DOCD: CPT | Performed by: PHYSICAL MEDICINE & REHABILITATION

## 2024-08-05 PROCEDURE — 3078F DIAST BP <80 MM HG: CPT | Performed by: PHYSICAL MEDICINE & REHABILITATION

## 2024-08-05 PROCEDURE — 1036F TOBACCO NON-USER: CPT | Performed by: PHYSICAL MEDICINE & REHABILITATION

## 2024-08-05 PROCEDURE — 1090F PRES/ABSN URINE INCON ASSESS: CPT | Performed by: PHYSICAL MEDICINE & REHABILITATION

## 2024-08-05 RX ORDER — LATANOPROST 50 UG/ML
1 SOLUTION/ DROPS OPHTHALMIC NIGHTLY
COMMUNITY
Start: 2024-05-17

## 2024-08-05 RX ORDER — GABAPENTIN 100 MG/1
CAPSULE ORAL
Qty: 60 CAPSULE | Refills: 2 | Status: SHIPPED | OUTPATIENT
Start: 2024-08-05 | End: 2024-11-05

## 2024-08-05 RX ORDER — FUROSEMIDE 20 MG/1
20 TABLET ORAL DAILY
COMMUNITY
Start: 2024-05-30

## 2024-08-05 RX ORDER — FAMOTIDINE 40 MG/1
40 TABLET, FILM COATED ORAL DAILY PRN
COMMUNITY
Start: 2024-07-09

## 2024-08-05 NOTE — PROGRESS NOTES
Holley Rolle presents today for   Chief Complaint   Patient presents with    Back Pain     Upper and lower       Is someone accompanying this pt? no    Is the patient using any DME equipment during OV? no      Coordination of Care:  1. Have you been to the ER, urgent care clinic since your last visit? no  Hospitalized since your last visit? no    2. Have you seen or consulted any other health care providers outside of the [facility] since your last visit? Yes, ortho and podiatry Include any pap smears or colon screening. no

## 2025-03-25 ENCOUNTER — OFFICE VISIT (OUTPATIENT)
Age: 85
End: 2025-03-25
Payer: MEDICARE

## 2025-03-25 VITALS — BODY MASS INDEX: 36.32 KG/M2 | WEIGHT: 225 LBS

## 2025-03-25 DIAGNOSIS — S93.491A SPRAIN OF ANTERIOR TALOFIBULAR LIGAMENT OF RIGHT ANKLE, INITIAL ENCOUNTER: Primary | ICD-10-CM

## 2025-03-25 DIAGNOSIS — S96.911A SPRAIN AND STRAIN OF RIGHT ANKLE: ICD-10-CM

## 2025-03-25 DIAGNOSIS — M76.61 ACHILLES TENDINITIS OF RIGHT LOWER EXTREMITY: ICD-10-CM

## 2025-03-25 DIAGNOSIS — S93.401A SPRAIN AND STRAIN OF RIGHT ANKLE: ICD-10-CM

## 2025-03-25 PROCEDURE — 99214 OFFICE O/P EST MOD 30 MIN: CPT | Performed by: ORTHOPAEDIC SURGERY

## 2025-03-25 PROCEDURE — G8427 DOCREV CUR MEDS BY ELIG CLIN: HCPCS | Performed by: ORTHOPAEDIC SURGERY

## 2025-03-25 PROCEDURE — 1036F TOBACCO NON-USER: CPT | Performed by: ORTHOPAEDIC SURGERY

## 2025-03-25 PROCEDURE — 1123F ACP DISCUSS/DSCN MKR DOCD: CPT | Performed by: ORTHOPAEDIC SURGERY

## 2025-03-25 PROCEDURE — G8417 CALC BMI ABV UP PARAM F/U: HCPCS | Performed by: ORTHOPAEDIC SURGERY

## 2025-03-25 PROCEDURE — 1090F PRES/ABSN URINE INCON ASSESS: CPT | Performed by: ORTHOPAEDIC SURGERY

## 2025-03-25 RX ORDER — ACETAMINOPHEN 500 MG
500 TABLET ORAL 2 TIMES DAILY PRN
Qty: 60 TABLET | Refills: 0 | Status: SHIPPED | OUTPATIENT
Start: 2025-03-25

## 2025-03-25 NOTE — PROGRESS NOTES
The patient presents today for evaluation of the right ankle. She reports losing consciousness and falling, injuring her right ankle about two weeks ago. She was seen at ED in [facility] after the injury, where she underwent Xrays of the right ankle and was given a short cam-walker for support. She seen by her PMD one week ago, who ordered Xrays of the right foot and advised the patient to discontinue the use of the cam-walker. She is currently using an ankle brace and ambulating with a rolling walker for support. She complains of an aching pain in the right ankle with walking and standing. She takes Tylenol QHS for symptom relief. Pain is a 7 out of 10.   
informative information regarding history and/or physical.  The historian is listed in the history, and information provided also.  Category 2 :  [x] Independent interpretation of tests performed by another physician, other qualified healthcare progression (not separately reported)   Category 3: Discussion and management of the or test interpretation: [] Discussed case with external physician, qualified health care professional about management or test interpretation not separately reported)     Risk: Moderate Risk of morbidity from additional diagnostic testing and treatment:   1     [x] Prescription medication management (can be prescribing new medication, continuing same dose of current medication, or changing dose of current medication),    [] Decision regarding minor surgery with identified patient or procedure risk factors :   [] Decision regarding elective major surgery without identified patient or procedure risk factors  [] Diagnosis or treatment significantly limited by social determinants of health diagnosis or treatment significantly limited by social determinants of health: Smoker, alcohol consumption, noncompliance, incarceration, limited access to care, medical literacy, environmental conditions, quality of housing, Food insecurity, Housing instability, Homelessness, transportation issues, Financial insecurity, Material hardship, Employment status hardship, Education,  status, Stress, Social connection, Health insurance coverage status issues, Health literacy, Elder abuse intimate partner violence, Medical cost burden.            Disclaimer:     Sections of this note are dictated using utilizing voice recognition software, which may have resulted in some phonetic based errors in grammar and contents. Even though attempts were made to correct all the mistakes, some may have been missed, and remained in the body of the document. If questions arise, please contact our department.     An electronic

## 2025-03-28 ENCOUNTER — HOSPITAL ENCOUNTER (OUTPATIENT)
Facility: HOSPITAL | Age: 85
Discharge: HOME | End: 2025-03-31
Attending: ORTHOPAEDIC SURGERY
Payer: MEDICARE

## 2025-03-28 DIAGNOSIS — S96.911A SPRAIN AND STRAIN OF RIGHT ANKLE: ICD-10-CM

## 2025-03-28 DIAGNOSIS — S93.401A SPRAIN AND STRAIN OF RIGHT ANKLE: ICD-10-CM

## 2025-03-28 PROCEDURE — 73700 CT LOWER EXTREMITY W/O DYE: CPT

## 2025-03-31 ENCOUNTER — OFFICE VISIT (OUTPATIENT)
Age: 85
End: 2025-03-31
Payer: MEDICARE

## 2025-03-31 ENCOUNTER — TELEPHONE (OUTPATIENT)
Age: 85
End: 2025-03-31

## 2025-03-31 DIAGNOSIS — S82.874A CLOSED NONDISPLACED PILON FRACTURE OF RIGHT TIBIA, INITIAL ENCOUNTER: Primary | ICD-10-CM

## 2025-03-31 PROCEDURE — G8417 CALC BMI ABV UP PARAM F/U: HCPCS | Performed by: ORTHOPAEDIC SURGERY

## 2025-03-31 PROCEDURE — 1090F PRES/ABSN URINE INCON ASSESS: CPT | Performed by: ORTHOPAEDIC SURGERY

## 2025-03-31 PROCEDURE — 1123F ACP DISCUSS/DSCN MKR DOCD: CPT | Performed by: ORTHOPAEDIC SURGERY

## 2025-03-31 PROCEDURE — 99213 OFFICE O/P EST LOW 20 MIN: CPT | Performed by: ORTHOPAEDIC SURGERY

## 2025-03-31 PROCEDURE — 27824 TREAT LOWER LEG FRACTURE: CPT | Performed by: ORTHOPAEDIC SURGERY

## 2025-03-31 PROCEDURE — G8427 DOCREV CUR MEDS BY ELIG CLIN: HCPCS | Performed by: ORTHOPAEDIC SURGERY

## 2025-03-31 PROCEDURE — 1036F TOBACCO NON-USER: CPT | Performed by: ORTHOPAEDIC SURGERY

## 2025-03-31 NOTE — PROGRESS NOTES
\"CO2\", \"BUN\", \"CREATININE\", \"GLUCOSE\", \"CALCIUM\", \"LABALBU\", \"BILITOT\", \"ALKPHOS\", \"AST\", \"ALT\", \"LABGLOM\", \"GFRAA\", \"AGRATIO\", \"GLOB\"  No results found for: \"VITD25\" ,No results found for: \"INR\", \"PROTIME\", No results found for: \"APTT\"       REVIEW OF SYSTEMS : 3/31/2025  ALL BELOW ARE Negative except : SEE HPI     All other systems reviewed and are negative.     14 point review of systems otherwise negative unless noted in HPI.          I have spent 20 total time minutes reviewing the previous notes, independently interpreting results reviewing diagnostic studies [Advanced  Imaging, Diagnostic test results (x-rays)] and had a direct face to face with the patient discussing the diagnosis and importance of compliance with the treatment and plan.  The treatment plan is listed in the above plan section of this Office encounter. I answered all of her questions, as well as documenting patient care coordination for this individual on the day of the visit.      Disclaimer:     Sections of this note are dictated using utilizing voice recognition software, which may have resulted in some phonetic based errors in grammar and contents. Even though attempts were made to correct all the mistakes, some may have been missed, and remained in the body of the document. If questions arise, please contact our department.     An electronic signature was used to authenticate this note.    Holley Rolle may have a reminder for a \"due or due soon\" health maintenance. I have asked that she contact her primary care provider for follow-up on this health maintenance.         Tj Bhakta MD  3/31/2025  1:21 PM

## 2025-04-01 NOTE — TELEPHONE ENCOUNTER
I called and spoke with the patient. I let her know that as per Dr. Bhakta last note, she should WBAT with [place] Brace and rolling walker. She reported some increased pain after using the [place] Brace, I recommended that she RICE. The patient verbalized understanding.

## 2025-04-21 ENCOUNTER — OFFICE VISIT (OUTPATIENT)
Age: 85
End: 2025-04-21
Payer: MEDICARE

## 2025-04-21 DIAGNOSIS — S82.874A CLOSED NONDISPLACED PILON FRACTURE OF RIGHT TIBIA, INITIAL ENCOUNTER: Primary | ICD-10-CM

## 2025-04-21 DIAGNOSIS — S96.911A SPRAIN AND STRAIN OF RIGHT ANKLE: ICD-10-CM

## 2025-04-21 DIAGNOSIS — S93.401A SPRAIN AND STRAIN OF RIGHT ANKLE: ICD-10-CM

## 2025-04-21 PROCEDURE — 99024 POSTOP FOLLOW-UP VISIT: CPT | Performed by: ORTHOPAEDIC SURGERY

## 2025-04-21 PROCEDURE — 73610 X-RAY EXAM OF ANKLE: CPT | Performed by: ORTHOPAEDIC SURGERY

## 2025-04-21 NOTE — PROGRESS NOTES
AMBULATORY PROGRESS NOTE      Patient: Holley Rolle             MRN: [id number]     SSN: [id number] There is no height or weight on file to calculate BMI.  YOB: 1940     AGE: 84 y.o.       EX: female    PCP: Aguila Adame DO       IMPRESSION //  DIAGNOSIS AND TREATMENT PLAN      Holley Rolle has a diagnosis of:      DIAGNOSES    1. Closed nondisplaced pilon fracture of right tibia, initial encounter    2. Sprain and strain of right ankle          PLAN:    1. Increase activity level as tolerated, using rollator walker and carbon fiber ARIZONA brace  2. Anticipate right ankle x-rays next visit    RTO 5 weeks    Orders Placed This Encounter    AMB POC XRAY, ANKLE; COMPLETE, 3+ VIEW        There are no Patient Instructions on file for this visit.      Please follow up with your PCP for any health maintenance as recommended.         Holley Rolle  expresses understanding of the diagnosis, treatment plan, and all of their proposed questions were answered to their satisfaction. Patient education has been provided re the diagnoses.         HPI //  OBJECTIVE EXAMINATION      Holley Rolle IS A 84 y.o. female who is a/an  established patient, presenting to my outpatient office for evaluation of  the following chief complaint(s):     Chief Complaint   Patient presents with    Ankle Pain     Right ankle       Patient presents for a right ankle follow up, injury occurred approximately 3/12/2025 after LOC and fall. Her pain level while walking has improved compared to before. She has occasional sharp pain to the lateral ankle. She does not walk much at this time. She still uses the carbon fiber ARIZONA brace, complains the straps are uncomfortable sometimes.       Patient is employed at: [employer]      There were no vitals taken for this visit.    Appearance: Alert, well appearing and pleasant patient who is in no distress, oriented to person, place/time, and who follows commands. Normal

## 2025-04-25 ENCOUNTER — TELEPHONE (OUTPATIENT)
Age: 85
End: 2025-04-25

## 2025-04-25 NOTE — TELEPHONE ENCOUNTER
Patient called to ask if it is okay for her to use compression stockings right now. She states her legs are starting to swell as it gets warmer and she likes to use compression socks to keep the swelling down but is unsure if it is safe to use right now with her fractures.    Please review and advise patient, [phone]

## 2025-05-01 ENCOUNTER — TELEPHONE (OUTPATIENT)
Age: 85
End: 2025-05-01

## 2025-05-01 NOTE — TELEPHONE ENCOUNTER
Patient called stating the brace is making her leg and the outside of her ankle hurt. She is asking what her options are in order to relieve some of the pain and if it is expected for it to hurt.    Please review and advise patient, [phone]

## 2025-05-12 ENCOUNTER — OFFICE VISIT (OUTPATIENT)
Age: 85
End: 2025-05-12
Payer: MEDICARE

## 2025-05-12 DIAGNOSIS — S82.874A CLOSED NONDISPLACED PILON FRACTURE OF RIGHT TIBIA, INITIAL ENCOUNTER: Primary | ICD-10-CM

## 2025-05-12 DIAGNOSIS — S96.911A SPRAIN AND STRAIN OF RIGHT ANKLE: ICD-10-CM

## 2025-05-12 DIAGNOSIS — M76.61 ACHILLES TENDINITIS OF RIGHT LOWER EXTREMITY: ICD-10-CM

## 2025-05-12 DIAGNOSIS — S93.491A SPRAIN OF ANTERIOR TALOFIBULAR LIGAMENT OF RIGHT ANKLE, INITIAL ENCOUNTER: ICD-10-CM

## 2025-05-12 DIAGNOSIS — S93.401A SPRAIN AND STRAIN OF RIGHT ANKLE: ICD-10-CM

## 2025-05-12 PROCEDURE — 73610 X-RAY EXAM OF ANKLE: CPT | Performed by: ORTHOPAEDIC SURGERY

## 2025-05-12 PROCEDURE — 99024 POSTOP FOLLOW-UP VISIT: CPT | Performed by: ORTHOPAEDIC SURGERY

## 2025-05-12 NOTE — PROGRESS NOTES
AMBULATORY PROGRESS NOTE      Patient: Holley Rolle             MRN: [id number]     SSN: [id number] There is no height or weight on file to calculate BMI.  YOB: 1940     AGE: 84 y.o.       EX: female    PCP: Aguila Adame DO       IMPRESSION //  DIAGNOSIS AND TREATMENT PLAN      Holley Rolle has a diagnosis of:      DIAGNOSES    1. Closed nondisplaced pilon fracture of right tibia, initial encounter    2. Sprain and strain of right ankle    3. Sprain of anterior talofibular ligament of right ankle, initial encounter    4. Achilles tendinitis of right lower extremity          PLAN:    1. Continue ambulation in right ARIZONA brace, using walker      RTO 4 weeks     Orders Placed This Encounter    AMB POC XRAY, ANKLE; COMPLETE, 3+ VIEW        There are no Patient Instructions on file for this visit.      Please follow up with your PCP for any health maintenance as recommended.         Holley Rolle  expresses understanding of the diagnosis, treatment plan, and all of their proposed questions were answered to their satisfaction. Patient education has been provided re the diagnoses.         HPI //  OBJECTIVE EXAMINATION      Holley Rolle IS A 84 y.o. female who is a/an  established patient, presenting to my outpatient office for evaluation of  the following chief complaint(s):     Chief Complaint   Patient presents with    Follow-up     Right ankle       Patient presents for a right ankle follow up, injury occurred approximately 3/12/2025 after LOC and fall. She still complains of soreness to the lateral ankle. She has been wearing the ARIZONA brace since prior to the injury in March.       Patient is employed at: [employer]     There were no vitals taken for this visit.    Appearance: Alert, well appearing and pleasant patient who is in no distress, oriented to person, place/time, and who follows commands. Normal dress/motor activity/thought processes/memory. This patient presents in the

## 2025-06-09 ENCOUNTER — OFFICE VISIT (OUTPATIENT)
Age: 85
End: 2025-06-09
Payer: MEDICARE

## 2025-06-09 DIAGNOSIS — S93.401A SPRAIN AND STRAIN OF RIGHT ANKLE: ICD-10-CM

## 2025-06-09 DIAGNOSIS — S82.874A CLOSED NONDISPLACED PILON FRACTURE OF RIGHT TIBIA, INITIAL ENCOUNTER: Primary | ICD-10-CM

## 2025-06-09 DIAGNOSIS — S93.491A SPRAIN OF ANTERIOR TALOFIBULAR LIGAMENT OF RIGHT ANKLE, INITIAL ENCOUNTER: ICD-10-CM

## 2025-06-09 DIAGNOSIS — M76.61 ACHILLES TENDINITIS OF RIGHT LOWER EXTREMITY: ICD-10-CM

## 2025-06-09 DIAGNOSIS — S96.911A SPRAIN AND STRAIN OF RIGHT ANKLE: ICD-10-CM

## 2025-06-09 PROCEDURE — G8417 CALC BMI ABV UP PARAM F/U: HCPCS | Performed by: ORTHOPAEDIC SURGERY

## 2025-06-09 PROCEDURE — 99213 OFFICE O/P EST LOW 20 MIN: CPT | Performed by: ORTHOPAEDIC SURGERY

## 2025-06-09 PROCEDURE — 1036F TOBACCO NON-USER: CPT | Performed by: ORTHOPAEDIC SURGERY

## 2025-06-09 PROCEDURE — 1123F ACP DISCUSS/DSCN MKR DOCD: CPT | Performed by: ORTHOPAEDIC SURGERY

## 2025-06-09 PROCEDURE — 73610 X-RAY EXAM OF ANKLE: CPT | Performed by: ORTHOPAEDIC SURGERY

## 2025-06-09 PROCEDURE — G8427 DOCREV CUR MEDS BY ELIG CLIN: HCPCS | Performed by: ORTHOPAEDIC SURGERY

## 2025-06-09 PROCEDURE — 1090F PRES/ABSN URINE INCON ASSESS: CPT | Performed by: ORTHOPAEDIC SURGERY

## 2025-06-09 NOTE — PROGRESS NOTES
discomfort sometimes. She would like to return to walking for exercise in her neighborhood.     Patient is employed at: Retired      There were no vitals taken for this visit.    Appearance: Alert, well appearing and pleasant patient who is in no distress, oriented to person, place/time, and who follows commands. Normal dress/motor activity/thought processes/memory. This patient presents in the examination room by herself. Patient arrives to office via: with assistive device: rolling walker. Footwear: right Velcro [place] brace with diabetic shoes      Psychiatric:  Normal Affect/mood.  Judgement, behavior, and conduct are appropriate. Speech normal in context and clarity, memory intact grossly, no involuntary movements - tremors.   H EENT (2): Head normocephalic & atraumatic.  Eye: pupils are round// EOM are intact // Neck: ROM WNL  // Hearings Intact  Respiratory: Breathing non labored     RIGHT ANKLE/FOOT    Gait:  uses assistive device   Tenderness: Nontender  Cutaneous: WNL.  Joint Motion: functional ankle and hindfoot motion, good strength   Joint / Tendon Stability:  No Ankle or Subtalar instability or joint laxity.                       No peroneal sublux ability or dislocation  Alignment: significant pronation of the right hindfoot   Neuro Motor/Sensory: NL/NL  Vascular: 1+ DP/1+ PT pulses  Lymphatics: No extremity lymphedema, No calf swelling, no tenderness to calf muscles.    RADIOGRAPHS// IMAGING//DIAGNOSTIC DATA     Orders Placed This Encounter   Procedures    AMB POC XRAY, ANKLE; COMPLETE, 3+ VIEW    DME Order for (Specify) as OP     - DME device ordered - Please add additional padding to current [place] Brace for comfort.            RIGHT ANKLE X-rays, NWB 3+ views, AP/LAT/OBL completed 6/9/2025 AT [facility]    X-rays reveal Osseous:    no acute fracture//there are no dislocation or subluxation noted. Overall alignment is  acceptable. Soft tissue swelling is mild noted. No osteolytic or

## 2025-08-11 ENCOUNTER — OFFICE VISIT (OUTPATIENT)
Age: 85
End: 2025-08-11

## 2025-08-11 DIAGNOSIS — M76.61 ACHILLES TENDINITIS OF RIGHT LOWER EXTREMITY: ICD-10-CM

## 2025-08-11 DIAGNOSIS — S93.491A SPRAIN OF ANTERIOR TALOFIBULAR LIGAMENT OF RIGHT ANKLE, INITIAL ENCOUNTER: ICD-10-CM

## 2025-08-11 DIAGNOSIS — M25.572 ACUTE LEFT ANKLE PAIN: ICD-10-CM

## 2025-08-11 DIAGNOSIS — S82.874A CLOSED NONDISPLACED PILON FRACTURE OF RIGHT TIBIA, INITIAL ENCOUNTER: Primary | ICD-10-CM

## 2025-08-11 DIAGNOSIS — S93.401A SPRAIN AND STRAIN OF RIGHT ANKLE: ICD-10-CM

## 2025-08-11 DIAGNOSIS — S96.911A SPRAIN AND STRAIN OF RIGHT ANKLE: ICD-10-CM
